# Patient Record
Sex: MALE | Race: BLACK OR AFRICAN AMERICAN | Employment: FULL TIME | URBAN - METROPOLITAN AREA
[De-identification: names, ages, dates, MRNs, and addresses within clinical notes are randomized per-mention and may not be internally consistent; named-entity substitution may affect disease eponyms.]

---

## 2017-08-21 PROBLEM — C83.10 MANTLE CELL LYMPHOMA (HCC): Status: ACTIVE | Noted: 2017-08-21

## 2017-08-21 PROBLEM — C61 PROSTATE CANCER (HCC): Status: ACTIVE | Noted: 2017-08-21

## 2017-08-21 PROBLEM — K63.5 COLON POLYP: Status: ACTIVE | Noted: 2017-08-21

## 2017-08-21 PROBLEM — N40.0 BENIGN NON-NODULAR PROSTATIC HYPERPLASIA WITHOUT LOWER URINARY TRACT SYMPTOMS: Status: ACTIVE | Noted: 2017-08-21

## 2017-08-21 PROBLEM — L03.221 CELLULITIS OF NECK: Status: ACTIVE | Noted: 2017-08-21

## 2017-08-21 PROBLEM — N52.9 ED (ERECTILE DYSFUNCTION) OF ORGANIC ORIGIN: Status: ACTIVE | Noted: 2017-08-21

## 2017-08-21 PROBLEM — E78.5 HYPERLIPIDEMIA: Status: ACTIVE | Noted: 2017-08-21

## 2017-08-21 PROBLEM — G47.419 NARCOLEPSY: Status: ACTIVE | Noted: 2017-08-21

## 2017-08-21 PROBLEM — E87.6 HYPOKALEMIA: Status: ACTIVE | Noted: 2017-08-21

## 2017-08-21 PROBLEM — J31.0 CHRONIC RHINITIS: Status: ACTIVE | Noted: 2017-08-21

## 2017-08-21 RX ORDER — SILDENAFIL CITRATE 20 MG/1
20 TABLET ORAL AS NEEDED
COMMUNITY
End: 2018-04-10

## 2017-09-20 ENCOUNTER — OFFICE VISIT (OUTPATIENT)
Dept: INTERNAL MEDICINE CLINIC | Age: 62
End: 2017-09-20

## 2017-09-20 VITALS
BODY MASS INDEX: 25.93 KG/M2 | WEIGHT: 165.2 LBS | DIASTOLIC BLOOD PRESSURE: 80 MMHG | SYSTOLIC BLOOD PRESSURE: 136 MMHG | HEIGHT: 67 IN | HEART RATE: 90 BPM

## 2017-09-20 DIAGNOSIS — E78.00 PURE HYPERCHOLESTEROLEMIA: Chronic | ICD-10-CM

## 2017-09-20 DIAGNOSIS — I10 ESSENTIAL HYPERTENSION: Primary | Chronic | ICD-10-CM

## 2017-09-20 DIAGNOSIS — N52.9 ED (ERECTILE DYSFUNCTION) OF ORGANIC ORIGIN: ICD-10-CM

## 2017-09-20 PROBLEM — E78.5 HYPERLIPIDEMIA: Chronic | Status: ACTIVE | Noted: 2017-08-21

## 2017-09-20 NOTE — PROGRESS NOTES
Sherry Olsen is a 57 y.o. male presenting for Hypertension (6 mo fu)  . 1. Have you been to the ER, urgent care clinic since your last visit? Hospitalized since your last visit? No    2. Have you seen or consulted any other health care providers outside of the [address] [place] [address] since your last visit? Include any pap smears or colon screening. Yes- Dr [employer]- prostate check. No flowsheet data found. No flowsheet data found. PHQ over the last two weeks 9/20/2017   Little interest or pleasure in doing things Not at all   Feeling down, depressed or hopeless Not at all   Total Score PHQ 2 0       There are no discontinued medications.

## 2017-09-20 NOTE — MR AVS SNAPSHOT
Visit Information Date & Time Provider Department Dept. Phone Encounter #  
 9/20/2017  8:30 AM Michael [facility] [phone] [id number] Follow-up Instructions Return in about 6 months (around 3/20/2018). Upcoming Health Maintenance Date Due Hepatitis C Screening 1955 Pneumococcal 19-64 Highest Risk (1 of 3 - PCV13) 2/19/1972 DTaP/Tdap/Td series (1 - Tdap) 2/19/1974 FOBT Q 1 YEAR AGE 50-75 2/19/2003 ZOSTER VACCINE AGE 60> 12/19/2012 INFLUENZA AGE 9 TO ADULT 8/1/2017 Allergies as of 9/20/2017  Review Complete On: 9/20/2017 By: Michael Gomez MD  
  
 Severity Noted Reaction Type Reactions Lisinopril Low 09/22/2010    Cough Current Immunizations  Never Reviewed Name Date Influenza Vaccine Split 9/5/2012 11:42 AM  
  
 Not reviewed this visit You Were Diagnosed With   
  
 Codes Comments Essential hypertension    -  Primary ICD-10-CM: I10 
ICD-9-CM: 401.9 Pure hypercholesterolemia     ICD-10-CM: E78.00 ICD-9-CM: 272.0 ED (erectile dysfunction) of organic origin     ICD-10-CM: N52.9 ICD-9-CM: 607.84 Vitals BP Pulse Height(growth percentile) Weight(growth percentile) BMI Smoking Status 136/80 (BP 1 Location: Right arm, BP Patient Position: Sitting) 90 5' 7\" (1.702 m) 165 lb 3.2 oz (74.9 kg) 25.87 kg/m2 Never Smoker BMI and BSA Data Body Mass Index Body Surface Area  
 25.87 kg/m 2 1.88 m 2 Preferred Pharmacy Pharmacy Name Phone [facility] [address], [address] [place] [address] Avenue [phone] Your Updated Medication List  
  
   
This list is accurate as of: 9/20/17  8:55 AM.  Always use your most recent med list. amLODIPine 5 mg tablet Commonly known as:  Mary Ellen Winn Take 1 Tab by mouth daily. potassium chloride 20 mEq tablet Commonly known as:  KLOR-CON M20  
 Take 1 Tab by mouth three (3) times daily. propranolol 40 mg tablet Commonly known as:  INDERAL Take 40 mg by mouth two (2) times a day. sildenafil 20 mg tablet Commonly known as:  REVATIO Take 20 mg by mouth three (3) times daily. Follow-up Instructions Return in about 6 months (around 3/20/2018). Patient Instructions DASH Diet: Care Instructions Your Care Instructions The DASH diet is an eating plan that can help lower your blood pressure. DASH stands for Dietary Approaches to Stop Hypertension. Hypertension is high blood pressure. The DASH diet focuses on eating foods that are high in calcium, potassium, and magnesium. These nutrients can lower blood pressure. The foods that are highest in these nutrients are fruits, vegetables, low-fat dairy products, nuts, seeds, and legumes. But taking calcium, potassium, and magnesium supplements instead of eating foods that are high in those nutrients does not have the same effect. The DASH diet also includes whole grains, fish, and poultry. The DASH diet is one of several lifestyle changes your doctor may recommend to lower your high blood pressure. Your doctor may also want you to decrease the amount of sodium in your diet. Lowering sodium while following the DASH diet can lower blood pressure even further than just the DASH diet alone. Follow-up care is a key part of your treatment and safety. Be sure to make and go to all appointments, and call your doctor if you are having problems. It's also a good idea to know your test results and keep a list of the medicines you take. How can you care for yourself at home? Following the DASH diet · Eat 4 to 5 servings of fruit each day. A serving is 1 medium-sized piece of fruit, ½ cup chopped or canned fruit, 1/4 cup dried fruit, or 4 ounces (½ cup) of fruit juice. Choose fruit more often than fruit juice. · Eat 4 to 5 servings of vegetables each day. A serving is 1 cup of lettuce or raw leafy vegetables, ½ cup of chopped or cooked vegetables, or 4 ounces (½ cup) of vegetable juice. Choose vegetables more often than vegetable juice. · Get 2 to 3 servings of low-fat and fat-free dairy each day. A serving is 8 ounces of milk, 1 cup of yogurt, or 1 ½ ounces of cheese. · Eat 6 to 8 servings of grains each day. A serving is 1 slice of bread, 1 ounce of dry cereal, or ½ cup of cooked rice, pasta, or cooked cereal. Try to choose whole-grain products as much as possible. · Limit lean meat, poultry, and fish to 2 servings each day. A serving is 3 ounces, about the size of a deck of cards. · Eat 4 to 5 servings of nuts, seeds, and legumes (cooked dried beans, lentils, and split peas) each week. A serving is 1/3 cup of nuts, 2 tablespoons of seeds, or ½ cup of cooked beans or peas. · Limit fats and oils to 2 to 3 servings each day. A serving is 1 teaspoon of vegetable oil or 2 tablespoons of salad dressing. · Limit sweets and added sugars to 5 servings or less a week. A serving is 1 tablespoon jelly or jam, ½ cup sorbet, or 1 cup of lemonade. · Eat less than 2,300 milligrams (mg) of sodium a day. If you limit your sodium to 1,500 mg a day, you can lower your blood pressure even more. Tips for success · Start small. Do not try to make dramatic changes to your diet all at once. You might feel that you are missing out on your favorite foods and then be more likely to not follow the plan. Make small changes, and stick with them. Once those changes become habit, add a few more changes. · Try some of the following: ¨ Make it a goal to eat a fruit or vegetable at every meal and at snacks. This will make it easy to get the recommended amount of fruits and vegetables each day. ¨ Try yogurt topped with fruit and nuts for a snack or healthy dessert. ¨ Add lettuce, tomato, cucumber, and onion to sandwiches. ¨ Combine a ready-made pizza crust with low-fat mozzarella cheese and lots of vegetable toppings. Try using tomatoes, squash, spinach, broccoli, carrots, cauliflower, and onions. ¨ Have a variety of cut-up vegetables with a low-fat dip as an appetizer instead of chips and dip. ¨ Sprinkle sunflower seeds or chopped almonds over salads. Or try adding chopped walnuts or almonds to cooked vegetables. ¨ Try some vegetarian meals using beans and peas. Add garbanzo or kidney beans to salads. Make burritos and tacos with mashed cruz beans or black beans. Where can you learn more? Go to http://fredrick-ankita.info/. Enter Y755 in the search box to learn more about \"DASH Diet: Care Instructions. \" Current as of: April 3, 2017 Content Version: 11.3 © 9646-9409 [employer]. Care instructions adapted under license by [employer] (which disclaims liability or warranty for this information). If you have questions about a medical condition or this instruction, always ask your healthcare professional. [place] [address] any warranty or liability for your use of this information. Introducing [facility]! [facility] introduces Quietly patient portal. Now you can access parts of your medical record, email your doctor's office, and request medication refills online. 1. In your internet browser, go to [email or web address] 2. Click on the First Time User? Click Here link in the Sign In box. You will see the New Member Sign Up page. 3. Enter your Quietly Access Code exactly as it appears below. You will not need to use this code after youve completed the sign-up process. If you do not sign up before the expiration date, you must request a new code. · Quietly Access Code: [email or web address] Expires: 12/19/2017  8:34 AM 
 
4.  Enter the last four digits of your Social Security Number (xxxx) and Date of Birth (mm/dd/yyyy) as indicated and click Submit. You will be taken to the next sign-up page. 5. Create a NBO TV ID. This will be your NBO TV login ID and cannot be changed, so think of one that is secure and easy to remember. 6. Create a NBO TV password. You can change your password at any time. 7. Enter your Password Reset Question and Answer. This can be used at a later time if you forget your password. 8. Enter your e-mail address. You will receive e-mail notification when new information is available in [address] 9. Click Sign Up. You can now view and download portions of your medical record. 10. Click the Download Summary menu link to download a portable copy of your medical information. If you have questions, please visit the Frequently Asked Questions section of the NBO TV website. Remember, NBO TV is NOT to be used for urgent needs. For medical emergencies, dial 911. Now available from your iPhone and Android! Please provide this summary of care documentation to your next provider. Your primary care clinician is listed as WILTON Head. If you have any questions after today's visit, please call [phone].

## 2017-09-20 NOTE — PROGRESS NOTES
This note will not be viewable in [address] Subjective:     Mr. Mary Peterson presents to the office today in follow-up of his hypertension and hyperlipidemia. The patient has hypertension for which she is on 10 mg of amlodipine and 40 mg twice a day of propranolol. He is tolerating this well. The patient was seen at the [address] [place] [address] on August 14 with a blood pressure recording of 126/75. Patient denies dizziness or lower extremity edema, fatigue or palpitations, he has had no headaches numbness or tingling    He has a dyslipidemia but currently is on no medication for this. He follows a prudent diet and remains physically active. He has no history of ASCVD and denies exertional chest pains or claudication. Patient is followed at the [address] [place] [address] for mantle cell lymphoma, prostate cancer. He did have laboratory studies done at that time which he brings to the office today.     Past Medical History:   Diagnosis Date    Benign non-nodular prostatic hyperplasia without lower urinary tract symptoms 8/21/2017    Cancer [facility])     prostate - surgery/lymphoma    Cellulitis of neck 8/21/2017    Chronic rhinitis 8/21/2017    Colon polyp 8/21/2017    ED (erectile dysfunction) of organic origin 8/21/2017    GERD (gastroesophageal reflux disease)     HNP (herniated nucleus pulposus) 8/21/2017    HTN (hypertension) 9/1/2012    Hyperlipidemia 8/21/2017    Hypertension     Hypokalemia 8/21/2017    Mantle cell lymphoma ([facility] [address] 8/21/2017    Narcolepsy 8/21/2017    Other ill-defined conditions     hand laceration sutured    Prostate cancer ([facility] [address] 8/21/2017     Past Surgical History:   Procedure Laterality Date    HX OTHER SURGICAL      surgery for lymphoma - abdomen    HX PROSTATECTOMY       Allergies   Allergen Reactions    Lisinopril Cough     Current Outpatient Prescriptions   Medication Sig Dispense Refill    sildenafil (REVATIO) 20 mg tablet Take 20 mg by mouth three (3) times daily.      potassium chloride (KLOR-CON M20) 20 mEq tablet Take 1 Tab by mouth three (3) times daily. 90 Tab 0    amLODIPine (NORVASC) 5 mg tablet Take 1 Tab by mouth daily. (Patient taking differently: Take 10 mg by mouth daily.) 30 Tab 0    propranolol (INDERAL) 40 mg tablet Take 40 mg by mouth two (2) times a day. 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    Smoking status: Never Smoker    Smokeless tobacco: Never Used    Alcohol use No    Drug use: No    Sexual activity: Yes     Partners: Female     Other Topics Concern    None     Social History Narrative     Family History   Problem Relation Age of Onset    Cancer Maternal Uncle      prostate    Stroke Maternal Uncle     Stroke Maternal Grandmother        Review of Systems:  GEN: no weight loss, weight gain, fatigue or night sweats  CV: no PND, orthopnea, or palpitations  Resp: no dyspnea on exertion, no cough  Abd: no nausea, vomiting or diarrhea  EXT: denies edema, claudication  Endocrine: no hair loss, excessive thirst or polyuria  Neurological ROS: no TIA or stroke symptoms  ROS otherwise negative      Objective:     Visit Vitals    /80 (BP 1 Location: Right arm, BP Patient Position: Sitting)    Pulse 90    Ht 5' 7\" (1.702 m)    Wt 165 lb 3.2 oz (74.9 kg)    BMI 25.87 kg/m2     Body mass index is 25.87 kg/(m^2). General:   alert, cooperative and no distress   Eyes: conjunctivae/sclerae clear. PERRL, EOM's intact   Mouth:  No oral lesions, no pharyngeal erythema, no exudates   Neck: Trachea midline, no thyromegaly, no bruits   Heart: S1 and S2 normal,no murmurs noted    Lungs: Clear to auscultation bilaterally, no increased work of breathing   Abdomen: Soft, nontender.   Normal bowel sounds   Extremities: No edema or cyanosis   Neuro: ..alert, oriented x3,speech normal in context and clarity, cranial nerves II-XII intact,motor strength: full proximally and distally,gait: normal  reflexes: full and symmetric     Physical exam otherwise negative         Assessment/Plan:     Diagnoses and all orders for this visit:    Essential hypertension    Pure hypercholesterolemia    ED (erectile dysfunction) of organic origin        Other instructions:   Blood pressure is adequately controlled on his current medical regimen and no change in medication is made. The patient had been prescribed sildenafil for his erectile dysfunction but was not tolerant of it. Labs from the [facility] done in August have been reviewed with the patient. Follow-up 6 months    Follow-up Disposition:  Return in about 6 months (around 3/20/2018).     Radha Kinney MD

## 2017-10-09 ENCOUNTER — OFFICE VISIT (OUTPATIENT)
Dept: INTERNAL MEDICINE CLINIC | Age: 62
End: 2017-10-09

## 2017-10-09 VITALS
HEIGHT: 67 IN | BODY MASS INDEX: 25.74 KG/M2 | DIASTOLIC BLOOD PRESSURE: 84 MMHG | HEART RATE: 84 BPM | SYSTOLIC BLOOD PRESSURE: 120 MMHG | WEIGHT: 164 LBS

## 2017-10-09 DIAGNOSIS — I10 ESSENTIAL HYPERTENSION: Primary | Chronic | ICD-10-CM

## 2017-10-09 LAB
ALBUMIN SERPL-MCNC: 4.1 G/DL (ref 3.9–5.4)
ALKALINE PHOS POC: 101 U/L (ref 38–126)
ALT SERPL-CCNC: 31 U/L (ref 9–52)
AST SERPL-CCNC: 25 U/L (ref 14–36)
BACTERIA UA POCT, BACTPOCT: ABNORMAL
BILIRUB UR QL STRIP: NEGATIVE
BUN BLD-MCNC: 14 MG/DL (ref 9–20)
CALCIUM BLD-MCNC: 9.4 MG/DL (ref 8.4–10.2)
CASTS UA POCT: ABNORMAL
CHLORIDE BLD-SCNC: 105 MMOL/L (ref 98–107)
CHOLEST SERPL-MCNC: 184 MG/DL (ref 0–200)
CLUE CELLS, CLUEPOCT: NEGATIVE
CO2 POC: 29 MMOL/L (ref 22–32)
CREAT BLD-MCNC: 1.1 MG/DL (ref 0.8–1.5)
CRYSTALS UA POCT, CRYSPOCT: NEGATIVE
EGFR (POC): 70.6
EPITHELIAL CELLS POCT: ABNORMAL
GLUCOSE POC: 95 MG/DL (ref 75–110)
GLUCOSE UR-MCNC: NEGATIVE MG/DL
GRAN# POC: 3.5 K/UL (ref 2–7.8)
GRAN% POC: 63.3 % (ref 37–92)
HCT VFR BLD CALC: 40.9 % (ref 37–51)
HDLC SERPL-MCNC: 45 MG/DL (ref 35–130)
HGB BLD-MCNC: 13.5 G/DL (ref 12–18)
KETONES P FAST UR STRIP-MCNC: NEGATIVE MG/DL
LDL CHOLESTEROL POC: 124.4 MG/DL (ref 0–130)
LY# POC: 1.7 K/UL (ref 0.6–4.1)
LY% POC: 31.3 % (ref 10–58.5)
MCH RBC QN: 28.4 PG (ref 26–32)
MCHC RBC-ENTMCNC: 33 G/DL (ref 30–36)
MCV RBC: 86 FL (ref 80–97)
MID #, POC: 0.2 K/UL (ref 0–1.8)
MID% POC: 5.4 % (ref 0.1–24)
MUCUS UA POCT, MUCPOCT: ABNORMAL
PH UR STRIP: 7 [PH] (ref 5–7)
PLATELET # BLD: 301 K/UL (ref 140–440)
POTASSIUM SERPL-SCNC: 4.6 MMOL/L (ref 3.6–5)
PROT SERPL-MCNC: 7.7 G/DL (ref 6.3–8.2)
PROT UR QL STRIP: NEGATIVE MG/DL
RBC # BLD: 4.75 M/UL (ref 4.2–6.3)
RBC UA POCT, RBCPOCT: ABNORMAL
SODIUM SERPL-SCNC: 145 MMOL/L (ref 137–145)
SP GR UR STRIP: 1 (ref 1.01–1.02)
TCHOL/HDL RATIO (POC): 4.1 (ref 0–4)
TOTAL BILIRUBIN POC: 0.5 MG/DL (ref 0.2–1.3)
TRICH UA POCT, TRICHPOC: NEGATIVE
TRIGL SERPL-MCNC: 73 MG/DL (ref 0–200)
UA UROBILINOGEN AMB POC: NORMAL (ref 0.2–1)
URINALYSIS CLARITY POC: CLEAR
URINALYSIS COLOR POC: ABNORMAL
URINE BLOOD POC: NEGATIVE
URINE CULT COMMENT, POCT: ABNORMAL
URINE LEUKOCYTES POC: NEGATIVE
URINE NITRITES POC: NEGATIVE
VLDLC SERPL CALC-MCNC: 14.6 MG/DL
WBC # BLD: 5.4 K/UL (ref 4.1–10.9)
WBC UA POCT, WBCPOCT: 0
YEAST UA POCT, YEASTPOC: NEGATIVE

## 2017-10-09 NOTE — PROGRESS NOTES
Anton Osborne is a 57 y.o. male presenting for Hypertension  . 1. Have you been to the ER, urgent care clinic since your last visit? Hospitalized since your last visit? No    2. Have you seen or consulted any other health care providers outside of the [address] [place] [address] since your last visit? Include any pap smears or colon screening. No    No flowsheet data found. Abuse Screening Questionnaire 10/9/2017   Do you ever feel afraid of your partner? N   Are you in a relationship with someone who physically or mentally threatens you? N   Is it safe for you to go home? Y       PHQ over the last two weeks 9/20/2017   Little interest or pleasure in doing things Not at all   Feeling down, depressed or hopeless Not at all   Total Score PHQ 2 0       There are no discontinued medications.

## 2017-10-09 NOTE — PATIENT INSTRUCTIONS

## 2017-10-09 NOTE — PROGRESS NOTES
This note will not be viewable in [address] Subjective:     Mr. Chantell Barber returns to the office today in follow-up of his hypertension. He remains on propranolol and amlodipine. He denies fatigue or palpitations, lower extremity edema or dizziness, headache, numbness, tingling or focal neurological problem. Past Medical History:   Diagnosis Date    Benign non-nodular prostatic hyperplasia without lower urinary tract symptoms 8/21/2017    Cancer [facility])     prostate - surgery/lymphoma    Cellulitis of neck 8/21/2017    Chronic rhinitis 8/21/2017    Colon polyp 8/21/2017    ED (erectile dysfunction) of organic origin 8/21/2017    GERD (gastroesophageal reflux disease)     HNP (herniated nucleus pulposus) 8/21/2017    HTN (hypertension) 9/1/2012    Hyperlipidemia 8/21/2017    Hypertension     Hypokalemia 8/21/2017    Mantle cell lymphoma ([facility] [address] 8/21/2017    Narcolepsy 8/21/2017    Other ill-defined conditions(799.89)     hand laceration sutured    Prostate cancer ([facility] [address] 8/21/2017     Past Surgical History:   Procedure Laterality Date    HX OTHER SURGICAL      surgery for lymphoma - abdomen    HX PROSTATECTOMY       Allergies   Allergen Reactions    Lisinopril Cough     Current Outpatient Prescriptions   Medication Sig Dispense Refill    sildenafil (REVATIO) 20 mg tablet Take 20 mg by mouth as needed.  potassium chloride (KLOR-CON M20) 20 mEq tablet Take 1 Tab by mouth three (3) times daily. 90 Tab 0    amLODIPine (NORVASC) 5 mg tablet Take 1 Tab by mouth daily. (Patient taking differently: Take 10 mg by mouth daily.) 30 Tab 0    propranolol (INDERAL) 40 mg tablet Take 40 mg by mouth two (2) times a day.        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    Smoking status: Never Smoker    Smokeless tobacco: Never Used  Alcohol use No    Drug use: No    Sexual activity: Yes     Partners: Female     Other Topics Concern    None     Social History Narrative     Family History   Problem Relation Age of Onset    Cancer Maternal Uncle      prostate    Stroke Maternal Uncle     Stroke Maternal Grandmother        Review of Systems:  GEN: no weight loss, weight gain, fatigue or night sweats  CV: no PND, orthopnea, or palpitations  Resp: no dyspnea on exertion, no cough  Abd: no nausea, vomiting or diarrhea  EXT: denies edema, claudication  Endocrine: no hair loss, excessive thirst or polyuria  Neurological ROS: no TIA or stroke symptoms  ROS otherwise negative      Objective:     Visit Vitals    /84    Pulse 84    Ht 5' 7\" (1.702 m)    Wt 164 lb (74.4 kg)    BMI 25.69 kg/m2     Body mass index is 25.69 kg/(m^2). General:   alert, cooperative and no distress   Eyes: conjunctivae/sclerae clear. PERRL, EOM's intact   Mouth:  No oral lesions, no pharyngeal erythema, no exudates   Neck: Trachea midline, no thyromegaly, no bruits   Heart: S1 and S2 normal,no murmurs noted    Lungs: Clear to auscultation bilaterally, no increased work of breathing   Abdomen: Soft, nontender. Normal bowel sounds   Extremities: No edema or cyanosis   Neuro: ..alert, oriented x3,speech normal in context and clarity, cranial nerves II-XII intact,motor strength: full proximally and distally,gait: normal  reflexes: full and symmetric     Physical exam otherwise negative         Assessment/Plan:     Diagnoses and all orders for this visit:    Essential hypertension  -     AMB POC COMPLETE CBC,AUTOMATED ENTER  -     AMB POC COMPREHENSIVE METABOLIC PANEL  -     AMB POC LIPID PROFILE  -     AMB POC URINALYSIS DIP STICK AUTO W/ MICRO   -     NM COLLECTION VENOUS BLOOD,VENIPUNCTURE        Other instructions:   His blood pressure is improved and adequately controlled.     No change in his current medical regimen is made    Medical form for him to take back to [place] is completed with copy to be included in the E HR    Await labs obtained today    Follow-up 6 months    Follow-up Disposition:  Return in about 6 months (around 4/9/2018).     Raghav Tripp MD

## 2017-10-09 NOTE — MR AVS SNAPSHOT
Visit Information Date & Time Provider Department Dept. Phone Encounter #  
 10/9/2017  3:30 PM Sneha Thapa [address] [phone] [id number] Follow-up Instructions Return in about 6 months (around 4/9/2018). Follow-up and Disposition History Your Appointments 3/21/2018  8:00 AM  
FOLLOW UP 10 with MD Sneha Thapa [address] ([facility]) Appt Note: 6 mo fu  
 [address] [id number] [address] [facility] [address] [address] Upcoming Health Maintenance Date Due Hepatitis C Screening 1955 Pneumococcal 19-64 Highest Risk (1 of 3 - PCV13) 2/19/1972 DTaP/Tdap/Td series (1 - Tdap) 2/19/1974 FOBT Q 1 YEAR AGE 50-75 2/19/2003 ZOSTER VACCINE AGE 60> 12/19/2012 INFLUENZA AGE 9 TO ADULT 8/1/2017 Allergies as of 10/9/2017  Review Complete On: 10/9/2017 By: Karen Rodriguez MD  
  
 Severity Noted Reaction Type Reactions Lisinopril Low 09/22/2010    Cough Current Immunizations  Never Reviewed Name Date Influenza Vaccine Split 9/5/2012 11:42 AM  
  
 Not reviewed this visit You Were Diagnosed With   
  
 Codes Comments Essential hypertension    -  Primary ICD-10-CM: I10 
ICD-9-CM: 401.9 Vitals BP Pulse Height(growth percentile) Weight(growth percentile) BMI Smoking Status 120/84 84 5' 7\" (1.702 m) 164 lb (74.4 kg) 25.69 kg/m2 Never Smoker Vitals History BMI and BSA Data Body Mass Index Body Surface Area  
 25.69 kg/m 2 1.88 m 2 Preferred Pharmacy Pharmacy Name Phone [facility] [address], [address] [place] [address] Avenue [phone] Your Updated Medication List  
  
   
This list is accurate as of: 10/9/17  3:35 PM.  Always use your most recent med list. amLODIPine 5 mg tablet Commonly known as:  Francisco Lopez Take 1 Tab by mouth daily. potassium chloride 20 mEq tablet Commonly known as:  KLOR-CON M20 Take 1 Tab by mouth three (3) times daily. propranolol 40 mg tablet Commonly known as:  INDERAL Take 40 mg by mouth two (2) times a day. sildenafil 20 mg tablet Commonly known as:  REVATIO Take 20 mg by mouth as needed. We Performed the Following AMB POC COMPLETE CBC,AUTOMATED ENTER M330036 CPT(R)] AMB POC COMPREHENSIVE METABOLIC PANEL [82714 CPT(R)] AMB POC LIPID PROFILE [95777 CPT(R)] AMB POC URINALYSIS DIP STICK AUTO W/ MICRO  [13086 CPT(R)] MA COLLECTION VENOUS BLOOD,VENIPUNCTURE [id number] CPT(R)] Follow-up Instructions Return in about 6 months (around 4/9/2018). Patient Instructions High Blood Pressure: Care Instructions Your Care Instructions If your blood pressure is usually above 140/90, you have high blood pressure, or hypertension. That means the top number is 140 or higher or the bottom number is 90 or higher, or both. Despite what a lot of people think, high blood pressure usually doesn't cause headaches or make you feel dizzy or lightheaded. It usually has no symptoms. But it does increase your risk for heart attack, stroke, and kidney or eye damage. The higher your blood pressure, the more your risk increases. Your doctor will give you a goal for your blood pressure. Your goal will be based on your health and your age. An example of a goal is to keep your blood pressure below 140/90. Lifestyle changes, such as eating healthy and being active, are always important to help lower blood pressure. You might also take medicine to reach your blood pressure goal. 
Follow-up care is a key part of your treatment and safety. Be sure to make and go to all appointments, and call your doctor if you are having problems. It's also a good idea to know your test results and keep a list of the medicines you take. How can you care for yourself at home? Medical treatment · If you stop taking your medicine, your blood pressure will go back up. You may take one or more types of medicine to lower your blood pressure. Be safe with medicines. Take your medicine exactly as prescribed. Call your doctor if you think you are having a problem with your medicine. · Talk to your doctor before you start taking aspirin every day. Aspirin can help certain people lower their risk of a heart attack or stroke. But taking aspirin isn't right for everyone, because it can cause serious bleeding. · See your doctor regularly. You may need to see the doctor more often at first or until your blood pressure comes down. · If you are taking blood pressure medicine, talk to your doctor before you take decongestants or anti-inflammatory medicine, such as ibuprofen. Some of these medicines can raise blood pressure. · Learn how to check your blood pressure at home. Lifestyle changes · Stay at a healthy weight. This is especially important if you put on weight around the waist. Losing even 10 pounds can help you lower your blood pressure. · If your doctor recommends it, get more exercise. Walking is a good choice. Bit by bit, increase the amount you walk every day. Try for at least 30 minutes on most days of the week. You also may want to swim, bike, or do other activities. · Avoid or limit alcohol. Talk to your doctor about whether you can drink any alcohol. · Try to limit how much sodium you eat to less than 2,300 milligrams (mg) a day. Your doctor may ask you to try to eat less than 1,500 mg a day. · Eat plenty of fruits (such as bananas and oranges), vegetables, legumes, whole grains, and low-fat dairy products. · Lower the amount of saturated fat in your diet. Saturated fat is found in animal products such as milk, cheese, and meat. Limiting these foods may help you lose weight and also lower your risk for heart disease. · Do not smoke. Smoking increases your risk for heart attack and stroke. If you need help quitting, talk to your doctor about stop-smoking programs and medicines. These can increase your chances of quitting for good. When should you call for help? Call 911 anytime you think you may need emergency care. This may mean having symptoms that suggest that your blood pressure is causing a serious heart or blood vessel problem. Your blood pressure may be over 180/110. For example, call 911 if: 
· You have symptoms of a heart attack. These may include: ¨ Chest pain or pressure, or a strange feeling in the chest. 
¨ Sweating. ¨ Shortness of breath. ¨ Nausea or vomiting. ¨ Pain, pressure, or a strange feeling in the back, neck, jaw, or upper belly or in one or both shoulders or arms. ¨ Lightheadedness or sudden weakness. ¨ A fast or irregular heartbeat. · You have symptoms of a stroke. These may include: 
¨ Sudden numbness, tingling, weakness, or loss of movement in your face, arm, or leg, especially on only one side of your body. ¨ Sudden vision changes. ¨ Sudden trouble speaking. ¨ Sudden confusion or trouble understanding simple statements. ¨ Sudden problems with walking or balance. ¨ A sudden, severe headache that is different from past headaches. · You have severe back or belly pain. Do not wait until your blood pressure comes down on its own. Get help right away. Call your doctor now or seek immediate care if: 
· Your blood pressure is much higher than normal (such as 180/110 or higher), but you don't have symptoms. · You think high blood pressure is causing symptoms, such as: ¨ Severe headache. ¨ Blurry vision. Watch closely for changes in your health, and be sure to contact your doctor if: 
· Your blood pressure measures 140/90 or higher at least 2 times. That means the top number is 140 or higher or the bottom number is 90 or higher, or both. · You think you may be having side effects from your blood pressure medicine. · Your blood pressure is usually normal, but it goes above normal at least 2 times. Where can you learn more? Go to http://fredrick-ankita.info/. Enter M227 in the search box to learn more about \"High Blood Pressure: Care Instructions. \" Current as of: August 8, 2016 Content Version: 11.3 © 7029-1419 [employer]. Care instructions adapted under license by [employer] (which disclaims liability or warranty for this information). If you have questions about a medical condition or this instruction, always ask your healthcare professional. [address] any warranty or liability for your use of this information. Patient Instructions History Introducing [facility]! Raysa Beebe introduces [employer] patient portal. Now you can access parts of your medical record, email your doctor's office, and request medication refills online. 1. In your internet browser, go to [email or web address] 2. Click on the First Time User? Click Here link in the Sign In box. You will see the New Member Sign Up page. 3. Enter your [employer] Access Code exactly as it appears below. You will not need to use this code after youve completed the sign-up process. If you do not sign up before the expiration date, you must request a new code. · [employer] Access Code: [email or web address] Expires: 12/19/2017  8:34 AM 
 
4. Enter the last four digits of your Social Security Number (xxxx) and Date of Birth (mm/dd/yyyy) as indicated and click Submit. You will be taken to the next sign-up page. 5. Create a [employer] ID. This will be your [employer] login ID and cannot be changed, so think of one that is secure and easy to remember. 6. Create a [employer] password. You can change your password at any time. 7. Enter your Password Reset Question and Answer.  This can be used at a later time if you forget your password. 8. Enter your e-mail address. You will receive e-mail notification when new information is available in [address] 9. Click Sign Up. You can now view and download portions of your medical record. 10. Click the Download Summary menu link to download a portable copy of your medical information. If you have questions, please visit the Frequently Asked Questions section of the [email or web address] website. Remember, [email or web address] is NOT to be used for urgent needs. For medical emergencies, dial 911. Now available from your iPhone and Android! Please provide this summary of care documentation to your next provider. Your primary care clinician is listed as WILTON [address]. If you have any questions after today's visit, please call [phone].

## 2017-10-30 RX ORDER — POTASSIUM CHLORIDE 1500 MG/1
TABLET, EXTENDED RELEASE ORAL
Qty: 270 TAB | Refills: 3 | Status: SHIPPED | OUTPATIENT
Start: 2017-10-30 | End: 2018-09-26 | Stop reason: SDUPTHER

## 2017-10-30 NOTE — TELEPHONE ENCOUNTER
Requested Prescriptions     Pending Prescriptions Disp Refills    KLOR-CON M20 20 mEq tablet [Pharmacy Med Name: KLOR-CON M20 ER 20MEQ TAB] 270 Tab 3     Sig: TAKE ONE TABLET BY MOUTH THREE TIMES DAILY       Last Refill: 7-4-17  Last visit:10/9/2017

## 2018-02-12 RX ORDER — PROPRANOLOL HYDROCHLORIDE 40 MG/1
TABLET ORAL
Qty: 180 TAB | Refills: 3 | Status: SHIPPED | OUTPATIENT
Start: 2018-02-12 | End: 2018-09-26 | Stop reason: SDUPTHER

## 2018-03-21 ENCOUNTER — OFFICE VISIT (OUTPATIENT)
Dept: INTERNAL MEDICINE CLINIC | Age: 63
End: 2018-03-21

## 2018-03-21 VITALS
WEIGHT: 166.4 LBS | OXYGEN SATURATION: 99 % | SYSTOLIC BLOOD PRESSURE: 114 MMHG | HEART RATE: 66 BPM | BODY MASS INDEX: 26.12 KG/M2 | DIASTOLIC BLOOD PRESSURE: 80 MMHG | HEIGHT: 67 IN

## 2018-03-21 DIAGNOSIS — E78.00 PURE HYPERCHOLESTEROLEMIA: Chronic | ICD-10-CM

## 2018-03-21 DIAGNOSIS — I10 ESSENTIAL HYPERTENSION: Primary | Chronic | ICD-10-CM

## 2018-03-21 DIAGNOSIS — C83.10 MANTLE CELL LYMPHOMA, UNSPECIFIED BODY REGION (HCC): ICD-10-CM

## 2018-03-21 DIAGNOSIS — C61 PROSTATE CANCER (HCC): ICD-10-CM

## 2018-03-21 RX ORDER — AMLODIPINE BESYLATE 10 MG/1
10 TABLET ORAL DAILY
COMMUNITY
Start: 2018-03-21 | End: 2018-09-26 | Stop reason: SDUPTHER

## 2018-03-21 NOTE — PROGRESS NOTES
This note will not be viewable in [address] Subjective:     Mr. Marjan Stanton returns to the office today in general medical follow-up. The patient has hypertension which is been well-controlled on his Norvasc 10 mg daily and propranolol 40 mg twice daily. He does take a potassium supplement as well. He denies any dizziness, lower extremity edema, fatigue or palpitations. He has had no headaches, numbness, tingling or focal neurological problems. He has a hyperlipidemia. He currently manages this with diet. He has no history of ASCVD and denies exertional chest pains or claudication. His lipid profile was obtained 6 months ago with a total cholesterol level of 184 and LDL of 124 and an HDL of 45. Patient also has a history of prostate cancer and mantle cell lymphoma. Patient was seen by his prostate specialist 1 month ago and his mantle cell lymphoma specialist 1-2 weeks ago. He evidently is doing well with both of these and is currently not under any kind of treatment.     Past Medical History:   Diagnosis Date    Benign non-nodular prostatic hyperplasia without lower urinary tract symptoms 8/21/2017    Cancer [facility])     prostate - surgery/lymphoma    Cellulitis of neck 8/21/2017    Chronic rhinitis 8/21/2017    Colon polyp 8/21/2017    ED (erectile dysfunction) of organic origin 8/21/2017    GERD (gastroesophageal reflux disease)     HNP (herniated nucleus pulposus) 8/21/2017    HTN (hypertension) 9/1/2012    Hyperlipidemia 8/21/2017    Hypertension     Hypokalemia 8/21/2017    Mantle cell lymphoma ([facility] [address] 8/21/2017    Narcolepsy 8/21/2017    Other ill-defined conditions(799.89)     hand laceration sutured    Prostate cancer ([address] 8/21/2017     Past Surgical History:   Procedure Laterality Date    HX OTHER SURGICAL      surgery for lymphoma - abdomen    HX PROSTATECTOMY       Allergies   Allergen Reactions    Lisinopril Cough     Current Outpatient Prescriptions   Medication Sig Dispense Refill    amLODIPine (NORVASC) 10 mg tablet Take 1 Tab by mouth daily.  propranolol (INDERAL) 40 mg tablet TAKE ONE TABLET BY MOUTH TWICE DAILY 180 Tab 3    KLOR-CON M20 20 mEq tablet TAKE ONE TABLET BY MOUTH THREE TIMES DAILY 270 Tab 3    sildenafil (REVATIO) 20 mg tablet Take 20 mg by mouth as needed. 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    Smoking status: Never Smoker    Smokeless tobacco: Never Used    Alcohol use No    Drug use: No    Sexual activity: Yes     Partners: Female     Other Topics Concern    None     Social History Narrative     Family History   Problem Relation Age of Onset    Cancer Maternal Uncle      prostate    Stroke Maternal Uncle     Stroke Maternal Grandmother        Review of Systems:  GEN: no weight loss, weight gain, fatigue or night sweats  CV: no PND, orthopnea, or palpitations  Resp: no dyspnea on exertion, no cough  Abd: no nausea, vomiting or diarrhea  EXT: denies edema, claudication  Endocrine: no hair loss, excessive thirst or polyuria  Neurological ROS: no TIA or stroke symptoms  ROS otherwise negative      Objective:     Visit Vitals    /80 (BP 1 Location: Left arm, BP Patient Position: Sitting)    Pulse 66    Ht 5' 7\" (1.702 m)    Wt 166 lb 6.4 oz (75.5 kg)    SpO2 99%    BMI 26.06 kg/m2     Body mass index is 26.06 kg/(m^2). General:   alert, cooperative and no distress   Eyes: conjunctivae/sclerae clear. PERRL, EOM's intact   Mouth:  No oral lesions, no pharyngeal erythema, no exudates   Neck: Trachea midline, no thyromegaly, no bruits   Heart: S1 and S2 normal,no murmurs noted    Lungs: Clear to auscultation bilaterally, no increased work of breathing   Abdomen: Soft, nontender.   Normal bowel sounds   Extremities: No edema or cyanosis   Neuro: ..alert, oriented x3,speech normal in context and clarity, cranial nerves II-XII intact,motor strength: full proximally and distally,gait: normal  reflexes: full and symmetric     Physical exam otherwise negative         Assessment/Plan:     Diagnoses and all orders for this visit:    Essential hypertension    Pure hypercholesterolemia    Prostate cancer ([facility] [address]    Mantle cell lymphoma, unspecified body region [facility])        Other instructions: The patient's medications are reviewed and reconciled. No change in his current medical regimen is made. A no added salt, prudent diet is encouraged. Labs from October 9 were reviewed with the patient today. Follow-up in 6 months    Follow-up Disposition:  Return in about 6 months (around 9/21/2018).     Elsy Becker MD

## 2018-03-21 NOTE — PATIENT INSTRUCTIONS

## 2018-03-21 NOTE — PROGRESS NOTES
Patient here for followup on his blood pressure. Prescription for Norvasc needs to be changed to 10mg, so he can take just one per day, if he is to remain on this dose.

## 2018-03-21 NOTE — MR AVS SNAPSHOT
[address] [facility] [address] [address] [phone] Patient: Shelly Pittman MRN: [id number] Visit Information Date & Time Provider Department Dept. Phone Encounter #  
 3/21/2018  8:00 AM Marichuy Lovelace [phone] [id number] Follow-up Instructions Return in about 6 months (around 9/21/2018). Upcoming Health Maintenance Date Due Hepatitis C Screening 1955 DTaP/Tdap/Td series (1 - Tdap) 2/19/1974 FOBT Q 1 YEAR AGE 50-75 2/19/2003 ZOSTER VACCINE AGE 60> 12/19/2012 Influenza Age 5 to Adult 8/1/2017 GLAUCOMA SCREENING Q2Y 2/19/2018 Pneumococcal 65+ High/Highest Risk (1 of 2 - PCV13) 2/19/2018 Allergies as of 3/21/2018  Review Complete On: 3/21/2018 By: Beatriz Garcia MD  
  
 Severity Noted Reaction Type Reactions Lisinopril Low 09/22/2010    Cough Current Immunizations  Never Reviewed Name Date Influenza Vaccine Split 9/5/2012 11:42 AM  
  
 Not reviewed this visit You Were Diagnosed With   
  
 Codes Comments Essential hypertension    -  Primary ICD-10-CM: I10 
ICD-9-CM: 401.9 Pure hypercholesterolemia     ICD-10-CM: E78.00 ICD-9-CM: 272.0 Prostate cancer [facility])     ICD-10-CM: O70 ICD-9-CM: 530 Mantle cell lymphoma, unspecified body region [facility])     ICD-10-CM: C83.10 ICD-9-CM: 200.40 Vitals BP Pulse Height(growth percentile) Weight(growth percentile) SpO2 BMI  
 114/80 (BP 1 Location: Left arm, BP Patient Position: Sitting) 66 5' 7\" (1.702 m) 166 lb 6.4 oz (75.5 kg) 99% 26.06 kg/m2 Smoking Status Never Smoker Vitals History BMI and BSA Data Body Mass Index Body Surface Area 26.06 kg/m 2 1.89 m 2 Preferred Pharmacy Pharmacy Name Phone [facility] [address] [place] [address] [phone] Your Updated Medication List  
  
   
This list is accurate as of 3/21/18  8:31 AM.  Always use your most recent med list. amLODIPine 10 mg tablet Commonly known as:  Tylene Lang Take 1 Tab by mouth daily. KLOR-CON M20 20 mEq tablet Generic drug:  potassium chloride TAKE ONE TABLET BY MOUTH THREE TIMES DAILY propranolol 40 mg tablet Commonly known as:  INDERAL  
TAKE ONE TABLET BY MOUTH TWICE DAILY  
  
 sildenafil (antihypertensive) 20 mg tablet Commonly known as:  REVATIO Take 20 mg by mouth as needed. Follow-up Instructions Return in about 6 months (around 9/21/2018). Patient Instructions DASH Diet: Care Instructions Your Care Instructions The DASH diet is an eating plan that can help lower your blood pressure. DASH stands for Dietary Approaches to Stop Hypertension. Hypertension is high blood pressure. The DASH diet focuses on eating foods that are high in calcium, potassium, and magnesium. These nutrients can lower blood pressure. The foods that are highest in these nutrients are fruits, vegetables, low-fat dairy products, nuts, seeds, and legumes. But taking calcium, potassium, and magnesium supplements instead of eating foods that are high in those nutrients does not have the same effect. The DASH diet also includes whole grains, fish, and poultry. The DASH diet is one of several lifestyle changes your doctor may recommend to lower your high blood pressure. Your doctor may also want you to decrease the amount of sodium in your diet. Lowering sodium while following the DASH diet can lower blood pressure even further than just the DASH diet alone. Follow-up care is a key part of your treatment and safety. Be sure to make and go to all appointments, and call your doctor if you are having problems. It's also a good idea to know your test results and keep a list of the medicines you take. How can you care for yourself at home? Following the DASH diet · Eat 4 to 5 servings of fruit each day. A serving is 1 medium-sized piece of fruit, ½ cup chopped or canned fruit, 1/4 cup dried fruit, or 4 ounces (½ cup) of fruit juice. Choose fruit more often than fruit juice. · Eat 4 to 5 servings of vegetables each day. A serving is 1 cup of lettuce or raw leafy vegetables, ½ cup of chopped or cooked vegetables, or 4 ounces (½ cup) of vegetable juice. Choose vegetables more often than vegetable juice. · Get 2 to 3 servings of low-fat and fat-free dairy each day. A serving is 8 ounces of milk, 1 cup of yogurt, or 1 ½ ounces of cheese. · Eat 6 to 8 servings of grains each day. A serving is 1 slice of bread, 1 ounce of dry cereal, or ½ cup of cooked rice, pasta, or cooked cereal. Try to choose whole-grain products as much as possible. · Limit lean meat, poultry, and fish to 2 servings each day. A serving is 3 ounces, about the size of a deck of cards. · Eat 4 to 5 servings of nuts, seeds, and legumes (cooked dried beans, lentils, and split peas) each week. A serving is 1/3 cup of nuts, 2 tablespoons of seeds, or ½ cup of cooked beans or peas. · Limit fats and oils to 2 to 3 servings each day. A serving is 1 teaspoon of vegetable oil or 2 tablespoons of salad dressing. · Limit sweets and added sugars to 5 servings or less a week. A serving is 1 tablespoon jelly or jam, ½ cup sorbet, or 1 cup of lemonade. · Eat less than 2,300 milligrams (mg) of sodium a day. If you limit your sodium to 1,500 mg a day, you can lower your blood pressure even more. Tips for success · Start small. Do not try to make dramatic changes to your diet all at once. You might feel that you are missing out on your favorite foods and then be more likely to not follow the plan. Make small changes, and stick with them. Once those changes become habit, add a few more changes. · Try some of the following: ¨ Make it a goal to eat a fruit or vegetable at every meal and at snacks. This will make it easy to get the recommended amount of fruits and vegetables each day. ¨ Try yogurt topped with fruit and nuts for a snack or healthy dessert. ¨ Add lettuce, tomato, cucumber, and onion to sandwiches. ¨ Combine a ready-made pizza crust with low-fat mozzarella cheese and lots of vegetable toppings. Try using tomatoes, squash, spinach, broccoli, carrots, cauliflower, and onions. ¨ Have a variety of cut-up vegetables with a low-fat dip as an appetizer instead of chips and dip. ¨ Sprinkle sunflower seeds or chopped almonds over salads. Or try adding chopped walnuts or almonds to cooked vegetables. ¨ Try some vegetarian meals using beans and peas. Add garbanzo or kidney beans to salads. Make burritos and tacos with mashed cruz beans or black beans. Where can you learn more? Go to http://fredrick-ankita.info/. Enter V817 in the search box to learn more about \"DASH Diet: Care Instructions. \" Current as of: September 21, 2016 Content Version: 11.4 © 4556-6510 [employer]. Care instructions adapted under license by [employer] (which disclaims liability or warranty for this information). If you have questions about a medical condition or this instruction, always ask your healthcare professional. [place] [address] any warranty or liability for your use of this information. Introducing [facility] HE[facility]! [facility] introduces Drill Map patient portal. Now you can access parts of your medical record, email your doctor's office, and request medication refills online. 1. In your internet browser, go to [email or web address] 2. Click on the First Time User? Click Here link in the Sign In box. You will see the New Member Sign Up page. 3. Enter your Drill Map Access Code exactly as it appears below. You will not need to use this code after youve completed the sign-up process.  If you do not sign up before the expiration date, you must request a new code. · Indicee Access Code: [email or web address] Expires: 6/19/2018  8:06 AM 
 
4. Enter the last four digits of your Social Security Number (xxxx) and Date of Birth (mm/dd/yyyy) as indicated and click Submit. You will be taken to the next sign-up page. 5. Create a Indicee ID. This will be your Indicee login ID and cannot be changed, so think of one that is secure and easy to remember. 6. Create a Indicee password. You can change your password at any time. 7. Enter your Password Reset Question and Answer. This can be used at a later time if you forget your password. 8. Enter your e-mail address. You will receive e-mail notification when new information is available in [address] 9. Click Sign Up. You can now view and download portions of your medical record. 10. Click the Download Summary menu link to download a portable copy of your medical information. If you have questions, please visit the Frequently Asked Questions section of the Indicee website. Remember, Indicee is NOT to be used for urgent needs. For medical emergencies, dial 911. Now available from your iPhone and Android! Please provide this summary of care documentation to your next provider. Your primary care clinician is listed as WILTON Head. If you have any questions after today's visit, please call [phone].

## 2018-04-10 ENCOUNTER — HOSPITAL ENCOUNTER (EMERGENCY)
Age: 63
Discharge: HOME | End: 2018-04-10
Attending: EMERGENCY MEDICINE
Payer: COMMERCIAL

## 2018-04-10 ENCOUNTER — APPOINTMENT (OUTPATIENT)
Dept: ULTRASOUND IMAGING | Age: 63
End: 2018-04-10
Attending: PHYSICIAN ASSISTANT
Payer: COMMERCIAL

## 2018-04-10 VITALS
DIASTOLIC BLOOD PRESSURE: 105 MMHG | WEIGHT: 167.55 LBS | SYSTOLIC BLOOD PRESSURE: 157 MMHG | HEIGHT: 70 IN | HEART RATE: 79 BPM | OXYGEN SATURATION: 100 % | RESPIRATION RATE: 14 BRPM | BODY MASS INDEX: 23.99 KG/M2 | TEMPERATURE: 97.2 F

## 2018-04-10 DIAGNOSIS — N43.3 HYDROCELE, UNSPECIFIED HYDROCELE TYPE: Primary | ICD-10-CM

## 2018-04-10 DIAGNOSIS — N50.89 SCROTUM SWELLING: ICD-10-CM

## 2018-04-10 LAB

## 2018-04-10 PROCEDURE — 81001 URINALYSIS AUTO W/SCOPE: CPT

## 2018-04-10 PROCEDURE — 76870 US EXAM SCROTUM: CPT

## 2018-04-10 PROCEDURE — 99283 EMERGENCY DEPT VISIT LOW MDM: CPT

## 2018-04-10 RX ORDER — DOXYCYCLINE HYCLATE 100 MG
100 TABLET ORAL 2 TIMES DAILY
Qty: 14 TAB | Refills: 0 | Status: SHIPPED | OUTPATIENT
Start: 2018-04-10 | End: 2018-04-17

## 2018-04-10 NOTE — LETTER
[facility] EMERGENCY DEPT 
[address] [place] [address] [address] [address] [phone] Work/School Note Date: 4/10/2018 To Whom It May concern: 
 
Delmer Rushing was seen and treated today in the emergency room by the following provider(s): 
Attending Provider: Florence Cameron MD 
Physician Assistant: Gibson Torres PA-C. Delmer Rushing may return to work on 12 April 2018. Sincerely, Gibson Torres PA-C

## 2018-04-11 NOTE — ED PROVIDER NOTES
EMERGENCY DEPARTMENT HISTORY AND PHYSICAL EXAM      Date: 4/10/2018  Patient Name: Daniel Wallace    History of Presenting Illness     Chief Complaint   Patient presents with    Groin Pain     \"think I busted something down below right sided testicle pain       History Provided By: Patient    HPI: Daniel Wallace, 70 y.o. male with PMHx significant for HTN, GERD, prostate cancer, ED, hypokalemia, mantle cell lymphoma, hyperlipidemia, and HTN, presents ambulatory to the ED with cc of constant right testicle pain which started today while lifting something at work. Pt describes he \"felt a snap\". He notes he has recently had an MRI which \"showed fluid\" in his scrotum. He states he has had chronic scrotal swelling for the past several months. Pt's associated pain is mild. He reports no dysuria, hematuria, nausea, vomiting, penile discharge, rash, or abdominal pain. Pt has had a prostatectomy. PCP: David Rausch MD    There are no other complaints, changes, or physical findings at this time. Current Outpatient Prescriptions   Medication Sig Dispense Refill    doxycycline (VIBRA-TABS) 100 mg tablet Take 1 Tab by mouth two (2) times a day for 7 days. 14 Tab 0    amLODIPine (NORVASC) 10 mg tablet Take 1 Tab by mouth daily.       propranolol (INDERAL) 40 mg tablet TAKE ONE TABLET BY MOUTH TWICE DAILY 180 Tab 3    KLOR-CON M20 20 mEq tablet TAKE ONE TABLET BY MOUTH THREE TIMES DAILY 270 Tab 3       Past History     Past Medical History:  Past Medical History:   Diagnosis Date    Benign non-nodular prostatic hyperplasia without lower urinary tract symptoms 8/21/2017    Cancer [facility])     prostate - surgery/lymphoma    Cellulitis of neck 8/21/2017    Chronic rhinitis 8/21/2017    Colon polyp 8/21/2017    ED (erectile dysfunction) of organic origin 8/21/2017    GERD (gastroesophageal reflux disease)     HNP (herniated nucleus pulposus) 8/21/2017    HTN (hypertension) 9/1/2012    Hyperlipidemia 8/21/2017    Hypertension     Hypokalemia 8/21/2017    Mantle cell lymphoma ([facility] [address] 8/21/2017    Narcolepsy 8/21/2017    Other ill-defined conditions(799.89)     hand laceration sutured    Prostate cancer ([facility] [address] 8/21/2017       Past Surgical History:  Past Surgical History:   Procedure Laterality Date    HX OTHER SURGICAL      surgery for lymphoma - abdomen    HX PROSTATECTOMY         Family History:  Family History   Problem Relation Age of Onset    Cancer Maternal Uncle      prostate    Stroke Maternal Uncle     Stroke Maternal Grandmother        Social History:  Social History   Substance Use Topics    Smoking status: Never Smoker    Smokeless tobacco: Never Used    Alcohol use No       Allergies: Allergies   Allergen Reactions    Lisinopril Cough         Review of Systems   Review of Systems   Constitutional: Negative for chills, diaphoresis and fever. HENT: Negative for rhinorrhea and sore throat. Eyes: Negative for pain. Respiratory: Negative for shortness of breath, wheezing and stridor. Cardiovascular: Negative for chest pain and leg swelling. Gastrointestinal: Negative for abdominal pain, diarrhea, nausea and vomiting. Genitourinary: Positive for scrotal swelling (chronic) and testicular pain (right). Negative for difficulty urinating, discharge, dysuria, flank pain and hematuria. Musculoskeletal: Negative for back pain and neck stiffness. Skin: Negative for rash. Neurological: Negative for dizziness, seizures, syncope, weakness, light-headedness and headaches. Psychiatric/Behavioral: Negative for behavioral problems and confusion. Physical Exam   Physical Exam   Constitutional: He is oriented to person, place, and time. He appears well-developed and well-nourished. No distress. HENT:   Head: Normocephalic and atraumatic.    Right Ear: External ear normal.   Left Ear: External ear normal.   Nose: Nose normal.   Eyes: Conjunctivae and EOM are normal. Right eye exhibits no discharge. Left eye exhibits no discharge. No scleral icterus. Neck: Normal range of motion. Neck supple. Cardiovascular: Normal rate, regular rhythm and normal heart sounds. No murmur heard. Pulmonary/Chest: Effort normal and breath sounds normal. No stridor. No respiratory distress. He has no decreased breath sounds. He has no wheezes. Abdominal: Soft. Bowel sounds are normal. He exhibits no distension. There is no tenderness. There is no rebound. Genitourinary: Right testis shows swelling. Circumcised. No discharge found. Genitourinary Comments: No external lesions. Right scrotum: Significant swelling without erythema. Small amount of lateral soft tissue swelling. No palpable hernia. Left scrotum: No significant swelling or erythema. Musculoskeletal: Normal range of motion. He exhibits no edema or tenderness. Neurological: He is alert and oriented to person, place, and time. Skin: Skin is warm and dry. No rash noted. He is not diaphoretic. Psychiatric: He has a normal mood and affect. Judgment normal.   Nursing note and vitals reviewed.       Diagnostic Study Results     Labs -     Recent Results (from the past 12 hour(s))   URINALYSIS W/ REFLEX CULTURE    Collection Time: 04/10/18  9:32 PM   Result Value Ref Range    Color YELLOW/STRAW      Appearance CLEAR CLEAR      Specific gravity 1.020 1.003 - 1.030      pH (UA) 6.5 5.0 - 8.0      Protein NEGATIVE  NEG mg/dL    Glucose NEGATIVE  NEG mg/dL    Ketone NEGATIVE  NEG mg/dL    Bilirubin NEGATIVE  NEG      Blood NEGATIVE  NEG      Urobilinogen 1.0 0.2 - 1.0 EU/dL    Nitrites NEGATIVE  NEG      Leukocyte Esterase NEGATIVE  NEG      WBC 0-4 0 - 4 /hpf    RBC 0-5 0 - 5 /hpf    Epithelial cells FEW FEW /lpf    Bacteria NEGATIVE  NEG /hpf    UA:UC IF INDICATED CULTURE NOT INDICATED BY UA RESULT CNI      Hyaline cast 0-2 0 - 5 /lpf       Radiologic Studies -   US SCROTUM/TESTICLES   Final Result   INDICATION: Scrotal pain, no trauma; swelling      Grayscale and color Doppler ultrasound of the testes.     The right testis measures 4.1 cm x 3 cm x 3.2 cm. The left testis measures 4.0  cm x 2.8 cm x 2.5 cm. the testes are homogeneous in echotexture. There is  symmetric vascular flow within the testes. There is no intratesticular mass. There are moderate sized bilateral hydroceles; the right hydrocele is complex. The scrotal wall is markedly edematous, however no drainable focal fluid  collection is visualized.     IMPRESSION  IMPRESSION:   1. No evidence of intratesticular mass, orchitis or testicular torsion. 2. Scrotal wall edema. 3. Bilateral hydroceles, as described above. Medical Decision Making   I am the first provider for this patient. I reviewed the vital signs, available nursing notes, past medical history, past surgical history, family history and social history. Vital Signs-Reviewed the patient's vital signs. Patient Vitals for the past 12 hrs:   Temp Pulse Resp BP SpO2   04/10/18 2036 97.2 °F (36.2 °C) 79 14 (!) 157/105 100 %       Records Reviewed: Nursing Notes and Old Medical Records    Provider Notes (Medical Decision Making):   DDx: epididymitis, orchitis, testicular cancer, inguinal hernia, varicocele, hydrocele, urinary tract infection. Patient presents with testicular pain that started today. Will assess with UA and ultrasound. ED Course:   Initial assessment performed. The patients presenting problems have been discussed, and they are in agreement with the care plan formulated and outlined with them. I have encouraged them to ask questions as they arise throughout their visit. 9:05 PM  Exam chaperoned by Demarcus Sorto RN.     11:05 PM  Discussed patient with Dr. Twin Leo. She recommends starting patient on antibiotics. Disposition:  DISCHARGE NOTE  11:05 PM  The patient has been re-evaluated and is ready for discharge. Reviewed available results with patient.  Counseled patient on diagnosis and care plan. Patient has expressed understanding, and all questions have been answered. Patient agrees with plan and agrees to follow up as recommended, or return to the ED if their symptoms worsen. Discharge instructions have been provided and explained to the patient, along with reasons to return to the ED. PLAN:  1. Discharge home  2. Medications as directed  3. Schedule f/u with General Surgery  4. Return precautions reviewed    Discharge Medication List as of 4/10/2018 11:05 PM      START taking these medications    Details   doxycycline (VIBRA-TABS) 100 mg tablet Take 1 Tab by mouth two (2) times a day for 7 days. , Normal, Disp-14 Tab, R-0         CONTINUE these medications which have NOT CHANGED    Details   amLODIPine (NORVASC) 10 mg tablet Take 1 Tab by mouth daily. , Historical Med      propranolol (INDERAL) 40 mg tablet TAKE ONE TABLET BY MOUTH TWICE DAILY, Normal, Disp-180 Tab, R-3      KLOR-CON M20 20 mEq tablet TAKE ONE TABLET BY MOUTH THREE TIMES DAILY, Normal, Disp-270 Tab, R-3           2. Follow-up Information     Follow up With Details Comments Contact Info    Ed Tejada MD Go to  [address]  [phone]      [facility] EMERGENCY DEPT  As needed, If symptoms worsen [address] [place] [address]  [phone]        Return to ED if worse     Diagnosis     Clinical Impression:   1. Hydrocele, unspecified hydrocele type    2. Scrotum swelling        Attestations:    Attestation Note:  This note is prepared by LIZY [facility], acting as Scribe for MARCUS Beach PA-C: The scribe's documentation has been prepared under my direction and personally reviewed by me in its entirety. I confirm that the note above accurately reflects all work, treatment, procedures, and medical decision making performed by me.           This note will not be viewable in [employer]

## 2018-04-11 NOTE — ED NOTES
Discharge instructions given to patient by NED Correa. Patient verbalized understanding of discharge instructions. Pt discharged without difficulty. Pt discharged in stable condition via ambulation.

## 2018-04-11 NOTE — DISCHARGE INSTRUCTIONS
Thank you!     Thank you for allowing us to provide you with excellent care today. We hope we addressed all of your concerns and needs. We strive to provide excellent quality care in the Emergency Department. You will receive a survey after your visit to evaluate the care you were provided.      Please rate us a level 5 (excellent), as anything less than excellent does not meet our goals.      If you feel that you have not received excellent quality care or timely care, please ask to speak to the nurse manager. Please choose us in the future for your continued health care needs. ______________________________________________________________________    The exam and treatment you received in the Emergency Department were for an urgent problem and are not intended as complete care. It is important that you follow-up with a doctor, nurse practitioner, or physician assistant to:  (1) confirm your diagnosis,  (2) re-evaluation of changes in your illness and treatment, and  (3) for ongoing care. If your symptoms become worse or you do not improve as expected and you are unable to reach your usual health care provider, you should return to the Emergency Department. We are available 24 hours a day. Take this sheet with you when you go to your follow-up visit. If you have any problem arranging the follow-up visit, contact [address] [place] [address] 21 [phone])    Make an appointment with your Primary Care doctor for follow up of this visit. Return to the ER if you are unable to be seen in the time recommended on your discharge instructions.

## 2018-04-11 NOTE — ED TRIAGE NOTES
Fluid in testicle; felt something pull while at work [employer] a snap\" and now pain right side groin

## 2018-09-26 ENCOUNTER — OFFICE VISIT (OUTPATIENT)
Dept: INTERNAL MEDICINE CLINIC | Age: 63
End: 2018-09-26

## 2018-09-26 VITALS
WEIGHT: 167 LBS | BODY MASS INDEX: 23.91 KG/M2 | DIASTOLIC BLOOD PRESSURE: 80 MMHG | HEART RATE: 73 BPM | HEIGHT: 70 IN | SYSTOLIC BLOOD PRESSURE: 124 MMHG | OXYGEN SATURATION: 98 %

## 2018-09-26 DIAGNOSIS — I10 ESSENTIAL HYPERTENSION: Primary | Chronic | ICD-10-CM

## 2018-09-26 DIAGNOSIS — Z11.59 NEED FOR HEPATITIS C SCREENING TEST: ICD-10-CM

## 2018-09-26 DIAGNOSIS — C61 PROSTATE CANCER (HCC): ICD-10-CM

## 2018-09-26 DIAGNOSIS — Z23 ENCOUNTER FOR IMMUNIZATION: ICD-10-CM

## 2018-09-26 DIAGNOSIS — C83.10 MANTLE CELL LYMPHOMA, UNSPECIFIED BODY REGION (HCC): ICD-10-CM

## 2018-09-26 DIAGNOSIS — E78.00 PURE HYPERCHOLESTEROLEMIA: Chronic | ICD-10-CM

## 2018-09-26 RX ORDER — AMLODIPINE BESYLATE 10 MG/1
10 TABLET ORAL DAILY
Qty: 90 TAB | Refills: 3 | Status: SHIPPED | OUTPATIENT
Start: 2018-09-26 | End: 2019-11-12 | Stop reason: SDUPTHER

## 2018-09-26 RX ORDER — PROPRANOLOL HYDROCHLORIDE 40 MG/1
TABLET ORAL
Qty: 180 TAB | Refills: 3 | Status: SHIPPED | OUTPATIENT
Start: 2018-09-26 | End: 2020-02-11

## 2018-09-26 RX ORDER — POTASSIUM CHLORIDE 20 MEQ/1
TABLET, EXTENDED RELEASE ORAL
Qty: 270 TAB | Refills: 3 | Status: SHIPPED | OUTPATIENT
Start: 2018-09-26 | End: 2020-05-17

## 2018-09-26 NOTE — PROGRESS NOTES
This note will not be viewable in [address] Subjective:     Mr. Rosemarie Chun presents to the office today in general medical follow-up. The patient remains on propranolol and amlodipine for his hypertension. He supplements this with the potassium supplement. He denies any fatigue, palpitations, orthostatic dizziness or lower extremity edema. He has had no headaches, numbness, tingling or focal neurological problems. He has a hyperlipidemia currently managed with diet, physical activity and weight control. He has no history of coronary artery disease and denies exertional chest pains or claudication. He has a prior history of mantle cell lymphoma for which he is followed by oncology and prostate cancer which he is followed by urology. He sees both of his specialist on a regular basis and is had no complications to date.     Past Medical History:   Diagnosis Date    Benign non-nodular prostatic hyperplasia without lower urinary tract symptoms 8/21/2017    Cancer [facility])     prostate - surgery/lymphoma    Cellulitis of neck 8/21/2017    Chronic rhinitis 8/21/2017    Colon polyp 8/21/2017    ED (erectile dysfunction) of organic origin 8/21/2017    GERD (gastroesophageal reflux disease)     HNP (herniated nucleus pulposus) 8/21/2017    HTN (hypertension) 9/1/2012    Hyperlipidemia 8/21/2017    Hypertension     Hypokalemia 8/21/2017    Mantle cell lymphoma ([address] 8/21/2017    Narcolepsy 8/21/2017    Other ill-defined conditions(799.89)     hand laceration sutured    Prostate cancer ([facility] [address] 8/21/2017     Past Surgical History:   Procedure Laterality Date    HX OTHER SURGICAL      surgery for lymphoma - abdomen    HX PROSTATECTOMY       Allergies   Allergen Reactions    Lisinopril Cough     Current Outpatient Prescriptions   Medication Sig Dispense Refill    propranolol (INDERAL) 40 mg tablet TAKE ONE TABLET BY MOUTH TWICE DAILY 180 Tab 3    potassium chloride (KLOR-CON M20) 20 mEq tablet TAKE ONE TABLET BY MOUTH THREE TIMES DAILY 270 Tab 3    amLODIPine (NORVASC) 10 mg tablet Take 1 Tab by mouth daily. 80 Tab 3     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    Smoking status: Never Smoker    Smokeless tobacco: Never Used    Alcohol use No    Drug use: No    Sexual activity: Yes     Partners: Female     Other Topics Concern    None     Social History Narrative     Family History   Problem Relation Age of Onset    Cancer Maternal Uncle      prostate    Stroke Maternal Uncle     Stroke Maternal Grandmother        Review of Systems:  GEN: no weight loss, weight gain, fatigue or night sweats  CV: no PND, orthopnea, or palpitations  Resp: no dyspnea on exertion, no cough  Abd: no nausea, vomiting or diarrhea  EXT: denies edema, claudication  Endocrine: no hair loss, excessive thirst or polyuria  Neurological ROS: no TIA or stroke symptoms  ROS otherwise negative      Objective:     Visit Vitals    /80    Pulse 73    Ht 5' 10\" (1.778 m)    Wt 167 lb (75.8 kg)    SpO2 98%    BMI 23.96 kg/m2     Body mass index is 23.96 kg/(m^2). General:   alert, cooperative and no distress   Eyes: conjunctivae/sclerae clear. PERRL, EOM's intact   Mouth:  No oral lesions, no pharyngeal erythema, no exudates   Neck: Trachea midline, no thyromegaly, no bruits   Heart: S1 and S2 normal,no murmurs noted    Lungs: Clear to auscultation bilaterally, no increased work of breathing   Abdomen: Soft, nontender.   Normal bowel sounds   Extremities: No edema or cyanosis   Neuro: ..alert, oriented x3,speech normal in context and clarity, cranial nerves II-XII intact,motor strength: full proximally and distally,gait: normal  reflexes: full and symmetric     Physical exam otherwise negative         Assessment/Plan:     Diagnoses and all orders for this visit:    Essential hypertension  -     AMB POC COMPLETE CBC,AUTOMATED ENTER  -     COLLECTION VENOUS BLOOD,VENIPUNCTURE  -     AMB POC URINALYSIS DIP STICK AUTO W/ MICRO   -     METABOLIC PANEL, COMPREHENSIVE  -     propranolol (INDERAL) 40 mg tablet; TAKE ONE TABLET BY MOUTH TWICE DAILY, Normal, Disp-180 Tab, R-3  -     potassium chloride (KLOR-CON M20) 20 mEq tablet; TAKE ONE TABLET BY MOUTH THREE TIMES DAILY, Normal, Disp-270 Tab, R-3  -     amLODIPine (NORVASC) 10 mg tablet; Take 1 Tab by mouth daily. , Normal, Disp-90 Tab, R-3    Pure hypercholesterolemia  -     LIPID PANEL  -     TSH 3RD GENERATION    Mantle cell lymphoma, unspecified body region [facility])    Prostate cancer ([facility] [address]    Need for hepatitis C screening test  -     HEPATITIS C AB    Encounter for immunization  -     Influenza Vaccine Inactivated (IIV)(FLUAD), Subunit, Adjuvanted, IM, (45804)  -     Pneumococcal Conjugate vaccine - 13 valent (50 years and older)  -     Administration fee () for Medicare insured patients  -     ADMIN PNEUMOCOCCAL VACCINE  Medicare Injection Admin Charge        Other instructions: The patient's medications are reviewed and reconciled. No change in his current medical regimen is made. A no added salt, prudent diet is encouraged    Advanced care planning discussion occurred today    CDC recommended hepatitis C screening will be obtained     Influenza and Prevnar 13 vaccinations were requested today    Await results of multiple labs    Follow-up 6-month    Follow-up Disposition:  Return in about 6 months (around 3/26/2019).     Matt Gray MD

## 2018-09-26 NOTE — PATIENT INSTRUCTIONS
Vaccine Information Statement    Influenza (Flu) Vaccine (Inactivated or Recombinant): What you need to know    Many Vaccine Information Statements are available in Nepali and other languages. See www.immunize.org/vis  Hojas de Información Sobre Vacunas están disponibles en Español y en muchos otros idiomas. Visite www.immunize.org/vis    1. Why get vaccinated? Influenza (flu) is a contagious disease that spreads around the [place] every year, usually between October and May. Flu is caused by influenza viruses, and is spread mainly by coughing, sneezing, and close contact. Anyone can get flu. Flu strikes suddenly and can last several days. Symptoms vary by age, but can include:   fever/chills   sore throat   muscle aches   fatigue   cough   headache    runny or stuffy nose    Flu can also lead to pneumonia and blood infections, and cause diarrhea and seizures in children. If you have a medical condition, such as heart or lung disease, flu can make it worse. Flu is more dangerous for some people. Infants and young children, people 72years of age and older, pregnant women, and people with certain health conditions or a weakened immune system are at greatest risk. Each year thousands of people in the [facility] die from flu, and many more are hospitalized. Flu vaccine can:   keep you from getting flu,   make flu less severe if you do get it, and   keep you from spreading flu to your family and other people. 2. Inactivated and recombinant flu vaccines    A dose of flu vaccine is recommended every flu season. Children 6 months through 6years of age may need two doses during the same flu season. Everyone else needs only one dose each flu season.        Some inactivated flu vaccines contain a very small amount of a mercury-based preservative called thimerosal. Studies have not shown thimerosal in vaccines to be harmful, but flu vaccines that do not contain thimerosal are available. There is no live flu virus in flu shots. They cannot cause the flu. There are many flu viruses, and they are always changing. Each year a new flu vaccine is made to protect against three or four viruses that are likely to cause disease in the upcoming flu season. But even when the vaccine doesnt exactly match these viruses, it may still provide some protection    Flu vaccine cannot prevent:   flu that is caused by a virus not covered by the vaccine, or   illnesses that look like flu but are not. It takes about 2 weeks for protection to develop after vaccination, and protection lasts through the flu season. 3. Some people should not get this vaccine    Tell the person who is giving you the vaccine:     If you have any severe, life-threatening allergies. If you ever had a life-threatening allergic reaction after a dose of flu vaccine, or have a severe allergy to any part of this vaccine, you may be advised not to get vaccinated. Most, but not all, types of flu vaccine contain a small amount of egg protein.  If you ever had Guillain-Barré Syndrome (also called GBS). Some people with a history of GBS should not get this vaccine. This should be discussed with your doctor.  If you are not feeling well. It is usually okay to get flu vaccine when you have a mild illness, but you might be asked to come back when you feel better. 4. Risks of a vaccine reaction    With any medicine, including vaccines, there is a chance of reactions. These are usually mild and go away on their own, but serious reactions are also possible. Most people who get a flu shot do not have any problems with it.      Minor problems following a flu shot include:    soreness, redness, or swelling where the shot was given     hoarseness   sore, red or itchy eyes   cough   fever   aches   headache   itching   fatigue  If these problems occur, they usually begin soon after the shot and last 1 or 2 days. More serious problems following a flu shot can include the following:     There may be a small increased risk of Guillain-Barré Syndrome (GBS) after inactivated flu vaccine. This risk has been estimated at 1 or 2 additional cases per million people vaccinated. This is much lower than the risk of severe complications from flu, which can be prevented by flu vaccine.  Young children who get the flu shot along with pneumococcal vaccine (PCV13) and/or DTaP vaccine at the same time might be slightly more likely to have a seizure caused by fever. Ask your doctor for more information. Tell your doctor if a child who is getting flu vaccine has ever had a seizure. Problems that could happen after any injected vaccine:      People sometimes faint after a medical procedure, including vaccination. Sitting or lying down for about 15 minutes can help prevent fainting, and injuries caused by a fall. Tell your doctor if you feel dizzy, or have vision changes or ringing in the ears.  Some people get severe pain in the shoulder and have difficulty moving the arm where a shot was given. This happens very rarely.  Any medication can cause a severe allergic reaction. Such reactions from a vaccine are very rare, estimated at about 1 in a million doses, and would happen within a few minutes to a few hours after the vaccination. As with any medicine, there is a very remote chance of a vaccine causing a serious injury or death. The safety of vaccines is always being monitored. For more information, visit: www.cdc.gov/vaccinesafety/    5. What if there is a serious reaction? What should I look for?  Look for anything that concerns you, such as signs of a severe allergic reaction, very high fever, or unusual behavior.     Signs of a severe allergic reaction can include hives, swelling of the face and throat, difficulty breathing, a fast heartbeat, dizziness, and weakness - usually within a few minutes to a few hours after the vaccination. What should I do?  If you think it is a severe allergic reaction or other emergency that cant wait, call 9-1-1 and get the person to the nearest hospital. Otherwise, call your doctor.  Reactions should be reported to the Vaccine Adverse Event Reporting System (VAERS). Your doctor should file this report, or you can do it yourself through  the VAERS web site at www.vaers. [facility].gov, or by calling 6-676.129.7262. VAERS does not give medical advice. 6. The National Vaccine Injury Compensation Program    The [facility] Vaccine Injury Compensation Program (VICP) is a federal program that was created to compensate people who may have been injured by certain vaccines. Persons who believe they may have been injured by a vaccine can learn about the program and about filing a claim by calling 5-179.328.9034 or visiting the [employer] website at www.[facility].gov/vaccinecompensation. There is a time limit to file a claim for compensation. 7. How can I learn more?  Ask your healthcare provider. He or she can give you the vaccine package insert or suggest other sources of information.  Call your local or state health department.  Contact the Centers for Disease Control and Prevention (CDC):  - Call 8-643.541.3088 (1-800-CDC-INFO) or  - Visit CDCs website at www.cdc.gov/flu    Vaccine Information Statement   Inactivated Influenza Vaccine   8/7/2015  [address] [id number]    Department of Health and Human Services  Centers for Disease Control and Prevention    Office Use Only    Vaccine Information Statement     Pneumococcal Conjugate Vaccine (PCV13): What You Need to Know    Many Vaccine Information Statements are available in German and other languages. See www.immunize.org/vis. Hojas de información Sobre Vacunas están disponibles en español y en muchos otros idiomas. Visite www.immunize.org/vis. 1. Why get vaccinated?     Vaccination can protect both children and adults from pneumococcal disease. Pneumococcal disease is caused by bacteria that can spread from person to person through close contact. It can cause ear infections, and it can also lead to more serious infections of the:   Lungs (pneumonia),   Blood (bacteremia), and   Covering of the brain and spinal cord (meningitis). Pneumococcal pneumonia is most common among adults. Pneumococcal meningitis can cause deafness and brain damage, and it kills about 1 child in 10 who get it. Anyone can get pneumococcal disease, but children under 3years of age and adults 72 years and older, people with certain medical conditions, and cigarette smokers are at the highest risk. Before there was a vaccine, the [facility] saw:   more than 700 cases of meningitis,   about 13,000 blood infections,   about 5 million ear infections, and   about 200 deaths  in children under 5 each year from pneumococcal disease. Since vaccine became available, severe pneumococcal disease in these children has fallen by 88%. About 18,000 older adults die of pneumococcal disease each year in the [place]. Treatment of pneumococcal infections with penicillin and other drugs is not as effective as it used to be, because some strains of the disease have become resistant to these drugs. This makes prevention of the disease, through vaccination, even more important. 2. PCV13 vaccine    Pneumococcal conjugate vaccine (called PCV13) protects against 13 types of pneumococcal bacteria. PCV13 is routinely given to children at 2, 4, 6, and 1515 months of age. It is also recommended for children and adults 3to 59years of age with certain health conditions, and for all adults 72years of age and older. Your doctor can give you details.     3. Some people should not get this vaccine    Anyone who has ever had a life-threatening allergic reaction to a dose of this vaccine, to an earlier pneumococcal vaccine called PCV7, or to any vaccine containing diphtheria toxoid (for example, DTaP), should not get PCV13. Anyone with a severe allergy to any component of PCV13 should not get the vaccine. Tell your doctor if the person being vaccinated has any severe allergies. If the person scheduled for vaccination is not feeling well, your healthcare provider might decide to reschedule the shot on another day. 4. Risks of a vaccine reaction    With any medicine, including vaccines, there is a chance of reactions. These are usually mild and go away on their own, but serious reactions are also possible. Problems reported following PCV13 varied by age and dose in the series. The most common problems reported among children were:    About half became drowsy after the shot, had a temporary loss of appetite, or had redness or tenderness where the shot was given.  About 1 out of 3 had swelling where the shot was given.  About 1 out of 3 had a mild fever, and about 1 in 20 had a fever over 102.2°F.   Up to about 8 out of 10 became fussy or irritable. Adults have reported pain, redness, and swelling where the shot was given; also mild fever, fatigue, headache, chills, or muscle pain. [facility] children who get PCV13 along with inactivated flu vaccine at the same time may be at increased risk for seizures caused by fever. Ask your doctor for more information. Problems that could happen after any vaccine:     People sometimes faint after a medical procedure, including vaccination. Sitting or lying down for about 15 minutes can help prevent fainting, and injuries caused by a fall. Tell your doctor if you feel dizzy, or have vision changes or ringing in the ears.  Some older children and adults get severe pain in the shoulder and have difficulty moving the arm where a shot was given. This happens very rarely.  Any medication can cause a severe allergic reaction.  Such reactions from a vaccine are very rare, estimated at about 1 in a million doses, and would happen within a few minutes to a few hours after the vaccination. As with any medicine, there is a very small chance of a vaccine causing a serious injury or death. The safety of vaccines is always being monitored. For more information, visit: www.cdc.gov/vaccinesafety/     5. What if there is a serious reaction? What should I look for?  Look for anything that concerns you, such as signs of a severe allergic reaction, very high fever, or unusual behavior. Signs of a severe allergic reaction can include hives, swelling of the face and throat, difficulty breathing, a fast heartbeat, dizziness, and weakness - usually within a few minutes to a few hours after the vaccination. What should I do?  If you think it is a severe allergic reaction or other emergency that cant wait, call 9-1-1 or get the person to the nearest hospital. Otherwise, call your doctor. Reactions should be reported to the Vaccine Adverse Event Reporting System (VAERS). Your doctor should file this report, or you can do it yourself through the VAERS web site at www.vaers. [facility].gov, or by calling 4-278.424.3274. VAERS does not give medical advice. 6. The National Vaccine Injury Compensation Program    The [facility] Vaccine Injury Compensation Program (VICP) is a federal program that was created to compensate people who may have been injured by certain vaccines. Persons who believe they may have been injured by a vaccine can learn about the program and about filing a claim by calling 9-597.265.4197 or visiting the [address] website at www.[facility].gov/vaccinecompensation. There is a time limit to file a claim for compensation. 7. How can I learn more?  Ask your healthcare provider. He or she can give you the vaccine package insert or suggest other sources of information.  Call your local or state health department.    Contact the Centers for Disease Control and Prevention ([facility]):  - Call 0-358.231.8142 (1-800-CDC-INFO) or  - Visit CDCs website at www.cdc.gov/vaccines    Vaccine Information Statement   PCV13 Vaccine   11/5/2015   [id number]    Department of Health and Human Services  Centers for Disease Control and Prevention    Office Use Only       Advance Directives: Care Instructions  Your Care Instructions  An advance directive is a legal way to state your wishes at the end of your life. It tells your family and your doctor what to do if you can no longer say what you want. There are two main types of advance directives. You can change them any time that your wishes change. · A living will tells your family and your doctor your wishes about life support and other treatment. · A durable power of  for health care lets you name a person to make treatment decisions for you when you can't speak for yourself. This person is called a health care agent. If you do not have an advance directive, decisions about your medical care may be made by a doctor or a  who doesn't know you. It may help to think of an advance directive as a gift to the people who care for you. If you have one, they won't have to make tough decisions by themselves. Follow-up care is a key part of your treatment and safety. Be sure to make and go to all appointments, and call your doctor if you are having problems. It's also a good idea to know your test results and keep a list of the medicines you take. How can you care for yourself at home? · Discuss your wishes with your loved ones and your doctor. This way, there are no surprises. · Many states have a unique form. Or you might use a universal form that has been approved by many states. This kind of form can sometimes be completed and stored online. Your electronic copy will then be available wherever you have a connection to the Internet.  In most cases, doctors will respect your wishes even if you have a form from a different state.  · You don't need a  to do an advance directive. But you may want to get legal advice. · Think about these questions when you prepare an advance directive:  ¨ Who do you want to make decisions about your medical care if you are not able to? Many people choose a family member or close friend. ¨ Do you know enough about life support methods that might be used? If not, talk to your doctor so you understand. ¨ What are you most afraid of that might happen? You might be afraid of having pain, losing your independence, or being kept alive by machines. ¨ Where would you prefer to die? Choices include your home, a hospital, or a nursing home. ¨ Would you like to have information about hospice care to support you and your family? ¨ Do you want to donate organs when you die? ¨ Do you want certain [employer] practices performed before you die? If so, put your wishes in the advance directive. · Read your advance directive every year, and make changes as needed. When should you call for help? Be sure to contact your doctor if you have any questions. Where can you learn more? Go to http://fredrick-ankita.info/. Enter R264 in the search box to learn more about \"Advance Directives: Care Instructions. \"  Current as of: October 6, 2017  Content Version: 11.7  © 5440-7098 [employer]. Care instructions adapted under license by [employer] (which disclaims liability or warranty for this information). If you have questions about a medical condition or this instruction, always ask your healthcare professional. [place] [address] any warranty or liability for your use of this information.

## 2018-09-26 NOTE — PROGRESS NOTES
Karen Molina is a 70 y.o. male presenting for Hypertension (6 mo fu)  . 1. Have you been to the ER, urgent care clinic since your last visit? Hospitalized since your last visit? No    2. Have you seen or consulted any other health care providers outside of the [address] [place] [address] since your last visit? Include any pap smears or colon screening. No    Fall Risk Assessment, last 12 mths 9/26/2018   Able to walk? Yes   Fall in past 12 months? No         Abuse Screening Questionnaire 9/26/2018   Do you ever feel afraid of your partner? N   Are you in a relationship with someone who physically or mentally threatens you? N   Is it safe for you to go home? Y       PHQ over the last two weeks 9/26/2018   Little interest or pleasure in doing things Not at all   Feeling down, depressed, irritable, or hopeless Not at all   Total Score PHQ 2 0       There are no discontinued medications.

## 2018-09-26 NOTE — MR AVS SNAPSHOT
[facility] 
 
 
 [place] [address] [address] [phone] Patient: Melodie Ratliff MRN: [id number] VAK:[id number]/61/[id number] Visit Information Date & Time Provider Department Dept. Phone Encounter #  
 9/26/2018  8:00 AM Sneha Garzon [phone] [id number] Follow-up Instructions Return in about 6 months (around 3/26/2019). Your Appointments 3/28/2019  8:00 AM  
FOLLOW UP 10 with MD Sneha Garzon 84 ([address]) Appt Note: 6 month follow up appointment [place] [address] [id number] [address] [address] Upcoming Health Maintenance Date Due Hepatitis C Screening 1955 DTaP/Tdap/Td series (1 - Tdap) 2/19/1974 Shingrix Vaccine Age 50> (1 of 2) 2/19/2003 FOBT Q 1 YEAR AGE 50-75 2/19/2003 GLAUCOMA SCREENING Q2Y 2/19/2018 Pneumococcal 65+ High/Highest Risk (1 of 2 - PCV13) 2/19/2018 MEDICARE YEARLY EXAM 4/10/2018 Influenza Age 5 to Adult 8/1/2018 Allergies as of 9/26/2018  Review Complete On: 9/26/2018 By: Matt Gray MD  
  
 Severity Noted Reaction Type Reactions Lisinopril Low 09/22/2010    Cough Current Immunizations  Never Reviewed Name Date Influenza Vaccine (Tri) Adjuvanted  Incomplete Influenza Vaccine Split 9/5/2012 11:42 AM  
 Pneumococcal Conjugate (PCV-13)  Incomplete Not reviewed this visit You Were Diagnosed With   
  
 Codes Comments Essential hypertension    -  Primary ICD-10-CM: I10 
ICD-9-CM: 401.9 Pure hypercholesterolemia     ICD-10-CM: E78.00 ICD-9-CM: 272.0 Mantle cell lymphoma, unspecified body region [facility])     ICD-10-CM: C83.10 ICD-9-CM: 200.40 Prostate cancer [facility])     ICD-10-CM: Y31 ICD-9-CM: 427 Need for hepatitis C screening test     ICD-10-CM: Z11.59 
ICD-9-CM: V73.89 Encounter for immunization     ICD-10-CM: D13 ICD-9-CM: V03.89 Vitals BP Pulse Height(growth percentile) Weight(growth percentile) SpO2 BMI  
 124/80 73 5' 10\" (1.778 m) 167 lb (75.8 kg) 98% 23.96 kg/m2 Smoking Status Never Smoker Vitals History BMI and BSA Data Body Mass Index Body Surface Area  
 23.96 kg/m 2 1.93 m 2 Preferred Pharmacy Pharmacy Name Phone [facility] [address] [place] [address] [address], [address] [phone] Your Updated Medication List  
  
   
This list is accurate as of 9/26/18  8:30 AM.  Always use your most recent med list. amLODIPine 10 mg tablet Commonly known as:  Lennis Eagles Take 1 Tab by mouth daily. potassium chloride 20 mEq tablet Commonly known as:  KLOR-CON M20  
TAKE ONE TABLET BY MOUTH THREE TIMES DAILY propranolol 40 mg tablet Commonly known as:  INDERAL  
TAKE ONE TABLET BY MOUTH TWICE DAILY Prescriptions Sent to Pharmacy Refills  
 propranolol (INDERAL) 40 mg tablet 3 Sig: TAKE ONE TABLET BY MOUTH TWICE DAILY Class: Normal  
 Pharmacy: [facility] DR LETI SHAH [address], [address] [facility] Ph #: [phone]  
 potassium chloride (KLOR-CON M20) 20 mEq tablet 3 Sig: TAKE ONE TABLET BY MOUTH THREE TIMES DAILY Class: Normal  
 Pharmacy: [facility] DR LETI SHAH [address], [address] [facility] Ph #: [phone]  
 amLODIPine (NORVASC) 10 mg tablet 3 Sig: Take 1 Tab by mouth daily. Class: Normal  
 Pharmacy: [facility] DR LETI SHAH [address], [address] [place] Ph #: [phone] Route: Oral  
  
We Performed the Following ADMIN INFLUENZA VIRUS VAC [ [facility]] ADMIN PNEUMOCOCCAL VACCINE [ [facility]] AMB POC COMPLETE CBC,AUTOMATED ENTER [id number] CPT(R)] AMB POC URINALYSIS DIP STICK AUTO W/ MICRO  [54859 CPT(R)] COLLECTION VENOUS BLOOD,VENIPUNCTURE [id number] CPT(R)] HEPATITIS C AB [56214 CPT(R)] INFLUENZA VACCINE INACTIVATED (IIV), SUBUNIT, ADJUVANTED, IM [id number] CPT(R)] LIPID PANEL [62874 CPT(R)] METABOLIC PANEL, COMPREHENSIVE [87354 CPT(R)] PNEUMOCOCCAL CONJ VACCINE 13 VALENT IM [id number] CPT(R)] TSH 3RD GENERATION [99858 CPT(R)] Follow-up Instructions Return in about 6 months (around 3/26/2019). Patient Instructions Vaccine Information Statement Influenza (Flu) Vaccine (Inactivated or Recombinant): What you need to know Many Vaccine Information Statements are available in Romansh and other languages. See www.immunize.org/vis Hojas de Información Sobre Vacunas están disponibles en Español y en muchos otros idiomas. Visite www.immunize.org/vis 1. Why get vaccinated? Influenza (flu) is a contagious disease that spreads around the [facility] every year, usually between October and May. Flu is caused by influenza viruses, and is spread mainly by coughing, sneezing, and close contact. Anyone can get flu. Flu strikes suddenly and can last several days. Symptoms vary by age, but can include: 
 fever/chills  sore throat  muscle aches  fatigue  cough  headache  runny or stuffy nose Flu can also lead to pneumonia and blood infections, and cause diarrhea and seizures in children. If you have a medical condition, such as heart or lung disease, flu can make it worse. Flu is more dangerous for some people. Infants and young children, people 72years of age and older, pregnant women, and people with certain health conditions or a weakened immune system are at greatest risk. Each year thousands of people in the [facility] die from flu, and many more are hospitalized.   
 
Flu vaccine can: 
 keep you from getting flu, 
 make flu less severe if you do get it, and 
  keep you from spreading flu to your family and other people. 2. Inactivated and recombinant flu vaccines A dose of flu vaccine is recommended every flu season. Children 6 months through 6years of age may need two doses during the same flu season. Everyone else needs only one dose each flu season. Some inactivated flu vaccines contain a very small amount of a mercury-based preservative called thimerosal. Studies have not shown thimerosal in vaccines to be harmful, but flu vaccines that do not contain thimerosal are available. There is no live flu virus in flu shots. They cannot cause the flu. There are many flu viruses, and they are always changing. Each year a new flu vaccine is made to protect against three or four viruses that are likely to cause disease in the upcoming flu season. But even when the vaccine doesnt exactly match these viruses, it may still provide some protection Flu vaccine cannot prevent: 
 flu that is caused by a virus not covered by the vaccine, or 
 illnesses that look like flu but are not. It takes about 2 weeks for protection to develop after vaccination, and protection lasts through the flu season. 3. Some people should not get this vaccine Tell the person who is giving you the vaccine:  If you have any severe, life-threatening allergies. If you ever had a life-threatening allergic reaction after a dose of flu vaccine, or have a severe allergy to any part of this vaccine, you may be advised not to get vaccinated. Most, but not all, types of flu vaccine contain a small amount of egg protein.  If you ever had Guillain-Barré Syndrome (also called GBS). Some people with a history of GBS should not get this vaccine. This should be discussed with your doctor.  If you are not feeling well. It is usually okay to get flu vaccine when you have a mild illness, but you might be asked to come back when you feel better. 4. Risks of a vaccine reaction With any medicine, including vaccines, there is a chance of reactions. These are usually mild and go away on their own, but serious reactions are also possible. Most people who get a flu shot do not have any problems with it. Minor problems following a flu shot include:  
 soreness, redness, or swelling where the shot was given  hoarseness  sore, red or itchy eyes  cough  fever  aches  headache  itching  fatigue If these problems occur, they usually begin soon after the shot and last 1 or 2 days. More serious problems following a flu shot can include the following:  There may be a small increased risk of Guillain-Barré Syndrome (GBS) after inactivated flu vaccine. This risk has been estimated at 1 or 2 additional cases per million people vaccinated. This is much lower than the risk of severe complications from flu, which can be prevented by flu vaccine.  Young children who get the flu shot along with pneumococcal vaccine (PCV13) and/or DTaP vaccine at the same time might be slightly more likely to have a seizure caused by fever. Ask your doctor for more information. Tell your doctor if a child who is getting flu vaccine has ever had a seizure. Problems that could happen after any injected vaccine:  People sometimes faint after a medical procedure, including vaccination. Sitting or lying down for about 15 minutes can help prevent fainting, and injuries caused by a fall. Tell your doctor if you feel dizzy, or have vision changes or ringing in the ears.  Some people get severe pain in the shoulder and have difficulty moving the arm where a shot was given. This happens very rarely.  Any medication can cause a severe allergic reaction. Such reactions from a vaccine are very rare, estimated at about 1 in a million doses, and would happen within a few minutes to a few hours after the vaccination. As with any medicine, there is a very remote chance of a vaccine causing a serious injury or death. The safety of vaccines is always being monitored. For more information, visit: www.cdc.gov/vaccinesafety/ 
 
5. What if there is a serious reaction? What should I look for?  Look for anything that concerns you, such as signs of a severe allergic reaction, very high fever, or unusual behavior. Signs of a severe allergic reaction can include hives, swelling of the face and throat, difficulty breathing, a fast heartbeat, dizziness, and weakness  usually within a few minutes to a few hours after the vaccination. What should I do?  If you think it is a severe allergic reaction or other emergency that cant wait, call 9-1-1 and get the person to the nearest hospital. Otherwise, call your doctor.  Reactions should be reported to the Vaccine Adverse Event Reporting System (VAERS). Your doctor should file this report, or you can do it yourself through  the VAERS web site at www.vaers. [facility].gov, or by calling 3-994.547.6487. VAERS does not give medical advice. 6. The National Vaccine Injury Compensation Program 
 
The [facility] Vaccine Injury Compensation Program (VICP) is a federal program that was created to compensate people who may have been injured by certain vaccines. Persons who believe they may have been injured by a vaccine can learn about the program and about filing a claim by calling 8-675.331.4030 or visiting the [employer] website at www.[facility].gov/vaccinecompensation. There is a time limit to file a claim for compensation. 7. How can I learn more?  Ask your healthcare provider. He or she can give you the vaccine package insert or suggest other sources of information.  Call your local or state health department.  Contact the Centers for Disease Control and Prevention (CDC): 
- Call 8-573.482.9701 (1-800-CDC-INFO) or 
- Visit CDCs website at www.cdc.gov/flu Vaccine Information Statement Inactivated Influenza Vaccine 8/7/2015 
42 KP Araujo Ip [id number] [facility] and [facility] Centers for Disease Control and Prevention Office Use Only Vaccine Information Statement Pneumococcal Conjugate Vaccine (PCV13): What You Need to Know Many Vaccine Information Statements are available in Paraguayan and other languages. See www.immunize.org/vis. Hojas de información Sobre Vacunas están disponibles en español y en muchos otros idiomas. Visite www.immunize.org/vis. 1. Why get vaccinated? Vaccination can protect both children and adults from pneumococcal disease. Pneumococcal disease is caused by bacteria that can spread from person to person through close contact. It can cause ear infections, and it can also lead to more serious infections of the: 
 Lungs (pneumonia),  Blood (bacteremia), and 
 Covering of the brain and spinal cord (meningitis). Pneumococcal pneumonia is most common among adults. Pneumococcal meningitis can cause deafness and brain damage, and it kills about 1 child in 10 who get it. Anyone can get pneumococcal disease, but children under 3years of age and adults 72 years and older, people with certain medical conditions, and cigarette smokers are at the highest risk. Before there was a vaccine, the [facility] saw: 
 more than 700 cases of meningitis, 
 about 13,000 blood infections, 
 about 5 million ear infections, and 
 about 200 deaths 
in children under 5 each year from pneumococcal disease. Since vaccine became available, severe pneumococcal disease in these children has fallen by 88%. About 18,000 older adults die of pneumococcal disease each year in the [place]. Treatment of pneumococcal infections with penicillin and other drugs is not as effective as it used to be, because some strains of the disease have become resistant to these drugs.  This makes prevention of the disease, through vaccination, even more important. 2. PCV13 vaccine Pneumococcal conjugate vaccine (called PCV13) protects against 13 types of pneumococcal bacteria. PCV13 is routinely given to children at 2, 4, 6, and 1515 months of age. It is also recommended for children and adults 3to 59years of age with certain health conditions, and for all adults 72years of age and older. Your doctor can give you details. 3. Some people should not get this vaccine Anyone who has ever had a life-threatening allergic reaction to a dose of this vaccine, to an earlier pneumococcal vaccine called PCV7, or to any vaccine containing diphtheria toxoid (for example, DTaP), should not get PCV13. Anyone with a severe allergy to any component of PCV13 should not get the vaccine. Tell your doctor if the person being vaccinated has any severe allergies. If the person scheduled for vaccination is not feeling well, your healthcare provider might decide to reschedule the shot on another day. 4. Risks of a vaccine reaction With any medicine, including vaccines, there is a chance of reactions. These are usually mild and go away on their own, but serious reactions are also possible. Problems reported following PCV13 varied by age and dose in the series. The most common problems reported among children were:  About half became drowsy after the shot, had a temporary loss of appetite, or had redness or tenderness where the shot was given.  About 1 out of 3 had swelling where the shot was given.  About 1 out of 3 had a mild fever, and about 1 in 20 had a fever over 102.2°F. 
 Up to about 8 out of 10 became fussy or irritable. Adults have reported pain, redness, and swelling where the shot was given; also mild fever, fatigue, headache, chills, or muscle pain.  
 
Young children who get PCV13 along with inactivated flu vaccine at the same time may be at increased risk for seizures caused by fever. Ask your doctor for more information. Problems that could happen after any vaccine:  People sometimes faint after a medical procedure, including vaccination. Sitting or lying down for about 15 minutes can help prevent fainting, and injuries caused by a fall. Tell your doctor if you feel dizzy, or have vision changes or ringing in the ears.  Some older children and adults get severe pain in the shoulder and have difficulty moving the arm where a shot was given. This happens very rarely.  Any medication can cause a severe allergic reaction. Such reactions from a vaccine are very rare, estimated at about 1 in a million doses, and would happen within a few minutes to a few hours after the vaccination. As with any medicine, there is a very small chance of a vaccine causing a serious injury or death. The safety of vaccines is always being monitored. For more information, visit: www.cdc.gov/vaccinesafety/  
 
5. What if there is a serious reaction? What should I look for?  Look for anything that concerns you, such as signs of a severe allergic reaction, very high fever, or unusual behavior. Signs of a severe allergic reaction can include hives, swelling of the face and throat, difficulty breathing, a fast heartbeat, dizziness, and weakness  usually within a few minutes to a few hours after the vaccination. What should I do?  If you think it is a severe allergic reaction or other emergency that cant wait, call 9-1-1 or get the person to the nearest hospital. Otherwise, call your doctor. Reactions should be reported to the Vaccine Adverse Event Reporting System (VAERS). Your doctor should file this report, or you can do it yourself through the VAERS web site at www.vaers. hhs.gov, or by calling 6-416.915.5065. VAERS does not give medical advice.  
 
6. The National Vaccine Injury [employer] 
 
 The [employer] Injury Compensation Program (VICP) is a federal program that was created to compensate people who may have been injured by certain vaccines. Persons who believe they may have been injured by a vaccine can learn about the program and about filing a claim by calling 3-193.702.1727 or visiting the [employer] website at www.[facility].gov/vaccinecompensation. There is a time limit to file a claim for compensation. 7. How can I learn more?  Ask your healthcare provider. He or she can give you the vaccine package insert or suggest other sources of information.  Call your local or state health department.  Contact the Centers for Disease Control and Prevention (CDC): 
- Call 1-356.366.7469 (1-800-CDC-INFO) or 
- Visit CDCs website at www.cdc.gov/vaccines Vaccine Information Statement PCV13 Vaccine 11/5/2015  
42 KP Sprague [id number] Department of [facility] and [employer] Centers for Disease Control and Prevention Office Use Only Advance Directives: Care Instructions Your Care Instructions An advance directive is a legal way to state your wishes at the end of your life. It tells your family and your doctor what to do if you can no longer say what you want. There are two main types of advance directives. You can change them any time that your wishes change. · A living will tells your family and your doctor your wishes about life support and other treatment. · A durable power of  for health care lets you name a person to make treatment decisions for you when you can't speak for yourself. This person is called a health care agent. If you do not have an advance directive, decisions about your medical care may be made by a doctor or a  who doesn't know you. It may help to think of an advance directive as a gift to the people who care for you. If you have one, they won't have to make tough decisions by themselves. Follow-up care is a key part of your treatment and safety. Be sure to make and go to all appointments, and call your doctor if you are having problems. It's also a good idea to know your test results and keep a list of the medicines you take. How can you care for yourself at home? · Discuss your wishes with your loved ones and your doctor. This way, there are no surprises. · Many states have a unique form. Or you might use a universal form that has been approved by many states. This kind of form can sometimes be completed and stored online. Your electronic copy will then be available wherever you have a connection to the Internet. In most cases, doctors will respect your wishes even if you have a form from a different state. · You don't need a  to do an advance directive. But you may want to get legal advice. · Think about these questions when you prepare an advance directive: ¨ Who do you want to make decisions about your medical care if you are not able to? Many people choose a family member or close friend. ¨ Do you know enough about life support methods that might be used? If not, talk to your doctor so you understand. ¨ What are you most afraid of that might happen? You might be afraid of having pain, losing your independence, or being kept alive by machines. ¨ Where would you prefer to die? Choices include your home, a hospital, or a nursing home. ¨ Would you like to have information about hospice care to support you and your family? ¨ Do you want to donate organs when you die? ¨ Do you want certain Moravian practices performed before you die? If so, put your wishes in the advance directive. · Read your advance directive every year, and make changes as needed. When should you call for help? Be sure to contact your doctor if you have any questions. Where can you learn more? Go to http://fredrick-ankita.info/. Enter R264 in the search box to learn more about \"Advance Directives: Care Instructions. \" Current as of: October 6, 2017 Content Version: 11.7 © 1506-0689 [employer]. Care instructions adapted under license by [employer] (which disclaims liability or warranty for this information). If you have questions about a medical condition or this instruction, always ask your healthcare professional. [place] [address] any warranty or liability for your use of this information. Please provide this summary of care documentation to your next provider. Your primary care clinician is listed as WILTON Head. If you have any questions after today's visit, please call [phone].

## 2018-09-27 LAB
ALBUMIN SERPL-MCNC: 4.1 G/DL (ref 3.6–4.8)
ALBUMIN/GLOB SERPL: 1.1 {RATIO} (ref 1.2–2.2)
ALP SERPL-CCNC: 99 IU/L (ref 39–117)
ALT SERPL-CCNC: 16 IU/L (ref 0–44)
AST SERPL-CCNC: 14 IU/L (ref 0–40)
BACTERIA UA POCT, BACTPOCT: NORMAL
BILIRUB SERPL-MCNC: 0.4 MG/DL (ref 0–1.2)
BILIRUB UR QL STRIP: NEGATIVE
BUN SERPL-MCNC: 23 MG/DL (ref 8–27)
BUN/CREAT SERPL: 20 (ref 10–24)
CALCIUM SERPL-MCNC: 9.1 MG/DL (ref 8.6–10.2)
CASTS UA POCT: 0
CHLORIDE SERPL-SCNC: 103 MMOL/L (ref 96–106)
CHOLEST SERPL-MCNC: 160 MG/DL (ref 100–199)
CLUE CELLS, CLUEPOCT: NEGATIVE
CO2 SERPL-SCNC: 23 MMOL/L (ref 20–29)
CREAT SERPL-MCNC: 1.17 MG/DL (ref 0.76–1.27)
CRYSTALS UA POCT, CRYSPOCT: NEGATIVE
EPITHELIAL CELLS POCT: NEGATIVE
GLOBULIN SER CALC-MCNC: 3.6 G/DL (ref 1.5–4.5)
GLUCOSE SERPL-MCNC: 128 MG/DL (ref 65–99)
GLUCOSE UR-MCNC: NEGATIVE MG/DL
GRAN# POC: 3.4 K/UL (ref 2–7.8)
GRAN% POC: 70.7 % (ref 37–92)
HCT VFR BLD CALC: 38.4 % (ref 37–51)
HCV AB S/CO SERPL IA: <0.1 S/CO RATIO (ref 0–0.9)
HDLC SERPL-MCNC: 41 MG/DL
HGB BLD-MCNC: 13 G/DL (ref 12–18)
KETONES P FAST UR STRIP-MCNC: NEGATIVE MG/DL
LDLC SERPL CALC-MCNC: 107 MG/DL (ref 0–99)
LY# POC: 1.1 K/UL (ref 0.6–4.1)
LY% POC: 24.6 % (ref 10–58.5)
MCH RBC QN: 28.7 PG (ref 26–32)
MCHC RBC-ENTMCNC: 33.7 G/DL (ref 30–36)
MCV RBC: 85 FL (ref 80–97)
MID #, POC: 0.2 K/UL (ref 0–1.8)
MID% POC: 4.7 % (ref 0.1–24)
MUCUS UA POCT, MUCPOCT: NORMAL
PH UR STRIP: 6 [PH] (ref 5–7)
PLATELET # BLD: 285 K/UL (ref 140–440)
POTASSIUM SERPL-SCNC: 4.2 MMOL/L (ref 3.5–5.2)
PROT SERPL-MCNC: 7.7 G/DL (ref 6–8.5)
PROT UR QL STRIP: NEGATIVE
RBC # BLD: 4.52 M/UL (ref 4.2–6.3)
RBC UA POCT, RBCPOCT: 0
SODIUM SERPL-SCNC: 141 MMOL/L (ref 134–144)
SP GR UR STRIP: 1.02 (ref 1.01–1.02)
TRICH UA POCT, TRICHPOC: NEGATIVE
TRIGL SERPL-MCNC: 61 MG/DL (ref 0–149)
TSH SERPL DL<=0.005 MIU/L-ACNC: 0.95 UIU/ML (ref 0.45–4.5)
UA UROBILINOGEN AMB POC: NORMAL (ref 0.2–1)
URINALYSIS CLARITY POC: CLEAR
URINALYSIS COLOR POC: NORMAL
URINE BLOOD POC: NEGATIVE
URINE CULT COMMENT, POCT: NORMAL
URINE LEUKOCYTES POC: NEGATIVE
URINE NITRITES POC: NEGATIVE
VLDLC SERPL CALC-MCNC: 12 MG/DL (ref 5–40)
WBC # BLD: 4.7 K/UL (ref 4.1–10.9)
WBC UA POCT, WBCPOCT: 0
YEAST UA POCT, YEASTPOC: NEGATIVE

## 2019-01-04 ENCOUNTER — HOSPITAL ENCOUNTER (OUTPATIENT)
Dept: NUCLEAR MEDICINE | Age: 64
Discharge: HOME | End: 2019-01-04
Attending: UROLOGY
Payer: COMMERCIAL

## 2019-01-04 DIAGNOSIS — C61 PROSTATE CANCER (HCC): ICD-10-CM

## 2019-01-04 PROCEDURE — 78306 BONE IMAGING WHOLE BODY: CPT

## 2019-03-28 ENCOUNTER — OFFICE VISIT (OUTPATIENT)
Dept: INTERNAL MEDICINE CLINIC | Age: 64
End: 2019-03-28

## 2019-03-28 VITALS
HEIGHT: 70 IN | TEMPERATURE: 97.6 F | DIASTOLIC BLOOD PRESSURE: 86 MMHG | BODY MASS INDEX: 24.22 KG/M2 | WEIGHT: 169.2 LBS | SYSTOLIC BLOOD PRESSURE: 124 MMHG | RESPIRATION RATE: 20 BRPM

## 2019-03-28 DIAGNOSIS — C83.10 MANTLE CELL LYMPHOMA, UNSPECIFIED BODY REGION (HCC): ICD-10-CM

## 2019-03-28 DIAGNOSIS — C61 PROSTATE CANCER (HCC): ICD-10-CM

## 2019-03-28 DIAGNOSIS — I10 ESSENTIAL HYPERTENSION: Chronic | ICD-10-CM

## 2019-03-28 DIAGNOSIS — Z12.11 COLON CANCER SCREENING: ICD-10-CM

## 2019-03-28 DIAGNOSIS — E78.00 PURE HYPERCHOLESTEROLEMIA: Chronic | ICD-10-CM

## 2019-03-28 DIAGNOSIS — Z00.00 INITIAL MEDICARE ANNUAL WELLNESS VISIT: Primary | ICD-10-CM

## 2019-03-28 NOTE — PROGRESS NOTES
Chief Complaint   Patient presents with    Hypertension     6 mo fu    Annual Wellness Visit       Depression Risk Factor Screening:     3 most recent PHQ Screens 3/28/2019   Little interest or pleasure in doing things Not at all   Feeling down, depressed, irritable, or hopeless Not at all   Total Score PHQ 2 0       Functional Ability and Level of Safety:     Activities of Daily Living  ADL Assessment 3/28/2019   Feeding yourself No Help Needed   Getting from bed to chair No Help Needed   Getting dressed No Help Needed   Bathing or showering No Help Needed   Walk across the room (includes cane/walker) No Help Needed   Using the telphone No Help Needed   Taking your medications No Help Needed   Preparing meals No Help Needed   Managing money (expenses/bills) No Help Needed   Moderately strenuous housework (laundry) No Help Needed   Shopping for personal items (toiletries/medicines) No Help Needed   Shopping for groceries No Help Needed   Driving No Help Needed   Climbing a flight of stairs No Help Needed   Getting to places beyond walking distances No Help Needed       Fall Risk  Fall Risk Assessment, last 12 mths 3/28/2019   Able to walk? Yes   Fall in past 12 months? No       Abuse Screen  Abuse Screening Questionnaire 3/28/2019   Do you ever feel afraid of your partner? N   Are you in a relationship with someone who physically or mentally threatens you? N   Is it safe for you to go home?  Y         Patient Care Team   Patient Care Team:  Jo Sarmiento MD as PCP - General (Internal Medicine)  Tamica Garcia MD (Urology)  Dorothy Pardo MD (Hematology and Oncology)

## 2019-03-28 NOTE — PATIENT INSTRUCTIONS
The best way to stay healthy is to live a healthy lifestyle. A healthy lifestyle includes regular exercise, eating a well-balanced diet, keeping a healthy weight and not smoking. Regular physical exams and screening tests are another important way to take care of yourself. Preventive exams provided by health care providers can find health problems early when treatment works best and can keep you from getting certain diseases or illnesses. Preventive services include exams, lab tests, screenings, shots, monitoring and information to help you take care of your own health. All people over 65 should have a pneumonia shot. Pneumonia shots are usually only needed once in a lifetime unless your doctor decides differently. In addition to your physical exam, some screening tests are recommended:    All people over 65 should have a yearly flu shot. People over 65 are at medium to high risk for Hepatitis B. Three shots are needed for complete protection. Bone mass measurement (dexa scan) is recommended every two years. Diabetes Mellitus screening is recommended every year. Glaucoma is an eye disease caused by high pressure in the eye. An eye exam is recommended every year. Cardiovascular screening tests that check your cholesterol and other blood fat (lipid) levels are recommended every five years. Colorectal Cancer screening tests help to find pre-cancerous polyps (growths in the colon) so they can be removed before they turn into cancer. Tests ordered for screening depend on your personal and family history risk factors. Prostate Cancer Screening (annually up to age 76)    Screening for breast cancer is recommended yearly with a Mammogram.    Screening for cervical and vaginal cancer is recommended with a pelvic and Pap test every two years.  However if you have had an abnormal pap in the past  three years or at high risk for cervical or vaginal cancer Medicare will cover a pap test and a pelvic exam every year. Here is a list of your current Health Maintenance items with a due date:  Health Maintenance Due   Topic Date Due    DTaP/Tdap/Td  (1 - Tdap) 02/19/1974    Shingles Vaccine (1 of 2) 02/19/2003    Stool testing for trace blood  02/19/2003    Glaucoma Screening   02/19/2018    Annual Well Visit  10/15/2018          Home Blood Pressure Test: About This Test  What is it? A home blood pressure test allows you to keep track of your blood pressure at home. Blood pressure is a measure of the force of blood against the walls of your arteries. Blood pressure readings include two numbers, such as 130/80 (say \"130 over 80\"). The first number is the systolic pressure. The second number is the diastolic pressure. Why is this test done? You may do this test at home to:  · Find out if you have high blood pressure. · Track your blood pressure if you have high blood pressure. · Track how well medicine is working to reduce high blood pressure. · Check how lifestyle changes, such as weight loss and exercise, are affecting blood pressure. How can you prepare for the test?  · Do not use caffeine, tobacco, or medicines known to raise blood pressure (such as nasal decongestant sprays) for at least 30 minutes before taking your blood pressure. · Do not exercise for at least 30 minutes before taking your blood pressure. What happens before the test?  Take your blood pressure while you feel comfortable and relaxed. Sit quietly with both feet on the floor for at least 5 minutes before the test.  What happens during the test?  · Sit with your arm slightly bent and resting on a table so that your upper arm is at the same level as your heart. · Roll up your sleeve or take off your shirt to expose your upper arm. · Wrap the blood pressure cuff around your upper arm so that the lower edge of the cuff is about 1 inch above the bend of your elbow.   Proceed with the following steps depending on if you are using an automatic or manual pressure monitor. Automatic blood pressure monitors  · Press the on/off button on the automatic monitor and wait until the ready-to-measure \"heart\" symbol appears next to zero in the display window. · Press the start button. The cuff will inflate and deflate by itself. · Your blood pressure numbers will appear on the screen. · Write your numbers in your log book, along with the date and time. Manual blood pressure monitors  · Place the earpieces of a stethoscope in your ears, and place the bell of the stethoscope over the artery, just below the cuff. · Close the valve on the rubber inflating bulb. · Squeeze the bulb rapidly with your opposite hand to inflate the cuff until the dial or column of mercury reads about 30 mm Hg higher than your usual systolic pressure. If you do not know your usual pressure, inflate the cuff to 210 mm Hg or until the pulse at your wrist disappears. · Open the pressure valve just slightly by twisting or pressing the valve on the bulb. · As you watch the pressure slowly fall, note the level on the dial at which you first start to hear a pulsing or tapping sound through the stethoscope. This is your systolic blood pressure. · Continue letting the air out slowly. The sounds will become muffled and will finally disappear. Note the pressure when the sounds completely disappear. This is your diastolic blood pressure. Let out all the remaining air. · Write your numbers in your log book, along with the date and time. What else should you know about the test?  It is more accurate to take the average of several readings made throughout the day than to rely on a single reading. It's normal for blood pressure to go up and down throughout the day. Follow-up care is a key part of your treatment and safety. Be sure to make and go to all appointments, and call your doctor if you are having problems.  It's also a good idea to keep a list of the medicines you take.  Where can you learn more? Go to http://fredrick-ankita.info/. Enter C427 in the search box to learn more about \"Home Blood Pressure Test: About This Test.\"  Current as of: July 22, 2018  Content Version: 11.9  © 3678-6640 [employer]. Care instructions adapted under license by [employer] (which disclaims liability or warranty for this information). If you have questions about a medical condition or this instruction, always ask your healthcare professional. [place] [address] any warranty or liability for your use of this information.

## 2019-03-28 NOTE — PROGRESS NOTES
This note will not be viewable in [address] Chevy Gilbert is a 75 y.o. male who presents for an Initial Medicare Annual Wellness Exam (AWV) and follow up of chronic medical conditions. The patient has not had a Medicare wellness exam done with last year    I have reviewed the patient's medical history in detail and updated the computerized patient record. History     Subjective:  Mr. Monika Zaragoza is a 69-year-old male who presents the office today for Medicare wellness exam and follow-up of multiple medical problems. The patient has hypertension and remains on Inderal and amlodipine. He does take a potassium supplement. He tolerates this regimen without fatigue or palpitations, orthostatic dizziness or lower extremity edema. He has had no headaches, numbness, tingling or focal neurological problems. He has hyperlipidemia. He currently is not on cholesterol-lowering medication. He tries to control this with diet and weight control. He denies any exertional chest pains or claudication    The patient has a history of mantle cell lymphoma. He is followed by an oncologist.  He recently had lab studies which were generally unrevealing and CT scans which showed slight increase in lymph node enlargement throughout the body. He currently is not under treatment. He denies any fevers, night sweats or unexplained weight loss. The patient has a history of prostate cancer for which he has been treated with a [address] prostatectomy. He continues to be monitored with yearly PSAs by his urologist.    There is prior history of colon polyps. He has not had a colonoscopy in many years and is due to have this done.     Patient Active Problem List   Diagnosis Code    Sepsis(995.91) A41.9    Cellulitis, neck L03.221    HTN (hypertension) I10    Prostatism N40.0    Colon polyp K63.5    Narcolepsy G47.419    HNP (herniated nucleus pulposus) [id number]    ED (erectile dysfunction) of organic origin N52.9    Hypokalemia E87.6    Mantle cell lymphoma (HCC) C83.10    Hyperlipidemia E78.5    Cellulitis of neck L03.221    Prostate cancer (HCC) C61    Chronic rhinitis J31.0    Benign non-nodular prostatic hyperplasia without lower urinary tract symptoms N40.0       Patient Care Team:  Jeffery Root MD as PCP - General (Internal Medicine)  Elsie Narayanan MD (Urology)  Dawson Lagos MD (Hematology and Oncology)    Past Medical History:   Diagnosis Date    Benign non-nodular prostatic hyperplasia without lower urinary tract symptoms 8/21/2017    Cancer [facility])     prostate - surgery/lymphoma    Cellulitis of neck 8/21/2017    Chronic rhinitis 8/21/2017    Colon polyp 8/21/2017    ED (erectile dysfunction) of organic origin 8/21/2017    GERD (gastroesophageal reflux disease)     HNP (herniated nucleus pulposus) 8/21/2017    HTN (hypertension) 9/1/2012    Hyperlipidemia 8/21/2017    Hypertension     Hypokalemia 8/21/2017    Mantle cell lymphoma ([facility] [address] 8/21/2017    Narcolepsy 8/21/2017    Other ill-defined conditions(799.89)     hand laceration sutured    Prostate cancer ([facility] [address] 8/21/2017     Past Surgical History:   Procedure Laterality Date    HX OTHER SURGICAL      surgery for lymphoma - abdomen    HX PROSTATECTOMY       Allergies   Allergen Reactions    Lisinopril Cough     Current Outpatient Medications   Medication Sig Dispense Refill    propranolol (INDERAL) 40 mg tablet TAKE ONE TABLET BY MOUTH TWICE DAILY 180 Tab 3    potassium chloride (KLOR-CON M20) 20 mEq tablet TAKE ONE TABLET BY MOUTH THREE TIMES DAILY 270 Tab 3    amLODIPine (NORVASC) 10 mg tablet Take 1 Tab by mouth daily.  80 Tab 3     Social History     Socioeconomic History    Marital status:      Spouse name: Not on file    Number of children: Not on file    Years of education: Not on file    Highest education level: Not on file   Occupational History    Occupation:      Employer: [employer]   Tobacco Use    Smoking status: Never Smoker    Smokeless tobacco: Never Used   Substance and Sexual Activity    Alcohol use: No    Drug use: No    Sexual activity: Yes     Partners: Female     Family History   Problem Relation Age of Onset    Cancer Maternal Uncle         prostate    Stroke Maternal Uncle     Stroke Maternal Grandmother      Health Maintenance   Topic Date Due    DTaP/Tdap/Td series (1 - Tdap) 02/19/1974    Shingrix Vaccine Age 50> (1 of 2) 02/19/2003    FOBT Q 1 YEAR AGE 50-75  02/19/2003    GLAUCOMA SCREENING Q2Y  02/19/2018    MEDICARE YEARLY EXAM  10/15/2018    Pneumococcal 65+ years (2 of 2 - PPSV23) 09/26/2019    Hepatitis C Screening  Completed    Influenza Age 5 to Adult  Completed          Review of Systems  Constitutional: negative for fevers, chills, anorexia and weight loss  Eyes:   negative for visual disturbance and irritation  ENT:   negative for tinnitus,sore throat,nasal congestion,ear pain,hoarseness  Respiratory:  negative for cough, hemoptysis, dyspnea,wheezing  CV:   negative for chest pain, palpitations, lower extremity edema  GI:   negative for nausea, vomiting, diarrhea, abdominal pain,melena  Endo:               negative for polyuria,polydipsia,polyphagia,heat intolerance  Genitourinary: negative for frequency, dysuria and hematuria  Integumentary: negative for rash and pruritus  Hematologic:  negative for easy bruising and gum/nose bleeding  Musculoskel: negative for myalgias, arthralgias, back pain, muscle weakness, joint pain  Neurological:  negative for headaches, dizziness, vertigo, memory problems and gait   Behavl/Psych: negative for feelings of anxiety, depression, mood changes  ROS otherwise negative      Depression Risk Factor Screening:     3 most recent PHQ Screens 3/28/2019   Little interest or pleasure in doing things Not at all   Feeling down, depressed, irritable, or hopeless Not at all   Total Score PHQ 2 0       Alcohol Risk Factor Screening:    You do not drink alcohol or very rarely. Functional Ability and Level of Safety:   Hearing Loss  The patient needs further evaluation. Activities of Daily Living  ADL Assessment 3/28/2019   Feeding yourself No Help Needed   Getting from bed to chair No Help Needed   Getting dressed No Help Needed   Bathing or showering No Help Needed   Walk across the room (includes cane/walker) No Help Needed   Using the telphone No Help Needed   Taking your medications No Help Needed   Preparing meals No Help Needed   Managing money (expenses/bills) No Help Needed   Moderately strenuous housework (laundry) No Help Needed   Shopping for personal items (toiletries/medicines) No Help Needed   Shopping for groceries No Help Needed   Driving No Help Needed   Climbing a flight of stairs No Help Needed   Getting to places beyond walking distances No Help Needed       Fall Risk  Fall Risk Assessment, last 12 mths 3/28/2019   Able to walk? Yes   Fall in past 12 months? No       Abuse Screen  Abuse Screening Questionnaire 3/28/2019   Do you ever feel afraid of your partner? N   Are you in a relationship with someone who physically or mentally threatens you? N   Is it safe for you to go home? Y       Cognitive Screening   Evaluation of Cognitive Function:  Has your family/caregiver stated any concerns about your memory: no    Physical Exam     Visit Vitals  /86   Temp 97.6 °F (36.4 °C) (Oral)   Resp 20   Ht 5' 10\" (1.778 m)   Wt 169 lb 3.2 oz (76.7 kg)   BMI 24.28 kg/m²     Body mass index is 24.28 kg/m².      General appearance - alert, well appearing, and in no distress  Mental status - alert, oriented to person, place, and time  EYE-SAI, EOMI,conjunctiva normal bilaterally, lids normal  ENT-ENT exam normal, no neck nodes or sinus tenderness  Nose - normal and patent, no erythema,  Or discharge   Mouth - mucous membranes moist, pharynx normal without lesions  Neck - supple, no significant adenopathy or bruit  Chest - clear to auscultation, no wheezes, rales or rhonchi. Heart - normal rate, regular rhythm, normal S1, S2, no murmurs, rubs, clicks or gallops   Abdomen - soft, nontender, nondistended, no masses or organomegaly  Lymph- no adenopathy palpable  Ext-peripheral pulses normal, no pedal edema, no clubbing or cyanosis  Skin-Warm and dry. no hyperpigmentation, vitiligo, or suspicious lesions  Neuro -alert, oriented, normal speech, no focal findings or movement disorder noted    Assessment/Plan   [place] education and counseling provided:  Age appropriate evidence-based preventive care recommendations based on today's review and evaluation; including relevant cancer screening guidelines, and vaccination recommendations. An After Visit Summary was printed and given to the patient which information about these guidelines, and a personalized schedule for health maintenance items. Whe appropriate and with patient agreement, orders noted below were placed to complete missing health maintenance items. Additional Plan for follow up chronic medical conditions includes:  Diagnoses and all orders for this visit:    Initial Medicare annual wellness visit    Essential hypertension    Pure hypercholesterolemia    Mantle cell lymphoma, unspecified body region [facility])    Prostate cancer ([facility] [address]    Colon cancer screening  -     REFERRAL TO GASTROENTEROLOGY          Other instructions: The patient's medications were reviewed and reconciled. No change in his current medical regimen is made. A no added salt, prudent diet is encouraged    Health maintenance issues were reviewed. The patient is due for colonoscopy and we will arrange to have him see a GI specialist to have this done. The patient is overdue for glaucoma screening and he will arrange to have an eye exam and a copy of this report forwarded to us. Age-appropriate vaccinations were reviewed.   He believes that he had a tetanus shot 5 years ago but does not remember quite where he got it and will seek out records to see if he can give us a date. Have also recommended shingles vaccine. Copies of his labs and CT scan report from his oncologist from earlier this month were reviewed and a copy will be included in his electronic record. Follow-up 6 months    Follow-up and Dispositions    · Return in about 6 months (around 9/28/2019). I have reviewed with the patient details of the assessment and plan and all questions were answered. Relevent patient education was performed. The most recent lab findings were reviewed with the patient.     Monroe Addison MD

## 2019-04-11 ENCOUNTER — TELEPHONE (OUTPATIENT)
Dept: INTERNAL MEDICINE CLINIC | Age: 64
End: 2019-04-11

## 2019-04-11 NOTE — TELEPHONE ENCOUNTER
Patient is calling, he is requesting a call back from the nurse on the status of his cologuard kit. He has not received it yet and would like to know if there is a status on it.     Best call back # for patient:  [phone]

## 2019-04-11 NOTE — TELEPHONE ENCOUNTER
Patient was concerned as to when his colonoscopy was scheduled for I explained he needed to go for his consultation on the 15 th first

## 2019-07-02 ENCOUNTER — ANESTHESIA EVENT (OUTPATIENT)
Dept: ENDOSCOPY | Age: 64
End: 2019-07-02
Payer: COMMERCIAL

## 2019-07-03 ENCOUNTER — HOSPITAL ENCOUNTER (OUTPATIENT)
Age: 64
Setting detail: OUTPATIENT SURGERY
Discharge: HOME | End: 2019-07-03
Attending: INTERNAL MEDICINE | Admitting: INTERNAL MEDICINE
Payer: COMMERCIAL

## 2019-07-03 ENCOUNTER — ANESTHESIA (OUTPATIENT)
Dept: ENDOSCOPY | Age: 64
End: 2019-07-03
Payer: COMMERCIAL

## 2019-07-03 VITALS
BODY MASS INDEX: 25.9 KG/M2 | TEMPERATURE: 97.4 F | OXYGEN SATURATION: 97 % | SYSTOLIC BLOOD PRESSURE: 150 MMHG | WEIGHT: 165 LBS | HEIGHT: 67 IN | DIASTOLIC BLOOD PRESSURE: 98 MMHG | RESPIRATION RATE: 16 BRPM | HEART RATE: 63 BPM

## 2019-07-03 PROCEDURE — 74011250636 HC RX REV CODE- 250/636

## 2019-07-03 PROCEDURE — 76060000031 HC ANESTHESIA FIRST 0.5 HR: Performed by: INTERNAL MEDICINE

## 2019-07-03 PROCEDURE — 88305 TISSUE EXAM BY PATHOLOGIST: CPT

## 2019-07-03 PROCEDURE — 76040000019: Performed by: INTERNAL MEDICINE

## 2019-07-03 PROCEDURE — 77030013992 HC SNR POLYP ENDOSC BSC -B: Performed by: INTERNAL MEDICINE

## 2019-07-03 PROCEDURE — 74011250636 HC RX REV CODE- 250/636: Performed by: INTERNAL MEDICINE

## 2019-07-03 RX ORDER — EPINEPHRINE 0.1 MG/ML
1 INJECTION INTRACARDIAC; INTRAVENOUS
Status: DISCONTINUED | OUTPATIENT
Start: 2019-07-03 | End: 2019-07-03 | Stop reason: HOSPADM

## 2019-07-03 RX ORDER — NALOXONE HYDROCHLORIDE 0.4 MG/ML
0.4 INJECTION, SOLUTION INTRAMUSCULAR; INTRAVENOUS; SUBCUTANEOUS
Status: DISCONTINUED | OUTPATIENT
Start: 2019-07-03 | End: 2019-07-03 | Stop reason: HOSPADM

## 2019-07-03 RX ORDER — DEXTROMETHORPHAN/PSEUDOEPHED 2.5-7.5/.8
1.2 DROPS ORAL
Status: DISCONTINUED | OUTPATIENT
Start: 2019-07-03 | End: 2019-07-03 | Stop reason: HOSPADM

## 2019-07-03 RX ORDER — SODIUM CHLORIDE 0.9 % (FLUSH) 0.9 %
5-40 SYRINGE (ML) INJECTION AS NEEDED
Status: DISCONTINUED | OUTPATIENT
Start: 2019-07-03 | End: 2019-07-03 | Stop reason: HOSPADM

## 2019-07-03 RX ORDER — PROPOFOL 10 MG/ML
INJECTION, EMULSION INTRAVENOUS AS NEEDED
Status: DISCONTINUED | OUTPATIENT
Start: 2019-07-03 | End: 2019-07-03 | Stop reason: HOSPADM

## 2019-07-03 RX ORDER — FLUMAZENIL 0.1 MG/ML
0.2 INJECTION INTRAVENOUS
Status: DISCONTINUED | OUTPATIENT
Start: 2019-07-03 | End: 2019-07-03 | Stop reason: HOSPADM

## 2019-07-03 RX ORDER — SODIUM CHLORIDE 9 MG/ML
75 INJECTION, SOLUTION INTRAVENOUS CONTINUOUS
Status: DISCONTINUED | OUTPATIENT
Start: 2019-07-03 | End: 2019-07-03 | Stop reason: HOSPADM

## 2019-07-03 RX ORDER — SODIUM CHLORIDE 0.9 % (FLUSH) 0.9 %
5-40 SYRINGE (ML) INJECTION EVERY 8 HOURS
Status: DISCONTINUED | OUTPATIENT
Start: 2019-07-03 | End: 2019-07-03 | Stop reason: HOSPADM

## 2019-07-03 RX ORDER — ATROPINE SULFATE 0.1 MG/ML
0.5 INJECTION INTRAVENOUS
Status: DISCONTINUED | OUTPATIENT
Start: 2019-07-03 | End: 2019-07-03 | Stop reason: HOSPADM

## 2019-07-03 RX ORDER — LIDOCAINE HYDROCHLORIDE 20 MG/ML
INJECTION, SOLUTION EPIDURAL; INFILTRATION; INTRACAUDAL; PERINEURAL AS NEEDED
Status: DISCONTINUED | OUTPATIENT
Start: 2019-07-03 | End: 2019-07-03 | Stop reason: HOSPADM

## 2019-07-03 RX ADMIN — SODIUM CHLORIDE 75 ML/HR: 900 INJECTION, SOLUTION INTRAVENOUS at 12:39

## 2019-07-03 RX ADMIN — LIDOCAINE HYDROCHLORIDE 40 MG: 20 INJECTION, SOLUTION EPIDURAL; INFILTRATION; INTRACAUDAL; PERINEURAL at 13:08

## 2019-07-03 RX ADMIN — PROPOFOL 90 MG: 10 INJECTION, EMULSION INTRAVENOUS at 13:16

## 2019-07-03 RX ADMIN — PROPOFOL 110 MG: 10 INJECTION, EMULSION INTRAVENOUS at 13:09

## 2019-07-03 NOTE — PERIOP NOTES
Anesthesia reports 200mg Propofol, 40mg Lidocaine and 400mL NS given during procedure. Received report from anesthesia staff on vital signs and status of patient.

## 2019-07-03 NOTE — ANESTHESIA POSTPROCEDURE EVALUATION
Procedure(s):  COLONOSCOPY  ENDOSCOPIC POLYPECTOMY. total IV anesthesia, MAC    Anesthesia Post Evaluation        Patient location during evaluation: PACU  Note status: Adequate. Level of consciousness: responsive to verbal stimuli and sleepy but conscious  Pain management: satisfactory to patient  Airway patency: patent  Anesthetic complications: no  Cardiovascular status: acceptable  Respiratory status: acceptable  Hydration status: acceptable  Comments: +Post-Anesthesia Evaluation and Assessment    Patient: Vijay Lovell MRN: [id number]  SSN: [id number]   YOB: 1955  Age: 75 y.o. Sex: male      Cardiovascular Function/Vital Signs    /88   Pulse 71   Temp 36.4 °C (97.6 °F)   Resp 13   Ht 5' 7\" (1.702 m)   Wt 74.8 kg (165 lb)   SpO2 99%   BMI 25.84 kg/m²     Patient is status post Procedure(s):  COLONOSCOPY  ENDOSCOPIC POLYPECTOMY. Nausea/Vomiting: Controlled. Postoperative hydration reviewed and adequate. Pain:  Pain Scale 1: Numeric (0 - 10) (07/03/19 1332)  Pain Intensity 1: 0 (07/03/19 1332)   Managed. Neurological Status: At baseline. Mental Status and Level of Consciousness: Arousable. Pulmonary Status:   O2 Device: Room air (07/03/19 1332)   Adequate oxygenation and airway patent. Complications related to anesthesia: None    Post-anesthesia assessment completed. No concerns. Signed By: Carlos Zaragoza MD    7/3/2019  Post anesthesia nausea and vomiting:  controlled      Vitals Value Taken Time   BP 0/0 7/3/2019  1:41 PM   Temp     Pulse 67 7/3/2019  1:42 PM   Resp 9 7/3/2019  1:42 PM   SpO2 100 % 7/3/2019  1:42 PM   Vitals shown include unvalidated device data.

## 2019-07-03 NOTE — ROUTINE PROCESS
Lacie Desirae  1955  [id number]    Situation:  Verbal report received from: Katharina Rojas RN  Procedure: Procedure(s):  COLONOSCOPY  ENDOSCOPIC POLYPECTOMY    Background:    Preoperative diagnosis: COLON POLYPS, MANTLE CELL LYMPHOMA  Postoperative diagnosis: hemorrhoids, polyp    :  Dr. Braden Rhodes  Assistant(s): Endoscopy Technician-1: Hilaria Deleon  Endoscopy RN-1: Sakshi Bowman RN    Specimens:   ID Type Source Tests Collected by Time Destination   1 : rectal polyp Preservative Rectum  Navya Albarran MD 7/3/2019 1323 Pathology     H. Pylori  no    Assessment:  Intra-procedure medications     Anesthesia gave intra-procedure sedation and medications, see anesthesia flow sheet yes    Intravenous fluids: NS@ KVO     Vital signs stable       Abdominal assessment: round and soft       Recommendation:  Discharge patient per MD order  Family or Friend Wife Kimberly Noe to share finding with family or friend yes

## 2019-07-03 NOTE — PROCEDURES
NAME:  Prasad Michaels   :   1955   MRN:   [id number]     Date/Time:  7/3/2019 1:23 PM    Colonoscopy Operative Report    Procedure Type:   Colonoscopy with polypectomy (cold snare)     Indications:     Personal history of colon polyps (screening only)  Pre-operative Diagnosis: see indication above  Post-operative Diagnosis:  See findings below  :  Rommel Ruiz MD  Referring Provider: --Josselyn Jamil MD    Exam:  Airway: clear, no airway problems anticipated  Heart: RRR, without gallops or rubs  Lungs: clear bilaterally without wheezes, crackles, or rhonchi  Abdomen: soft, nontender, nondistended, bowel sounds present  Mental Status: awake, alert and oriented to person, place and time    Sedation:  MAC anesthesia Propofol  Procedure Details:  After informed consent was obtained with all risks and benefits of procedure explained and preoperative exam completed, the patient was taken to the endoscopy suite and placed in the left lateral decubitus position. Upon sequential sedation as per above, a digital rectal exam was performed demonstrating internal hemorrhoids. The Olympus videocolonoscope  was inserted in the rectum and carefully advanced to the cecum, which was identified by the ileocecal valve and appendiceal orifice. The quality of preparation was good. The colonoscope was slowly withdrawn with careful evaluation between folds. Retroflexion in the rectum was completed demonstrating internal hemorrhoids. Findings:   1. 5 mm polyp in rectum. Removed by cold snare polypectomy  2. Medium sized internal hemorrhoids seen on retroflexion  3. Otherwise normal colonoscopy through to the cecum    Specimen Removed:  1. Rectal polyp  Complications: None. EBL:  None. Impression:   1. 5 mm polyp in rectum. Removed by cold snare polypectomy  2. Medium sized internal hemorrhoids seen on retroflexion  3. Otherwise normal colonoscopy through to the cecum    Recommendations:   1. Follow up pathology  2. Repeat colonoscopy in 5 years for surveillance    Discharge Disposition:  Home in the company of a  when able to ambulate.       Kang Lang MD

## 2019-07-03 NOTE — ANESTHESIA PREPROCEDURE EVALUATION
Relevant Problems   No relevant active problems       Anesthetic History   No history of anesthetic complications            Review of Systems / Medical History  Patient summary reviewed, nursing notes reviewed and pertinent labs reviewed    Pulmonary  Within defined limits                 Neuro/Psych   Within defined limits           Cardiovascular    Hypertension: well controlled          Hyperlipidemia    Exercise tolerance: >4 METS     GI/Hepatic/Renal     GERD: poorly controlled           Endo/Other        Anemia     Other Findings   Comments: Lymphoma and Prostate Cancer    Narcolepsy          Physical Exam    Airway  Mallampati: I  TM Distance: > 6 cm  Neck ROM: normal range of motion   Mouth opening: Normal     Cardiovascular    Rhythm: regular  Rate: normal         Dental    Dentition: Upper dentition intact and Lower dentition intact  Comments: Some missing teeth, denies any loose   Pulmonary  Breath sounds clear to auscultation               Abdominal  GI exam deferred       Other Findings            Anesthetic Plan    ASA: 3  Anesthesia type: total IV anesthesia          Induction: Intravenous  Anesthetic plan and risks discussed with: Patient

## 2019-07-03 NOTE — H&P
Gastroenterology Outpatient History and Physical    Patient: Anthony Nolen    Physician: Joe Pak MD    Chief Complaint: H/o colon polyps  History of Present Illness: No GI complaints    History:  Past Medical History:   Diagnosis Date    Benign non-nodular prostatic hyperplasia without lower urinary tract symptoms 8/21/2017    Cancer [facility])     prostate - surgery/lymphoma    Cellulitis of neck 8/21/2017    Chronic rhinitis 8/21/2017    Colon polyp 8/21/2017    ED (erectile dysfunction) of organic origin 8/21/2017    GERD (gastroesophageal reflux disease)     HNP (herniated nucleus pulposus) 8/21/2017    HTN (hypertension) 9/1/2012    Hyperlipidemia 8/21/2017    Hypertension     Hypokalemia 8/21/2017    Mantle cell lymphoma ([facility] [address] 8/21/2017    Narcolepsy 8/21/2017    Other ill-defined conditions(799.89)     hand laceration sutured    Prostate cancer ([facility] [address] 8/21/2017      Past Surgical History:   Procedure Laterality Date    HX OTHER SURGICAL      surgery for lymphoma - abdomen    HX PROSTATECTOMY        Social History     Socioeconomic History    Marital status:      Spouse name: Not on file    Number of children: Not on file    Years of education: Not on file    Highest education level: Not on file   Occupational History    Occupation: [employer]     Employer: [employer]   Tobacco Use    Smoking status: Never Smoker    Smokeless tobacco: Never Used   Substance and Sexual Activity    Alcohol use: No    Drug use: No    Sexual activity: Yes     Partners: Female      Family History   Problem Relation Age of Onset    Cancer Maternal Uncle         prostate    Stroke Maternal Uncle     Stroke Maternal Grandmother       Patient Active Problem List   Diagnosis Code    Sepsis(995.91) A41.9    Cellulitis, neck L03.221    HTN (hypertension) I10    Prostatism N40.0    Colon polyp K63.5    Narcolepsy G47.419    HNP (herniated nucleus pulposus) [id number]    ED (erectile dysfunction) of organic origin N52.9    Hypokalemia E87.6    Mantle cell lymphoma (HCC) C83.10    Hyperlipidemia E78.5    Cellulitis of neck L03.221    Prostate cancer (HCC) C61    Chronic rhinitis J31.0    Benign non-nodular prostatic hyperplasia without lower urinary tract symptoms N40.0       Allergies: Allergies   Allergen Reactions    Lisinopril Cough     Medications:   Prior to Admission medications    Medication Sig Start Date End Date Taking? Authorizing Provider   propranolol (INDERAL) 40 mg tablet TAKE ONE TABLET BY MOUTH TWICE DAILY 9/26/18  Yes Richi Ervin MD   potassium chloride (KLOR-CON M20) 20 mEq tablet TAKE ONE TABLET BY MOUTH THREE TIMES DAILY 9/26/18  Yes Ricih Ervin MD   amLODIPine (NORVASC) 10 mg tablet Take 1 Tab by mouth daily. 9/26/18  Yes Richi Ervin MD     Physical Exam:   Vital Signs: Blood pressure (!) 151/91, pulse 77, temperature 97.6 °F (36.4 °C), resp. rate 20, height 5' 7\" (1.702 m), weight 74.8 kg (165 lb), SpO2 99 %.   General: well developed, well nourished   HEENT: unremarkable   Heart: regular rhythm no mumur    Lungs: clear   Abdominal:  benign   Neurological: unremarkable   Extremities: no edema     Findings/Diagnosis: H/o colon polyps  Plan of Care/Planned Procedure: Colonoscopy with conscious/deep sedation    Signed:  Ramirez Mahoney MD 7/3/2019

## 2019-07-03 NOTE — DISCHARGE INSTRUCTIONS
India Peguero  [id number]  1955    COLON DISCHARGE INSTRUCTIONS  Discomfort:  Redness at IV site- apply warm compress to area; if redness or soreness persist- contact your physician  There may be a slight amount of blood passed from the rectum  Gaseous discomfort- walking, belching will help relieve any discomfort  You may not operate a vehicle for 12 hours  You may not engage in an occupation involving machinery or appliances for rest of today  You may not drink alcoholic beverages for at least 12 hours  Avoid making any critical decisions for at least 24 hour  DIET:   Regular diet. - however -  remember your colon is empty and a heavy meal will produce gas. Avoid these foods:  vegetables, fried / greasy foods, carbonated drinks for today  MEDICATION:  Per Medication Reconciliation       ACTIVITY:  You may not resume your normal daily activities until tomorrow AM; it is recommended that you spend the remainder of the day resting -  avoid any strenuous activity. CALL M.D. ANY SIGN OF:   Increasing pain, nausea, vomiting  Abdominal distension (swelling)  New increased bleeding (oral or rectal)  Fever (chills)    You may not  take any Advil, Ibuprofen, Motrin, Aleve, or Goodys for 10 days, ONLY  Tylenol as needed for pain. IMPRESSION:  Impression:   1. 5 mm polyp in rectum. Removed by cold snare polypectomy  2. Medium sized internal hemorrhoids seen on retroflexion  3. Otherwise normal colonoscopy through to the cecum    Recommendations:   1. Follow up pathology  2.  Repeat colonoscopy in 5 years for surveillance    Follow-up Instructions:   Call Dr. Mercy Espinoza for the results of procedure / biopsy in 7-10 days  Telephone # [phone]    Rhoda Garduno MD

## 2019-10-03 ENCOUNTER — OFFICE VISIT (OUTPATIENT)
Dept: INTERNAL MEDICINE CLINIC | Age: 64
End: 2019-10-03

## 2019-10-03 VITALS
SYSTOLIC BLOOD PRESSURE: 132 MMHG | HEART RATE: 63 BPM | WEIGHT: 169.2 LBS | OXYGEN SATURATION: 94 % | BODY MASS INDEX: 26.56 KG/M2 | RESPIRATION RATE: 16 BRPM | TEMPERATURE: 97.9 F | HEIGHT: 67 IN | DIASTOLIC BLOOD PRESSURE: 72 MMHG

## 2019-10-03 DIAGNOSIS — I10 ESSENTIAL HYPERTENSION: Primary | Chronic | ICD-10-CM

## 2019-10-03 DIAGNOSIS — E78.00 PURE HYPERCHOLESTEROLEMIA: Chronic | ICD-10-CM

## 2019-10-03 DIAGNOSIS — C83.10 MANTLE CELL LYMPHOMA, UNSPECIFIED BODY REGION (HCC): ICD-10-CM

## 2019-10-03 DIAGNOSIS — C61 PROSTATE CANCER (HCC): ICD-10-CM

## 2019-10-03 NOTE — PATIENT INSTRUCTIONS

## 2019-10-03 NOTE — PROGRESS NOTES
This note will not be viewable in [address] Subjective:      presents to the office today in general medical follow-up. Patient has hypertension and remains on propranolol and amlodipine. He is tolerating this without fatigue or palpitations, orthostatic dizziness or lower extremity edema. He denies headaches, numbness, tingling or focal neurological problems. He has a dyslipidemia and recently had laboratory studies done at his employer's revealing a total cholesterol of 188 and an HDL of 30. His fasting blood sugar was 93. Patient follows a prudent diet and is physically active during the day. He has no history of coronary artery disease and denies exertional chest pains or claudication.     He also has a history of prostate cancer and mantle cell lymphoma both of which are being followed by specialist and he has had no recurrence of these problems    Past Medical History:   Diagnosis Date    Benign non-nodular prostatic hyperplasia without lower urinary tract symptoms 8/21/2017    Cancer [facility])     prostate - surgery/lymphoma    Cellulitis of neck 8/21/2017    Chronic rhinitis 8/21/2017    Colon polyp 8/21/2017    ED (erectile dysfunction) of organic origin 8/21/2017    GERD (gastroesophageal reflux disease)     HNP (herniated nucleus pulposus) 8/21/2017    HTN (hypertension) 9/1/2012    Hyperlipidemia 8/21/2017    Hypertension     Hypokalemia 8/21/2017    Mantle cell lymphoma ([address] 8/21/2017    Narcolepsy 8/21/2017    Other ill-defined conditions(799.89)     hand laceration sutured    Prostate cancer ([address] 8/21/2017     Past Surgical History:   Procedure Laterality Date    COLONOSCOPY N/A 7/3/2019    COLON[facility]COPY performed by Tarik [facility] MD at [facility] ENDOSCOPY    HX OTHER SURGICAL      surgery for lymphoma - abdomen    HX PROSTATECTOMY       Allergies   Allergen Reactions    Lisinopril Cough     Current Outpatient Medications   Medication Sig Dispense Refill    propranolol (INDERAL) 40 mg tablet TAKE ONE TABLET BY MOUTH TWICE DAILY 180 Tab 3    potassium chloride (KLOR-CON M20) 20 mEq tablet TAKE ONE TABLET BY MOUTH THREE TIMES DAILY 270 Tab 3    amLODIPine (NORVASC) 10 mg tablet Take 1 Tab by mouth daily. 80 Tab 3     Social History     Socioeconomic History    Marital status:      Spouse name: Not on file    Number of children: Not on file    Years of education: Not on file    Highest education level: Not on file   Occupational History    Occupation:      Employer: [employer]   Tobacco Use    Smoking status: Never Smoker    Smokeless tobacco: Never Used   Substance and Sexual Activity    Alcohol use: No    Drug use: No    Sexual activity: Yes     Partners: Female     Family History   Problem Relation Age of Onset    Cancer Maternal Uncle         prostate    Stroke Maternal Uncle     Stroke Maternal Grandmother        Review of Systems:  GEN: no weight loss, weight gain, fatigue or night sweats  CV: no PND, orthopnea, or palpitations  Resp: no dyspnea on exertion, no cough  Abd: no nausea, vomiting or diarrhea  EXT: denies edema, claudication  Endocrine: no hair loss, excessive thirst or polyuria  Neurological ROS: no TIA or stroke symptoms  ROS otherwise negative      Objective:     Visit Vitals  /72 (BP 1 Location: Right arm, BP Patient Position: Sitting)   Pulse 63   Temp 97.9 °F (36.6 °C) (Oral)   Resp 16   Ht 5' 7\" (1.702 m)   Wt 169 lb 3.2 oz (76.7 kg)   SpO2 94%   BMI 26.50 kg/m²     Body mass index is 26.5 kg/m². General:   alert, cooperative and no distress   Eyes: conjunctivae/sclerae clear. PERRL, EOM's intact   Mouth:  No oral lesions, no pharyngeal erythema, no exudates   Neck: Trachea midline, no thyromegaly, no bruits   Heart: S1 and S2 normal,no murmurs noted    Lungs: Clear to auscultation bilaterally, no increased work of breathing   Abdomen: Soft, nontender.   Normal bowel sounds   Extremities: No edema or cyanosis   Neuro: ..alert, oriented x3,speech normal in context and clarity, cranial nerves II-XII intact,motor strength: full proximally and distally,gait: normal  reflexes: full and symmetric     Physical exam otherwise negative         Assessment/Plan:     Diagnoses and all orders for this visit:    Essential hypertension    Pure hypercholesterolemia    Prostate cancer ([facility] [address]    Mantle cell lymphoma, unspecified body region [facility])        Other instructions: The patient's medications were reviewed and reconciled. No change in his current medical regimen is made. Body mass index is 26.5 and dietary counseling along with printed patient education is given    Labs from his employer were reviewed with the patient today    Follow-up in 6 months    Follow-up and Dispositions    · Return in about 6 months (around 4/3/2020).          Aneta Mathis MD

## 2019-10-03 NOTE — PROGRESS NOTES
Yuri Yanes is a 75 y.o. male presenting for Hypertension (6 mo fu)  . 1. Have you been to the ER, urgent care clinic since your last visit? Hospitalized since your last visit? No    2. Have you seen or consulted any other health care providers outside of the [address] [place] [address] since your last visit? Include any pap smears or colon screening. Dr Chad Noel, last 12 mths 3/28/2019   Able to walk? Yes   Fall in past 12 months? No         Abuse Screening Questionnaire 3/28/2019   Do you ever feel afraid of your partner? N   Are you in a relationship with someone who physically or mentally threatens you? N   Is it safe for you to go home? Y       3 most recent PHQ Screens 3/28/2019   Little interest or pleasure in doing things Not at all   Feeling down, depressed, irritable, or hopeless Not at all   Total Score PHQ 2 0       There are no discontinued medications.

## 2019-11-12 DIAGNOSIS — I10 ESSENTIAL HYPERTENSION: Chronic | ICD-10-CM

## 2019-11-13 RX ORDER — AMLODIPINE BESYLATE 10 MG/1
TABLET ORAL
Qty: 90 TAB | Refills: 3 | Status: SHIPPED | OUTPATIENT
Start: 2019-11-13 | End: 2020-12-27

## 2020-02-11 DIAGNOSIS — I10 ESSENTIAL HYPERTENSION: Chronic | ICD-10-CM

## 2020-02-11 RX ORDER — PROPRANOLOL HYDROCHLORIDE 40 MG/1
TABLET ORAL
Qty: 180 TAB | Refills: 0 | Status: SHIPPED | OUTPATIENT
Start: 2020-02-11 | End: 2020-05-17

## 2020-05-16 DIAGNOSIS — I10 ESSENTIAL HYPERTENSION: Chronic | ICD-10-CM

## 2020-05-17 RX ORDER — POTASSIUM CHLORIDE 20 MEQ/1
TABLET, EXTENDED RELEASE ORAL
Qty: 270 TAB | Refills: 0 | Status: SHIPPED | OUTPATIENT
Start: 2020-05-17 | End: 2020-12-27

## 2020-05-17 RX ORDER — PROPRANOLOL HYDROCHLORIDE 40 MG/1
TABLET ORAL
Qty: 180 TAB | Refills: 0 | Status: SHIPPED | OUTPATIENT
Start: 2020-05-17 | End: 2020-12-27

## 2020-07-01 ENCOUNTER — OFFICE VISIT (OUTPATIENT)
Dept: INTERNAL MEDICINE CLINIC | Age: 65
End: 2020-07-01

## 2020-07-01 VITALS
OXYGEN SATURATION: 98 % | DIASTOLIC BLOOD PRESSURE: 72 MMHG | BODY MASS INDEX: 25.83 KG/M2 | HEART RATE: 72 BPM | HEIGHT: 67 IN | RESPIRATION RATE: 16 BRPM | WEIGHT: 164.6 LBS | SYSTOLIC BLOOD PRESSURE: 122 MMHG | TEMPERATURE: 97.8 F

## 2020-07-01 DIAGNOSIS — E78.00 PURE HYPERCHOLESTEROLEMIA: Chronic | ICD-10-CM

## 2020-07-01 DIAGNOSIS — C61 PROSTATE CANCER (HCC): ICD-10-CM

## 2020-07-01 DIAGNOSIS — Z00.00 MEDICARE ANNUAL WELLNESS VISIT, SUBSEQUENT: Primary | ICD-10-CM

## 2020-07-01 DIAGNOSIS — C83.10 MANTLE CELL LYMPHOMA, UNSPECIFIED BODY REGION (HCC): ICD-10-CM

## 2020-07-01 DIAGNOSIS — I10 ESSENTIAL HYPERTENSION: Chronic | ICD-10-CM

## 2020-07-01 LAB
A-G RATIO,AGRAT: 1 RATIO
ALBUMIN SERPL-MCNC: 4.2 G/DL (ref 3.9–5.4)
ALP SERPL-CCNC: 122 U/L (ref 38–126)
ALT SERPL-CCNC: 17 U/L (ref 0–50)
ANION GAP SERPL CALC-SCNC: 9 MMOL/L
AST SERPL W P-5'-P-CCNC: 21 U/L (ref 14–36)
BILIRUB SERPL-MCNC: 0.6 MG/DL (ref 0.2–1.3)
BILIRUB UR QL: NEGATIVE
BUN SERPL-MCNC: 17 MG/DL (ref 9–20)
BUN/CREATININE RATIO,BUCR: 14 RATIO
CALCIUM SERPL-MCNC: 9.3 MG/DL (ref 8.4–10.2)
CHLORIDE SERPL-SCNC: 105 MMOL/L (ref 98–107)
CHOL/HDL RATIO,CHHD: 4 RATIO (ref 0–4)
CHOLEST SERPL-MCNC: 185 MG/DL (ref 0–200)
CLARITY: CLEAR
CO2 SERPL-SCNC: 29 MMOL/L (ref 22–32)
COLOR UR: ABNORMAL
CREAT SERPL-MCNC: 1.2 MG/DL (ref 0.8–1.5)
ERYTHROCYTE [DISTWIDTH] IN BLOOD BY AUTOMATED COUNT: 13.1 %
GLOBULIN,GLOB: 4.1
GLUCOSE 24H UR-MRATE: ABNORMAL G/(24.H)
GLUCOSE SERPL-MCNC: 173 MG/DL (ref 75–110)
HCT VFR BLD AUTO: 42.7 % (ref 37–51)
HDLC SERPL-MCNC: 42 MG/DL (ref 35–130)
HGB BLD-MCNC: 14.1 G/DL (ref 12–18)
HGB UR QL STRIP: NEGATIVE
KETONES UR QL STRIP.AUTO: NEGATIVE
LDL/HDL RATIO,LDHD: 3 RATIO
LDLC SERPL CALC-MCNC: 126 MG/DL (ref 0–130)
LEUKOCYTE ESTERASE: NEGATIVE
LYMPHOCYTES ABSOLUTE: 1.1 K/UL (ref 0.6–4.1)
LYMPHOCYTES NFR BLD: 22.9 % (ref 10–58.5)
MCH RBC QN AUTO: 28.7 PG (ref 26–32)
MCHC RBC AUTO-ENTMCNC: 33 G/DL (ref 30–36)
MCV RBC AUTO: 86.7 FL (ref 80–97)
MONOCYTES ABS-DIF,2141: 0.4 K/UL (ref 0–1.8)
MONOCYTES NFR BLD: 7.7 % (ref 0.1–24)
NEUTROPHILS # BLD: 69.4 % (ref 37–92)
NEUTROPHILS ABS,2156: 3.4 K/UL (ref 2–7.8)
NITRITE UR QL STRIP.AUTO: NEGATIVE
PH UR STRIP: 5 [PH] (ref 5–7)
PLATELET # BLD AUTO: 285 K/UL (ref 140–440)
POTASSIUM SERPL-SCNC: 3.9 MMOL/L (ref 3.6–5)
PROT SERPL-MCNC: 8.3 G/DL (ref 6.3–8.2)
PROT UR STRIP-MCNC: ABNORMAL MG/DL
RBC # BLD AUTO: 4.92 M/UL (ref 4.2–6.3)
RBC #/AREA URNS HPF: 0 #/HPF
SODIUM SERPL-SCNC: 143 MMOL/L (ref 137–145)
SP GR UR REFRACTOMETRY: 1.02 (ref 1–1.03)
TRIGL SERPL-MCNC: 86 MG/DL (ref 0–200)
TSH SERPL DL<=0.05 MIU/L-ACNC: 1.23 UIU/ML (ref 0.34–5.6)
UROBILINOGEN UR QL STRIP.AUTO: ABNORMAL
VLDLC SERPL CALC-MCNC: 17 MG/DL
WBC # BLD AUTO: 4.9 K/UL (ref 4.1–10.9)
WBC URNS QL MICRO: 0 #/HPF

## 2020-07-01 NOTE — PROGRESS NOTES
This note will not be viewable in [address] Haven Tavares is a 77 y.o. male and presents with Follow Up Chronic Condition (6 mo fu) and Annual Wellness Visit  . Subjective:  Mr. Laura Kaur is a 55-year-old male who presents to the office today for Medicare wellness check and follow-up of multiple medical problems. The patient has hypertension and is currently on Inderal and amlodipine. He tolerates this regimen without fatigue, palpitations, lower extremity edema or orthostatic hypotension. Denies headaches, numbness, tingling or focal neurological problems. He does take a potassium supplement. He has hypercholesterolemia but is not on any specific type of therapy. The patient has no history of cardiac disease and remains physically active. He denies any exertional chest pains or claudication. The patient has a history of prostate cancer for which she has had a [address] prostatectomy done by [facility]. He is due to see his urologist, Dr. Marshall Nolen, at the end of July. He has been suffering from some sexual dysfunction which he plans to talk with Dr. Marshall Nolen about. He has a history of mantle cell lymphoma and is followed by Dr. Lenora Camargo for this. He was seen approximately a month ago and told that he was doing well. He is seen on an every 6-month basis. He denies any fevers, night sweats, unexplained weight loss.     Past Medical History:   Diagnosis Date    Benign non-nodular prostatic hyperplasia without lower urinary tract symptoms 8/21/2017    Cancer [facility])     prostate - surgery/lymphoma    Cellulitis of neck 8/21/2017    Chronic rhinitis 8/21/2017    Colon polyp 8/21/2017    ED (erectile dysfunction) of organic origin 8/21/2017    GERD (gastroesophageal reflux disease)     HNP (herniated nucleus pulposus) 8/21/2017    HTN (hypertension) 9/1/2012    Hyperlipidemia 8/21/2017    Hypertension     Hypokalemia 8/21/2017    Mantle cell lymphoma ([facility] [address] 8/21/2017    Narcolepsy 8/21/2017    Other ill-defined conditions(799.89)     hand laceration sutured    Prostate cancer ([facility] [address] 8/21/2017     Past Surgical History:   Procedure Laterality Date    COLONOSCOPY N/A 7/3/2019    COL[facility]h [facility] at [facility] ENDOSCOPY    HX OTHER SURGICAL      surgery for lymphoma - abdomen    HX PROSTATECTOMY       Allergies   Allergen Reactions    Lisinopril Cough     Current Outpatient Medications   Medication Sig Dispense Refill    potassium chloride (K-DUR, KLOR-CON) 20 mEq tablet TAKE 1 TABLET BY MOUTH THREE TIMES DAILY 270 Tab 0    propranoloL (INDERAL) 40 mg tablet Take 1 tablet by mouth twice daily 180 Tab 0    amLODIPine (NORVASC) 10 mg tablet TAKE 1 TABLET BY MOUTH ONCE DAILY 90 Tab 3     Social History     Socioeconomic History    Marital status:      Spouse name: Not on file    Number of children: Not on file    Years of education: Not on file    Highest education level: Not on file   Occupational History    Occupation: [employer]     Employer: [employer]   Tobacco Use    Smoking status: Never Smoker    Smokeless tobacco: Never Used   Substance and Sexual Activity    Alcohol use: No    Drug use: No    Sexual activity: Yes     Partners: Female     Family History   Problem Relation Age of Onset    Cancer Maternal Uncle         prostate    Stroke Maternal Uncle     Stroke Maternal Grandmother        Health Maintenance   Topic Date Due    DTaP/Tdap/Td series (1 - Tdap) 02/19/1974    Shingrix Vaccine Age 50> (1 of 2) 02/19/2003    GLAUCOMA SCREENING Q2Y  02/19/2018    Pneumococcal 65+ years (2 of 2 - PPSV23) 09/26/2019    Medicare Yearly Exam  03/28/2020    Influenza Age 9 to Adult  08/01/2020    Lipid Screen  09/26/2023    Colonoscopy  07/03/2024    Hepatitis C Screening  Completed        Review of Systems  Constitutional: negative for fevers, chills, anorexia and weight loss  Eyes:   negative for visual disturbance and irritation  ENT: negative for tinnitus,sore throat,nasal congestion,ear pain,hoarseness  Respiratory:  negative for cough, hemoptysis, dyspnea,wheezing  CV:   negative for chest pain, palpitations, lower extremity edema  GI:   negative for nausea, vomiting, diarrhea, abdominal pain,melena  Endo:               negative for polyuria,polydipsia,polyphagia,heat intolerance  Genitourinary: Positive for sexual dysfunction subsequent to prostatectomy  Integumentary: negative for rash and pruritus  Hematologic:  negative for easy bruising and gum/nose bleeding  Musculoskel: negative for myalgias, arthralgias, back pain, muscle weakness, joint pain  Neurological:  negative for headaches, dizziness, vertigo, memory problems and gait   Behavl/Psych: negative for feelings of anxiety, depression, mood changes  ROS otherwise negative      Objective:  Visit Vitals  /72 (BP 1 Location: Left arm, BP Patient Position: Sitting)   Pulse 72   Temp 97.8 °F (36.6 °C) (Oral)   Resp 16   Ht 5' 7\" (1.702 m)   Wt 164 lb 9.6 oz (74.7 kg)   SpO2 98%   BMI 25.78 kg/m²     Body mass index is 25.78 kg/m². Physical Exam:   General appearance - alert, well appearing, and in no distress  Mental status - alert, oriented to person, place, and time  EYE-SAI, EOMI,conjunctiva normal bilaterally, lids normal  ENT-ENT exam normal, no neck nodes or sinus tenderness  Nose - normal and patent, no erythema,  Or discharge   Mouth - mucous membranes moist, pharynx normal without lesions  Neck - supple, no significant adenopathy or bruit  Chest - clear to auscultation, no wheezes, rales or rhonchi. Heart - normal rate, regular rhythm, normal S1, S2, no murmurs, rubs, clicks or gallops   Abdomen - soft, nontender, nondistended, no masses or organomegaly  Lymph- no adenopathy palpable  Ext-peripheral pulses normal, no pedal edema, no clubbing or cyanosis  Skin-Warm and dry.  no hyperpigmentation, vitiligo, or suspicious lesions  Neuro -alert, oriented, normal speech, no focal findings or movement disorder noted    In addition this patient is seen for AWV  as detailed below: This is a Subsequent Medicare Annual Wellness Exam (AWV) (Performed 12 months after IPPE or effective date of Medicare Part B enrollment)    I have reviewed the patient's medical history in detail and updated the computerized patient record. Problem list reviewed with patient and risk factors discussed. PSH, SH, FH, Medications and HM issues also reviewed and discussed. Depression screen, fall risk assessment, functional abilities and ACP also reviewed and discussed as above and below. Depression Risk Factor Screening:     3 most recent PHQ Screens 7/1/2020   Little interest or pleasure in doing things Not at all   Feeling down, depressed, irritable, or hopeless Not at all   Total Score PHQ 2 0     Alcohol Risk Factor Screening: You do not drink alcohol or very rarely. Functional Ability and Level of Safety:   Hearing Loss  Hearing is good. Activities of Daily Living  The home contains: no safety equipment. Patient does total self care    Fall Risk  Fall Risk Assessment, last 12 mths 7/1/2020   Able to walk? Yes   Fall in past 12 months? No       Abuse Screen  Patient is not abused    Cognitive Screening   Evaluation of Cognitive Function:  Has your family/caregiver stated any concerns about your memory: no  Normal    Patient Care Team   Patient Care Team:  Lena Joshua MD as PCP - General (Internal Medicine)  Lena Joshua MD as PCP - REHABILITATION [facility]  Sheba Trotter MD (Urology)  Addie Banks MD (Hematology and Oncology)    Assessment/Plan   Education and counseling provided:  Are appropriate based on today's review and evaluation    Assessment/Plan:   Impressions:      ICD-10-CM ICD-9-CM    1. Medicare annual wellness visit, subsequent Z00.00 V70.0    2.  Essential hypertension I10 401.9 COLLECTION VENOUS BLOOD,VENIPUNCTURE      CBC WITH AUTOMATED DIFF METABOLIC PANEL, COMPREHENSIVE      URINALYSIS W/MICROSCOPIC   3. Pure hypercholesterolemia E78.00 272.0 LIPID PANEL      TSH 3RD GENERATION   4. Prostate cancer ([facility] [address] C61 185    5. Mantle cell lymphoma, unspecified body region ([facility] [address] C83.10 200.40         Plan:  1. Continue present meds  2. Lifestyle modifications including Na restriction, low carb/fat diet, weight reduction and exercise (at least a walking program). Follow-up and Dispositions    · Return in about 6 months (around 1/1/2021). Orders Placed This Encounter    CBC WITH AUTOMATED DIFF ([facility])    LIPID PANEL ([facility])    METABOLIC PANEL, COMPREHENSIVE ([facility])    TSH 3RD GENERATION ([facility])    URINALYSIS W/MICROSCOPIC ([facility])    COLLECTION VENOUS BLOOD,VENIPUNCTURE       Lolita Christine MD   Assessment/Plan:  Diagnoses and all orders for this visit:    Medicare annual wellness visit, subsequent    Essential hypertension  -     COLLECTION VENOUS BLOOD,VENIPUNCTURE  -     CBC WITH AUTOMATED DIFF  -     METABOLIC PANEL, COMPREHENSIVE  -     URINALYSIS W/MICROSCOPIC    Pure hypercholesterolemia  -     LIPID PANEL  -     TSH 3RD GENERATION    [facility]    Mantle cell lymphoma, unspecified body region [facility])        Health Maintenance Due   Topic Date Due    DTaP/Tdap/Td series (1 - Tdap) 02/19/1974    Shingrix Vaccine Age 50> (1 of 2) 02/19/2003    GLAUCOMA SCREENING Q2Y  02/19/2018    Pneumococcal 65+ years (2 of 2 - PPSV23) 09/26/2019    Medicare Yearly Exam  03/28/2020       Other instructions:   Patient's medications were reviewed and reconciled. No change in his current medical regimen will be made. A no added salt, prudent diet is encouraged. Continued follow-up by medical oncology as well as urology in regards to the previously noted cancer issues.     Health maintenance issues were reviewed and he is due for glaucoma screening which she will have scheduled in the near future and I have asked for a copy of this report to be forwarded to us. Age-appropriate vaccinations were reviewed and he will be due for Pneumovax 23 when he receives his flu shot this fall. I have also recommended Tdap and shingles vaccines to be obtained as an outpatient in his pharmacy. Await results of multiple labs    Follow-up in 6 months    Follow-up and Dispositions    · Return in about 6 months (around 1/1/2021). I have reviewed with the patient details of the assessment and plan and all questions were answered. Relevent patient education was performed. The most recent lab findings were reviewed with the patient. An After Visit Summary was printed and given to the patient. Kip Schaffer MD    Please note that this dictation was completed with [employer], the computer voice recognition software. Quite often unanticipated grammatical, syntax, homophones, and other interpretive errors are inadvertently transcribed by the computer software. Please disregard these errors. Please excuse any errors that have escaped final proofreading.

## 2020-07-01 NOTE — PROGRESS NOTES
Chief Complaint   Patient presents with    Follow Up Chronic Condition     6 mo fu    Annual Wellness Visit       Depression Risk Factor Screening:     3 most recent PHQ Screens 7/1/2020   Little interest or pleasure in doing things Not at all   Feeling down, depressed, irritable, or hopeless Not at all   Total Score PHQ 2 0       Functional Ability and Level of Safety:     Activities of Daily Living  ADL Assessment 7/1/2020   Feeding yourself No Help Needed   Getting from bed to chair No Help Needed   Getting dressed No Help Needed   Bathing or showering No Help Needed   Walk across the room (includes cane/walker) No Help Needed   Using the telphone No Help Needed   Taking your medications No Help Needed   Preparing meals No Help Needed   Managing money (expenses/bills) No Help Needed   Moderately strenuous housework (laundry) No Help Needed   Shopping for personal items (toiletries/medicines) No Help Needed   Shopping for groceries No Help Needed   Driving No Help Needed   Climbing a flight of stairs No Help Needed   Getting to places beyond walking distances No Help Needed       Fall Risk  Fall Risk Assessment, last 12 mths 7/1/2020   Able to walk? Yes   Fall in past 12 months? No       Abuse Screen  Abuse Screening Questionnaire 7/1/2020   Do you ever feel afraid of your partner? N   Are you in a relationship with someone who physically or mentally threatens you? N   Is it safe for you to go home?  Y         Patient Care Team   Patient Care Team:  Nurys Almendarez MD as PCP - General (Internal Medicine)  Nurys Almendarez MD as PCP - REHABILITATION [facility] Empaneled Provider  Sharon Nolasco MD (Urology)  Mtichell Pike MD (Hematology and Oncology)

## 2020-07-01 NOTE — PATIENT INSTRUCTIONS
The best way to stay healthy is to live a healthy lifestyle. A healthy lifestyle includes regular exercise, eating a well-balanced diet, keeping a healthy weight and not smoking. Regular physical exams and screening tests are another important way to take care of yourself. Preventive exams provided by health care providers can find health problems early when treatment works best and can keep you from getting certain diseases or illnesses. Preventive services include exams, lab tests, screenings, shots, monitoring and information to help you take care of your own health. All people over 65 should have a pneumonia shot. Pneumonia shots are usually only needed once in a lifetime unless your doctor decides differently. In addition to your physical exam, some screening tests are recommended:    All people over 65 should have a yearly flu shot. People over 65 are at medium to high risk for Hepatitis B. Three shots are needed for complete protection. Bone mass measurement (dexa scan) is recommended every two years. Diabetes Mellitus screening is recommended every year. Glaucoma is an eye disease caused by high pressure in the eye. An eye exam is recommended every year. Cardiovascular screening tests that check your cholesterol and other blood fat (lipid) levels are recommended every five years. Colorectal Cancer screening tests help to find pre-cancerous polyps (growths in the colon) so they can be removed before they turn into cancer. Tests ordered for screening depend on your personal and family history risk factors. Prostate Cancer Screening (annually up to age 76)    Screening for breast cancer is recommended yearly with a Mammogram.    Screening for cervical and vaginal cancer is recommended with a pelvic and Pap test every two years.  However if you have had an abnormal pap in the past  three years or at high risk for cervical or vaginal cancer Medicare will cover a pap test and a pelvic exam every year. Here is a list of your current Health Maintenance items with a due date:  Health Maintenance Due   Topic Date Due    DTaP/Tdap/Td  (1 - Tdap) 02/19/1974    Shingles Vaccine (1 of 2) 02/19/2003    Colon Cancer Stool Test  02/19/2003    Glaucoma Screening   02/19/2018    Pneumococcal Vaccine (2 of 2 - PPSV23) 09/26/2019    Annual Well Visit  03/28/2020          DASH Diet: Care Instructions  Your Care Instructions     The DASH diet is an eating plan that can help lower your blood pressure. DASH stands for Dietary Approaches to Stop Hypertension. Hypertension is high blood pressure. The DASH diet focuses on eating foods that are high in calcium, potassium, and magnesium. These nutrients can lower blood pressure. The foods that are highest in these nutrients are fruits, vegetables, low-fat dairy products, nuts, seeds, and legumes. But taking calcium, potassium, and magnesium supplements instead of eating foods that are high in those nutrients does not have the same effect. The DASH diet also includes whole grains, fish, and poultry. The DASH diet is one of several lifestyle changes your doctor may recommend to lower your high blood pressure. Your doctor may also want you to decrease the amount of sodium in your diet. Lowering sodium while following the DASH diet can lower blood pressure even further than just the DASH diet alone. Follow-up care is a key part of your treatment and safety. Be sure to make and go to all appointments, and call your doctor if you are having problems. It's also a good idea to know your test results and keep a list of the medicines you take. How can you care for yourself at home? Following the DASH diet  · Eat 4 to 5 servings of fruit each day. A serving is 1 medium-sized piece of fruit, ½ cup chopped or canned fruit, 1/4 cup dried fruit, or 4 ounces (½ cup) of fruit juice. Choose fruit more often than fruit juice.   · Eat 4 to 5 servings of vegetables each day. A serving is 1 cup of lettuce or raw leafy vegetables, ½ cup of chopped or cooked vegetables, or 4 ounces (½ cup) of vegetable juice. Choose vegetables more often than vegetable juice. · Get 2 to 3 servings of low-fat and fat-free dairy each day. A serving is 8 ounces of milk, 1 cup of yogurt, or 1 ½ ounces of cheese. · Eat 6 to 8 servings of grains each day. A serving is 1 slice of bread, 1 ounce of dry cereal, or ½ cup of cooked rice, pasta, or cooked cereal. Try to choose whole-grain products as much as possible. · Limit lean meat, poultry, and fish to 2 servings each day. A serving is 3 ounces, about the size of a deck of cards. · Eat 4 to 5 servings of nuts, seeds, and legumes (cooked dried beans, lentils, and split peas) each week. A serving is 1/3 cup of nuts, 2 tablespoons of seeds, or ½ cup of cooked beans or peas. · Limit fats and oils to 2 to 3 servings each day. A serving is 1 teaspoon of vegetable oil or 2 tablespoons of salad dressing. · Limit sweets and added sugars to 5 servings or less a week. A serving is 1 tablespoon jelly or jam, ½ cup sorbet, or 1 cup of lemonade. · Eat less than 2,300 milligrams (mg) of sodium a day. If you limit your sodium to 1,500 mg a day, you can lower your blood pressure even more. Tips for success  · Start small. Do not try to make dramatic changes to your diet all at once. You might feel that you are missing out on your favorite foods and then be more likely to not follow the plan. Make small changes, and stick with them. Once those changes become habit, add a few more changes. · Try some of the following:  ? Make it a goal to eat a fruit or vegetable at every meal and at snacks. This will make it easy to get the recommended amount of fruits and vegetables each day. ? Try yogurt topped with fruit and nuts for a snack or healthy dessert. ? Add lettuce, tomato, cucumber, and onion to sandwiches.   ? Combine a ready-made pizza crust with low-fat mozzarella cheese and lots of vegetable toppings. Try using tomatoes, squash, spinach, broccoli, carrots, cauliflower, and onions. ? Have a variety of cut-up vegetables with a low-fat dip as an appetizer instead of chips and dip. ? Sprinkle sunflower seeds or chopped almonds over salads. Or try adding chopped walnuts or almonds to cooked vegetables. ? Try some vegetarian meals using beans and peas. Add garbanzo or kidney beans to salads. Make burritos and tacos with mashed cruz beans or black beans. Where can you learn more? Go to http://fredrick-ankita.info/  Enter H967 in the search box to learn more about \"DASH Diet: Care Instructions. \"  Current as of: December 16, 2019               Content Version: 12.5  © 9234-8255 [employer]. Care instructions adapted under license by [employer] (which disclaims liability or warranty for this information). If you have questions about a medical condition or this instruction, always ask your healthcare professional. [place] [address] any warranty or liability for your use of this information.

## 2020-12-26 DIAGNOSIS — I10 ESSENTIAL HYPERTENSION: Chronic | ICD-10-CM

## 2020-12-27 RX ORDER — PROPRANOLOL HYDROCHLORIDE 40 MG/1
TABLET ORAL
Qty: 180 TAB | Refills: 0 | Status: SHIPPED | OUTPATIENT
Start: 2020-12-27 | End: 2021-04-20

## 2020-12-27 RX ORDER — AMLODIPINE BESYLATE 10 MG/1
TABLET ORAL
Qty: 90 TAB | Refills: 0 | Status: SHIPPED | OUTPATIENT
Start: 2020-12-27 | End: 2021-04-20

## 2020-12-27 RX ORDER — POTASSIUM CHLORIDE 20 MEQ/1
TABLET, EXTENDED RELEASE ORAL
Qty: 270 TAB | Refills: 0 | Status: SHIPPED | OUTPATIENT
Start: 2020-12-27 | End: 2021-07-21

## 2021-02-12 ENCOUNTER — OFFICE VISIT (OUTPATIENT)
Dept: INTERNAL MEDICINE CLINIC | Age: 66
End: 2021-02-12
Payer: COMMERCIAL

## 2021-02-12 VITALS
TEMPERATURE: 97.7 F | RESPIRATION RATE: 18 BRPM | HEIGHT: 67 IN | HEART RATE: 60 BPM | DIASTOLIC BLOOD PRESSURE: 78 MMHG | SYSTOLIC BLOOD PRESSURE: 138 MMHG | WEIGHT: 169 LBS | BODY MASS INDEX: 26.53 KG/M2 | OXYGEN SATURATION: 97 %

## 2021-02-12 DIAGNOSIS — I10 ESSENTIAL HYPERTENSION: Primary | Chronic | ICD-10-CM

## 2021-02-12 DIAGNOSIS — E78.00 PURE HYPERCHOLESTEROLEMIA: Chronic | ICD-10-CM

## 2021-02-12 DIAGNOSIS — N52.9 ED (ERECTILE DYSFUNCTION) OF ORGANIC ORIGIN: ICD-10-CM

## 2021-02-12 DIAGNOSIS — C83.10 MANTLE CELL LYMPHOMA, UNSPECIFIED BODY REGION (HCC): ICD-10-CM

## 2021-02-12 DIAGNOSIS — C61 PROSTATE CANCER (HCC): ICD-10-CM

## 2021-02-12 PROCEDURE — G8427 DOCREV CUR MEDS BY ELIG CLIN: HCPCS | Performed by: INTERNAL MEDICINE

## 2021-02-12 PROCEDURE — G8510 SCR DEP NEG, NO PLAN REQD: HCPCS | Performed by: INTERNAL MEDICINE

## 2021-02-12 PROCEDURE — G8754 DIAS BP LESS 90: HCPCS | Performed by: INTERNAL MEDICINE

## 2021-02-12 PROCEDURE — G8419 CALC BMI OUT NRM PARAM NOF/U: HCPCS | Performed by: INTERNAL MEDICINE

## 2021-02-12 PROCEDURE — G8752 SYS BP LESS 140: HCPCS | Performed by: INTERNAL MEDICINE

## 2021-02-12 PROCEDURE — 1101F PT FALLS ASSESS-DOCD LE1/YR: CPT | Performed by: INTERNAL MEDICINE

## 2021-02-12 PROCEDURE — 99214 OFFICE O/P EST MOD 30 MIN: CPT | Performed by: INTERNAL MEDICINE

## 2021-02-12 PROCEDURE — G8536 NO DOC ELDER MAL SCRN: HCPCS | Performed by: INTERNAL MEDICINE

## 2021-02-12 PROCEDURE — 3017F COLORECTAL CA SCREEN DOC REV: CPT | Performed by: INTERNAL MEDICINE

## 2021-02-12 RX ORDER — SILDENAFIL 50 MG/1
50 TABLET, FILM COATED ORAL AS NEEDED
Qty: 30 TAB | Refills: 2 | Status: SHIPPED | OUTPATIENT
Start: 2021-02-12 | End: 2022-02-21 | Stop reason: ALTCHOICE

## 2021-02-12 NOTE — PATIENT INSTRUCTIONS
DASH Diet: Care Instructions  Your Care Instructions     The DASH diet is an eating plan that can help lower your blood pressure. DASH stands for Dietary Approaches to Stop Hypertension. Hypertension is high blood pressure. The DASH diet focuses on eating foods that are high in calcium, potassium, and magnesium. These nutrients can lower blood pressure. The foods that are highest in these nutrients are fruits, vegetables, low-fat dairy products, nuts, seeds, and legumes. But taking calcium, potassium, and magnesium supplements instead of eating foods that are high in those nutrients does not have the same effect. The DASH diet also includes whole grains, fish, and poultry. The DASH diet is one of several lifestyle changes your doctor may recommend to lower your high blood pressure. Your doctor may also want you to decrease the amount of sodium in your diet. Lowering sodium while following the DASH diet can lower blood pressure even further than just the DASH diet alone. Follow-up care is a key part of your treatment and safety. Be sure to make and go to all appointments, and call your doctor if you are having problems. It's also a good idea to know your test results and keep a list of the medicines you take. How can you care for yourself at home? Following the DASH diet  · Eat 4 to 5 servings of fruit each day. A serving is 1 medium-sized piece of fruit, ½ cup chopped or canned fruit, 1/4 cup dried fruit, or 4 ounces (½ cup) of fruit juice. Choose fruit more often than fruit juice. · Eat 4 to 5 servings of vegetables each day. A serving is 1 cup of lettuce or raw leafy vegetables, ½ cup of chopped or cooked vegetables, or 4 ounces (½ cup) of vegetable juice. Choose vegetables more often than vegetable juice. · Get 2 to 3 servings of low-fat and fat-free dairy each day. A serving is 8 ounces of milk, 1 cup of yogurt, or 1 ½ ounces of cheese. · Eat 6 to 8 servings of grains each day.  A serving is 1 slice of bread, 1 ounce of dry cereal, or ½ cup of cooked rice, pasta, or cooked cereal. Try to choose whole-grain products as much as possible. · Limit lean meat, poultry, and fish to 2 servings each day. A serving is 3 ounces, about the size of a deck of cards. · Eat 4 to 5 servings of nuts, seeds, and legumes (cooked dried beans, lentils, and split peas) each week. A serving is 1/3 cup of nuts, 2 tablespoons of seeds, or ½ cup of cooked beans or peas. · Limit fats and oils to 2 to 3 servings each day. A serving is 1 teaspoon of vegetable oil or 2 tablespoons of salad dressing. · Limit sweets and added sugars to 5 servings or less a week. A serving is 1 tablespoon jelly or jam, ½ cup sorbet, or 1 cup of lemonade. · Eat less than 2,300 milligrams (mg) of sodium a day. If you limit your sodium to 1,500 mg a day, you can lower your blood pressure even more. Tips for success  · Start small. Do not try to make dramatic changes to your diet all at once. You might feel that you are missing out on your favorite foods and then be more likely to not follow the plan. Make small changes, and stick with them. Once those changes become habit, add a few more changes. · Try some of the following:  ? Make it a goal to eat a fruit or vegetable at every meal and at snacks. This will make it easy to get the recommended amount of fruits and vegetables each day. ? Try yogurt topped with fruit and nuts for a snack or healthy dessert. ? Add lettuce, tomato, cucumber, and onion to sandwiches. ? Combine a ready-made pizza crust with low-fat mozzarella cheese and lots of vegetable toppings. Try using tomatoes, squash, spinach, broccoli, carrots, cauliflower, and onions. ? Have a variety of cut-up vegetables with a low-fat dip as an appetizer instead of chips and dip. ? Sprinkle sunflower seeds or chopped almonds over salads. Or try adding chopped walnuts or almonds to cooked vegetables.   ? Try some vegetarian meals using beans and peas. Add garbanzo or kidney beans to salads. Make burritos and tacos with mashed cruz beans or black beans. Where can you learn more? Go to http://www.wise.com/  Enter H967 in the search box to learn more about \"DASH Diet: Care Instructions. \"  Current as of: December 16, 2019               Content Version: 12.6  © 8805-2909 [employer]. Care instructions adapted under license by [employer] (which disclaims liability or warranty for this information). If you have questions about a medical condition or this instruction, always ask your healthcare professional. [address] any warranty or liability for your use of this information.

## 2021-02-12 NOTE — PROGRESS NOTES
Reza Dietrich is a 77 y.o. male presenting for Follow Up Chronic Condition (6 mo fu)  . 1. Have you been to the ER, urgent care clinic since your last visit? Hospitalized since your last visit? No    2. Have you seen or consulted any other health care providers outside of the [address] [place] [address] since your last visit? Include any pap smears or colon screening. No    Fall Risk Assessment, last 12 mths 2/12/2021   Able to walk? Yes   Fall in past 12 months? 0   Do you feel unsteady? 0   Are you worried about falling 0         Abuse Screening Questionnaire 2/12/2021   Do you ever feel afraid of your partner? N   Are you in a relationship with someone who physically or mentally threatens you? N   Is it safe for you to go home? Y       3 most recent PHQ Screens 2/12/2021   Little interest or pleasure in doing things Not at all   Feeling down, depressed, irritable, or hopeless Not at all   Total Score PHQ 2 0       There are no discontinued medications.

## 2021-02-12 NOTE — PROGRESS NOTES
Aminah Christensen is a 77 y.o. male and presents with Follow Up Chronic Condition (6 mo fu)  . Subjective:  Mr. Erna Mcginnis returns to the office today in general medical follow-up of multiple medical problems. The patient has hypertension. He remains on amlodipine, propranolol. He does also take a potassium supplement. He denies any orthostatic dizziness, lower extremity edema, fatigue or palpitations. He has had no headaches, numbness, tingling or focal neurological problems. He has hyperlipidemia which she is currently trying to manage with diet. Last lipid profile on 7/1 revealed a total cholesterol of 185 HDL of 42 and LDL of 126. He has no history of vascular disease and denies exertional chest pains or claudication. He has erectile dysfunction for which he has been using sildenafil in the past.  This continues to work effectively for him without side effects and he would like to have a refill today. He has a history of prostate cancer for which she has had a [id number] prostatectomy. He is followed by his urologist Dr. Delmar Ignacio for this. He has had no recurrence to date. The patient also has mantle cell lymphoma followed by Dr. Beba Rod at the [facility]. Patient states that he has a lymph node in his right groin and left axilla which is being monitored. He is due for follow-up with them in 1 week. He denies any night sweats, fevers, weight loss.     Past Medical History:   Diagnosis Date    Benign non-nodular prostatic hyperplasia without lower urinary tract symptoms 8/21/2017    Cancer [facility])     prostate - surgery/lymphoma    Cellulitis of neck 8/21/2017    Chronic rhinitis 8/21/2017    Colon polyp 8/21/2017    ED (erectile dysfunction) of organic origin 8/21/2017    GERD (gastroesophageal reflux disease)     HNP (herniated nucleus pulposus) 8/21/2017    HTN (hypertension) 9/1/2012    Hyperlipidemia 8/21/2017    Hypertension     Hypokalemia 8/21/2017    Mantle cell lymphoma ([facility] [address] 8/21/2017    Narcolepsy 8/21/2017    Other ill-defined conditions(799.89)     hand laceration sutured    Prostate cancer ([facility] [address] 8/21/2017     Past Surgical History:   Procedure Laterality Date    COLONOSCOPY N/A 7/3/2019    COL[facility] performed [facility] at [facility] ENDOSCOPY    HX OTHER SURGICAL      surgery for lymphoma - abdomen    HX PROSTATECTOMY       Allergies   Allergen Reactions    Lisinopril Cough     Current Outpatient Medications   Medication Sig Dispense Refill    sildenafil citrate (VIAGRA) 50 mg tablet Take 1 Tab by mouth as needed for Erectile Dysfunction.  30 Tab 2    amLODIPine (NORVASC) 10 mg tablet Take 1 tablet by mouth once daily 90 Tab 0    propranoloL (INDERAL) 40 mg tablet Take 1 tablet by mouth twice daily 180 Tab 0    potassium chloride (K-DUR, KLOR-CON) 20 mEq tablet TAKE  1 TABLET   BY MOUTH THREE TIMES DAILY 270 Tab 0     Social History     Socioeconomic History    Marital status:      Spouse name: Not on file    Number of children: Not on file    Years of education: Not on file    Highest education level: Not on file   Occupational History    Occupation: Consignd     Employer: [employer]   Tobacco Use    Smoking status: Never Smoker    Smokeless tobacco: Never Used   Substance and Sexual Activity    Alcohol use: No    Drug use: No    Sexual activity: Yes     Partners: Female     Family History   Problem Relation Age of Onset    Cancer Maternal Uncle         prostate    Stroke Maternal Uncle     Stroke Maternal Grandmother        Health Maintenance   Topic Date Due    COVID-19 Vaccine (1 of 2) 02/19/1969    DTaP/Tdap/Td series (1 - Tdap) 02/19/1974    Shingrix Vaccine Age 50> (1 of 2) 02/19/2003    GLAUCOMA SCREENING Q2Y  02/19/2018    Pneumococcal 65+ years (2 of 2 - PPSV23) 09/26/2019    Pneumococcal 65+ yrs at Risk Vaccine (2 of 2 - PPSV23) 09/26/2019    Medicare Yearly Exam  07/02/2021    Colorectal Cancer Screening Combo  07/03/2024    Lipid Screen  07/01/2025    Hepatitis C Screening  Completed    Flu Vaccine  Completed        Review of Systems  Constitutional: negative for fevers, chills, anorexia and weight loss  Eyes:   negative for visual disturbance and irritation  ENT:   negative for tinnitus,sore throat,nasal congestion,ear pain,hoarseness  Respiratory:  negative for cough, hemoptysis, dyspnea,wheezing  CV:   negative for chest pain, palpitations, lower extremity edema  GI:   negative for nausea, vomiting, diarrhea, abdominal pain,melena  Endo:               negative for polyuria,polydipsia,polyphagia,heat intolerance  Genitourinary: Positive for sexual dysfunction  Integumentary: negative for rash and pruritus  Hematologic:  negative for easy bruising and gum/nose bleeding  Musculoskel: negative for myalgias, arthralgias, back pain, muscle weakness, joint pain  Neurological:  negative for headaches, dizziness, vertigo, memory problems and gait   Behavl/Psych: negative for feelings of anxiety, depression, mood changes  ROS otherwise negative      Objective:  Visit Vitals  /78 (BP 1 Location: Left upper arm, BP Patient Position: Sitting, BP Cuff Size: Large adult)   Pulse 60   Temp 97.7 °F (36.5 °C) (Oral)   Resp 18   Ht 5' 7\" (1.702 m)   Wt 169 lb (76.7 kg)   SpO2 97%   BMI 26.47 kg/m²     Body mass index is 26.47 kg/m². Physical Exam:   General appearance - alert, well appearing, and in no distress  Mental status - alert, oriented to person, place, and time  EYE-SAI, EOMI,conjunctiva normal bilaterally, lids normal  ENT-ENT exam normal, no neck nodes or sinus tenderness  Nose - normal and patent, no erythema,  Or discharge   Mouth - mucous membranes moist, pharynx normal without lesions  Neck - supple, no significant adenopathy or bruit  Chest - clear to auscultation, no wheezes, rales or rhonchi.   Heart - normal rate, regular rhythm, normal S1, S2, no murmurs, rubs, clicks or gallops   Abdomen - soft, nontender, nondistended, no masses or organomegaly  Ext-peripheral pulses normal, no pedal edema, no clubbing or cyanosis  Skin-Warm and dry. no hyperpigmentation, vitiligo, or suspicious lesions  Neuro -alert, oriented, normal speech, no focal findings or movement disorder noted      Assessment/Plan:  Diagnoses and all orders for this visit:    Essential hypertension    Pure hypercholesterolemia    Mantle cell lymphoma, unspecified body region [facility])    Prostate cancer [facility])    ED (erectile dysfunction) of organic origin  -     sildenafil citrate (VIAGRA) 50 mg tablet; Take 1 Tab by mouth as needed for Erectile Dysfunction. , Normal, Disp-30 Tab, R-2        Other instructions: The patient's medications were reviewed and reconciled. No change in his current medical regimen will be made. His sildenafil will be refilled today. A no added salt, prudent diet is encouraged    Covid-19 vaccination strongly encouraged    Labs from 7/1 reviewed with the patient today    Continue to follow-up with urology regarding prostate cancer    Continued follow-up with heme-onc in regard to mantle cell lymphoma    Follow-up 6 months    Follow-up and Dispositions    · Return in about 6 months (around 8/12/2021). I have reviewed with the patient details of the assessment and plan and all questions were answered. Relevent patient education was performed. The most recent lab findings were reviewed with the patient. An After Visit Summary was printed and given to the patient. Melissa Rubin MD    Please note that this dictation was completed with icix, the computer voice recognition software. Quite often unanticipated grammatical, syntax, homophones, and other interpretive errors are inadvertently transcribed by the computer software. Please disregard these errors. Please excuse any errors that have escaped final proofreading.

## 2021-03-01 ENCOUNTER — IMMUNIZATION (OUTPATIENT)
Dept: INTERNAL MEDICINE CLINIC | Age: 66
End: 2021-03-01
Payer: MEDICARE

## 2021-03-01 DIAGNOSIS — Z23 ENCOUNTER FOR IMMUNIZATION: Primary | ICD-10-CM

## 2021-03-01 PROCEDURE — 91300 COVID-19, MRNA, LNP-S, PF, 30MCG/0.3ML DOSE(PFIZER): CPT | Performed by: FAMILY MEDICINE

## 2021-03-01 PROCEDURE — 0001A COVID-19, MRNA, LNP-S, PF, 30MCG/0.3ML DOSE(PFIZER): CPT | Performed by: FAMILY MEDICINE

## 2021-03-22 ENCOUNTER — IMMUNIZATION (OUTPATIENT)
Dept: INTERNAL MEDICINE CLINIC | Age: 66
End: 2021-03-22
Payer: MEDICARE

## 2021-03-22 DIAGNOSIS — Z23 ENCOUNTER FOR IMMUNIZATION: Primary | ICD-10-CM

## 2021-03-22 PROCEDURE — 91300 COVID-19, MRNA, LNP-S, PF, 30MCG/0.3ML DOSE(PFIZER): CPT | Performed by: FAMILY MEDICINE

## 2021-03-22 PROCEDURE — 0002A COVID-19, MRNA, LNP-S, PF, 30MCG/0.3ML DOSE(PFIZER): CPT | Performed by: FAMILY MEDICINE

## 2021-04-20 DIAGNOSIS — I10 ESSENTIAL HYPERTENSION: Chronic | ICD-10-CM

## 2021-04-20 RX ORDER — PROPRANOLOL HYDROCHLORIDE 40 MG/1
TABLET ORAL
Qty: 180 TAB | Refills: 0 | Status: SHIPPED | OUTPATIENT
Start: 2021-04-20 | End: 2021-09-22

## 2021-04-20 RX ORDER — AMLODIPINE BESYLATE 10 MG/1
TABLET ORAL
Qty: 90 TAB | Refills: 0 | Status: SHIPPED | OUTPATIENT
Start: 2021-04-20 | End: 2021-07-21

## 2021-05-20 LAB — SARS-COV-2, NAA: NOT DETECTED

## 2021-07-20 DIAGNOSIS — I10 ESSENTIAL HYPERTENSION: Chronic | ICD-10-CM

## 2021-07-21 RX ORDER — POTASSIUM CHLORIDE 20 MEQ/1
TABLET, EXTENDED RELEASE ORAL
Qty: 270 TABLET | Refills: 0 | Status: SHIPPED | OUTPATIENT
Start: 2021-07-21 | End: 2021-07-23

## 2021-07-21 RX ORDER — AMLODIPINE BESYLATE 10 MG/1
TABLET ORAL
Qty: 90 TABLET | Refills: 0 | Status: SHIPPED | OUTPATIENT
Start: 2021-07-21 | End: 2021-07-23

## 2021-07-22 DIAGNOSIS — I10 ESSENTIAL HYPERTENSION: Chronic | ICD-10-CM

## 2021-07-23 RX ORDER — POTASSIUM CHLORIDE 20 MEQ/1
TABLET, EXTENDED RELEASE ORAL
Qty: 270 TABLET | Refills: 0 | Status: SHIPPED | OUTPATIENT
Start: 2021-07-23 | End: 2022-02-28

## 2021-07-23 RX ORDER — AMLODIPINE BESYLATE 10 MG/1
TABLET ORAL
Qty: 90 TABLET | Refills: 0 | Status: SHIPPED | OUTPATIENT
Start: 2021-07-23 | End: 2021-11-03

## 2021-08-17 ENCOUNTER — OFFICE VISIT (OUTPATIENT)
Dept: INTERNAL MEDICINE CLINIC | Age: 66
End: 2021-08-17
Payer: COMMERCIAL

## 2021-08-17 VITALS
HEIGHT: 67 IN | DIASTOLIC BLOOD PRESSURE: 80 MMHG | HEART RATE: 72 BPM | SYSTOLIC BLOOD PRESSURE: 122 MMHG | WEIGHT: 164.2 LBS | TEMPERATURE: 97.9 F | OXYGEN SATURATION: 97 % | BODY MASS INDEX: 25.77 KG/M2 | RESPIRATION RATE: 16 BRPM

## 2021-08-17 DIAGNOSIS — M75.21 TENDONITIS, BICIPITAL, RIGHT: ICD-10-CM

## 2021-08-17 DIAGNOSIS — E78.00 PURE HYPERCHOLESTEROLEMIA: Chronic | ICD-10-CM

## 2021-08-17 DIAGNOSIS — Z00.00 MEDICARE ANNUAL WELLNESS VISIT, SUBSEQUENT: ICD-10-CM

## 2021-08-17 DIAGNOSIS — I10 ESSENTIAL HYPERTENSION: Primary | Chronic | ICD-10-CM

## 2021-08-17 PROCEDURE — G8536 NO DOC ELDER MAL SCRN: HCPCS | Performed by: INTERNAL MEDICINE

## 2021-08-17 PROCEDURE — G8754 DIAS BP LESS 90: HCPCS | Performed by: INTERNAL MEDICINE

## 2021-08-17 PROCEDURE — G8427 DOCREV CUR MEDS BY ELIG CLIN: HCPCS | Performed by: INTERNAL MEDICINE

## 2021-08-17 PROCEDURE — G8432 DEP SCR NOT DOC, RNG: HCPCS | Performed by: INTERNAL MEDICINE

## 2021-08-17 PROCEDURE — G0439 PPPS, SUBSEQ VISIT: HCPCS | Performed by: INTERNAL MEDICINE

## 2021-08-17 PROCEDURE — 99214 OFFICE O/P EST MOD 30 MIN: CPT | Performed by: INTERNAL MEDICINE

## 2021-08-17 PROCEDURE — G8419 CALC BMI OUT NRM PARAM NOF/U: HCPCS | Performed by: INTERNAL MEDICINE

## 2021-08-17 PROCEDURE — G8752 SYS BP LESS 140: HCPCS | Performed by: INTERNAL MEDICINE

## 2021-08-17 PROCEDURE — 3017F COLORECTAL CA SCREEN DOC REV: CPT | Performed by: INTERNAL MEDICINE

## 2021-08-17 PROCEDURE — 1101F PT FALLS ASSESS-DOCD LE1/YR: CPT | Performed by: INTERNAL MEDICINE

## 2021-08-17 RX ORDER — DICLOFENAC SODIUM 75 MG/1
75 TABLET, DELAYED RELEASE ORAL 2 TIMES DAILY
Qty: 60 TABLET | Refills: 0 | Status: SHIPPED | OUTPATIENT
Start: 2021-08-17 | End: 2022-02-21 | Stop reason: ALTCHOICE

## 2021-08-17 NOTE — PROGRESS NOTES
Wil Fernandes is a 74 y.o. male and presents with Follow Up Chronic Condition (6 mo fu) and Annual Wellness Visit  . Subjective:  Mr. Roger Smith presents the office today for Medicare wellness check but in addition also complains of right shoulder pain and is due for follow-up of his hypertension and hypercholesterolemia. The patient complains of right shoulder pain of 2 weeks duration. 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 He notes that the pain has been constant and is localized directly over the anterior aspect of the shoulder. It does throb at night and does keep him awake. He denies any neck pain or radicular discomfort. He notes a limitation in the range of motion of his shoulder. He has had difficulty elevating his arm. He notes no loss of strength and he has had no rash. Patient has hypertension currently managed on amlodipine and propranolol. He tolerates this regimen without fatigue, palpitation, orthostatic dizziness or lower extremity edema. He denies headaches, numbness, tingling or focal neurological problems. Patient also has a hyperlipidemia but he is not currently on any type of medication for this. He gives no history of coronary artery disease and denies any exertional chest pains or claudication.     Past Medical History:   Diagnosis Date    Benign non-nodular prostatic hyperplasia without lower urinary tract symptoms 8/21/2017    Cancer [facility])     prostate - surgery/lymphoma    Cellulitis of neck 8/21/2017    Chronic rhinitis 8/21/2017    Colon polyp 8/21/2017    ED (erectile dysfunction) of organic origin 8/21/2017    GERD (gastroesophageal reflux disease)     HNP (herniated nucleus pulposus) 8/21/2017    HTN (hypertension) 9/1/2012    Hyperlipidemia 8/21/2017    Hypertension     Hypokalemia 8/21/2017    Mantle cell lymphoma ([facility] [address] 8/21/2017    Narcolepsy 8/21/2017  Other ill-defined conditions(799.89)     hand laceration sutured    Prostate cancer ([facility] [address] 8/21/2017     Past Surgical History:   Procedure Laterality Date    COLONOSCOPY N/A 7/3/2019    COLONOSCOPY performed by Mónica Pop MD at [facility] ENDOSCOPY    HX OTHER SURGICAL      surgery for lymphoma - abdomen    HX PROSTATECTOMY       Allergies   Allergen Reactions    Lisinopril Cough     Current Outpatient Medications   Medication Sig Dispense Refill    diclofenac EC (VOLTAREN) 75 mg EC tablet Take 1 Tablet by mouth two (2) times a day. 60 Tablet 0    amLODIPine (NORVASC) 10 mg tablet Take 1 tablet by mouth once daily 90 Tablet 0    potassium chloride (K-DUR, KLOR-CON) 20 mEq tablet TAKE 1 TABLET BY MOUTH THREE TIMES DAILY 270 Tablet 0    propranoloL (INDERAL) 40 mg tablet Take 1 tablet by mouth twice daily 180 Tab 0    sildenafil citrate (VIAGRA) 50 mg tablet Take 1 Tab by mouth as needed for Erectile Dysfunction. 27 Tab 2     Social History     Socioeconomic History    Marital status:      Spouse name: Not on file    Number of children: Not on file    Years of education: Not on file    Highest education level: Not on file   Occupational History    Occupation:      Employer: [employer]   Tobacco Use    Smoking status: Never Smoker    Smokeless tobacco: Never Used   Vaping Use    Vaping Use: Never used   Substance and Sexual Activity    Alcohol use: No    Drug use: No    Sexual activity: Yes     Partners: Female     Social Determinants of Health     Financial Resource Strain:     Difficulty of Paying Living Expenses:    Food Insecurity:     Worried About Running Out of Food in the Last Year:     [address] in the Last Year:    Transportation Needs:     Lack of Transportation (Medical):      Lack of Transportation (Non-Medical):    Physical Activity:     Days of Exercise per Week:     Minutes of Exercise per Session:    Stress:     Feeling of Stress :    Social Connections:     Frequency of Communication with Friends and Family:     Frequency of Social Gatherings with Friends and Family:     Attends Sabian[employer] Services:     Active Member of Clubs or Organizations:     Attends Club or Organization Meetings:     Marital Status:      Family History   Problem Relation Age of Onset    Cancer Maternal Uncle         prostate    Stroke Maternal Uncle     Stroke Maternal Grandmother        Health Maintenance   Topic Date Due    DTaP/Tdap/Td series (1 - Tdap) Never done    Shingrix Vaccine Age 50> (1 of 2) Never done    Pneumococcal 65+ yrs at Risk Vaccine (2 of 2 - PPSV23) 09/26/2019    Flu Vaccine (1) 09/01/2021    Medicare Yearly Exam  08/18/2022    Colorectal Cancer Screening Combo  07/03/2024    Lipid Screen  07/01/2025    Hepatitis C Screening  Completed    COVID-19 Vaccine  Completed        Review of Systems  Constitutional: negative for fevers, chills, anorexia and weight loss  Eyes:   negative for visual disturbance and irritation  ENT:   negative for tinnitus,sore throat,nasal congestion,ear pain,hoarseness  Respiratory:  negative for cough, hemoptysis, dyspnea,wheezing  CV:   negative for chest pain, palpitations, lower extremity edema  GI:   negative for nausea, vomiting, diarrhea, abdominal pain,melena  Endo:               negative for polyuria,polydipsia,polyphagia,heat intolerance  Genitourinary: negative for frequency, dysuria and hematuria  Integumentary: negative for rash and pruritus  Hematologic:  negative for easy bruising and gum/nose bleeding  Musculoskel: Positive for right shoulder pain and limitation of range of motion  Neurological:  negative for headaches, dizziness, vertigo, memory problems and gait   Behavl/Psych: negative for feelings of anxiety, depression, mood changes  ROS otherwise negative      Objective:  Visit Vitals  /80 (BP 1 Location: Right arm, BP Patient Position: Sitting, BP Cuff Size: Adult)   Pulse 72   Temp 97.9 °F (36.6 °C) (Oral)   Resp 16   Ht 5' 7\" (1.702 m)   Wt 164 lb 3.2 oz (74.5 kg)   SpO2 97%   BMI 25.72 kg/m²     Body mass index is 25.72 kg/m². Physical Exam:   General appearance - alert, well appearing, and in no distress  Mental status - alert, oriented to person, place, and time  EYE-SAI, EOMI,conjunctiva normal bilaterally, lids normal  ENT-ENT exam normal, no neck nodes or sinus tenderness  Nose - normal and patent, no erythema,  Or discharge   Mouth - mucous membranes moist, pharynx normal without lesions  Neck - supple, no significant adenopathy or bruit  Chest - clear to auscultation, no wheezes, rales or rhonchi. Heart - normal rate, regular rhythm, normal S1, S2, no murmurs, rubs, clicks or gallops   Abdomen - soft, nontender, nondistended, no masses or organomegaly  Lymph- no adenopathy palpable  Ext-examination of the right shoulder reveals a limited range of motion with a borderline Apley scratch test present. Upper extremity strength and range of motion of the neck are normal.  DTRs are intact. Speeds test was positive. There is no pain with palpation over the supraspinatus or infraspinatus muscles. There is [facility]. Skin-Warm and dry. no hyperpigmentation, vitiligo, or suspicious lesions  Neuro -alert, oriented, normal speech, no focal findings or movement disorder noted    In addition this patient is seen for AWV  as detailed below: This is a Subsequent Medicare Annual Wellness Exam (AWV) (Performed 12 months after IPPE or effective date of Medicare Part B enrollment)    I have reviewed the patient's medical history in detail and updated the computerized patient record. Problem list reviewed with patient and risk factors discussed. PSH, SH, FH, Medications and HM issues also reviewed and discussed. Depression screen, fall risk assessment, functional abilities and ACP also reviewed and discussed as above and below.     Depression Risk Factor Screening:     3 most recent PHQ Screens 2/12/2021   Little interest or pleasure in doing things Not at all   Feeling down, depressed, irritable, or hopeless Not at all   Total Score PHQ 2 0     Alcohol Risk Factor Screening: You do not drink alcohol or very rarely. Functional Ability and Level of Safety:   Hearing Loss  Hearing is good. Activities of Daily Living  The home contains: no safety equipment. Patient does total self care    Fall Risk  Fall Risk Assessment, last 12 mths 2/12/2021   Able to walk? Yes   Fall in past 12 months? 0   Do you feel unsteady? 0   Are you worried about falling 0       Abuse Screen  Patient is not abused    Cognitive Screening   Evaluation of Cognitive Function:  Has your family/caregiver stated any concerns about your memory: no  Normal    Patient Care Team   Patient Care Team:  Justin Oneil MD as PCP - General (Internal Medicine)  Justin Oneil MD as PCP - [facility] Provider  Zana Cohen MD (Urology)  Rufino Atkinson MD (Hematology and Oncology)    Assessment/Plan   Education and counseling provided:  Are appropriate based on today's review and evaluation    Assessment/Plan:   Impressions:      ICD-10-CM ICD-9-CM    1. Essential hypertension  I10 401.9 COLLECTION VENOUS BLOOD,VENIPUNCTURE      CBC WITH AUTOMATED DIFF      METABOLIC PANEL, COMPREHENSIVE      URINALYSIS W/ REFLEX CULTURE   2. Medicare annual wellness visit, subsequent  Z00.00 V70.0    3. Pure hypercholesterolemia  E78.00 272.0 LIPID PANEL      TSH 3RD GENERATION   4. Tendonitis, bicipital, right  M75.21 726.12 diclofenac EC (VOLTAREN) 75 mg EC tablet        Plan:  1. Continue present meds  2. Lifestyle modifications including Na restriction, low carb/fat diet, weight reduction and exercise (at least a walking program). Follow-up and Dispositions    · Return in about 6 months (around 2/17/2022).            Orders Placed This Encounter    CBC WITH AUTOMATED DIFF     Standing Status:   Future Standing Expiration Date:   8/17/2022    LIPID PANEL     Standing Status:   Future     Standing Expiration Date:   3/03/7238    METABOLIC PANEL, COMPREHENSIVE     Standing Status:   Future     Standing Expiration Date:   8/17/2022    TSH 3RD GENERATION     Standing Status:   Future     Standing Expiration Date:   8/17/2022    URINALYSIS W/ REFLEX CULTURE     Standing Status:   Future     Standing Expiration Date:   8/17/2022    COLLECTION VENOUS BLOOD,VENIPUNCTURE    diclofenac EC (VOLTAREN) 75 mg EC tablet     Sig: Take 1 Tablet by mouth two (2) times a day. Dispense:  60 Tablet     Refill:  0       Anderson Kuhn MD   Assessment/Plan:  Diagnoses and all orders for this visit:    Essential hypertension  -     COLLECTION VENOUS BLOOD,VENIPUNCTURE  -     CBC WITH AUTOMATED DIFF; Future  -     METABOLIC PANEL, COMPREHENSIVE; Future  -     URINALYSIS W/ REFLEX CULTURE; Future    Medicare annual wellness visit, subsequent    Pure hypercholesterolemia  -     LIPID PANEL; Future  -     TSH 3RD GENERATION; Future    Tendonitis, bicipital, right  -     diclofenac EC (VOLTAREN) 75 mg EC tablet; Take 1 Tablet by mouth two (2) times a day., Normal, Disp-60 Tablet, R-0        Health Maintenance Due   Topic Date Due    DTaP/Tdap/Td series (1 - Tdap) Never done    Shingrix Vaccine Age 50> (1 of 2) Never done    Pneumococcal 65+ yrs at Risk Vaccine (2 of 2 - PPSV23) 09/26/2019       Other instructions: The patient's medications were reviewed and reconciled. No change in his current medical regimen will be made. Start diclofenac for his bicipital tendinitis. Reduction in lifting and hammering has been recommended along with moist heat and range of motion exercises. If not improved with conservative management we will need to consider orthopedic evaluation. A no added salt, prudent diet is encouraged.     Health maintenance issues were reviewed and are up-to-date    Age-appropriate vaccinations were reviewed and Tdap, shingles vaccine series and Pneumovax 23 have been recommended. Patient states that he will have his Td[facility] vaccination [facility] when he goes to  his prescription. Await results of multiple labs    Follow-up in 6 months    Follow-up and Dispositions    · Return in about 6 months (around 2/17/2022). I have reviewed with the patient details of the assessment and plan and all questions were answered. Relevent patient education was performed. The most recent lab findings were reviewed with the patient. An After Visit Summary was printed and given to the patient. Prince Alessio MD    Please note that this dictation was completed with [employer], the computer voice recognition software. Quite often unanticipated grammatical, syntax, homophones, and other interpretive errors are inadvertently transcribed by the computer software. Please disregard these errors. Please excuse any errors that have escaped final proofreading.

## 2021-08-17 NOTE — PROGRESS NOTES
Chief Complaint   Patient presents with    Follow Up Chronic Condition     6 mo fu    Annual Wellness Visit       Depression Risk Factor Screening:     3 most recent PHQ Screens 2/12/2021   Little interest or pleasure in doing things Not at all   Feeling down, depressed, irritable, or hopeless Not at all   Total Score PHQ 2 0       Functional Ability and Level of Safety:     Activities of Daily Living  ADL Assessment 2/12/2021   Feeding yourself No Help Needed   Getting from bed to chair No Help Needed   Getting dressed No Help Needed   Bathing or showering No Help Needed   Walk across the room (includes cane/walker) No Help Needed   Using the telphone No Help Needed   Taking your medications No Help Needed   Preparing meals No Help Needed   Managing money (expenses/bills) No Help Needed   Moderately strenuous housework (laundry) No Help Needed   Shopping for personal items (toiletries/medicines) No Help Needed   Shopping for groceries No Help Needed   Driving No Help Needed   Climbing a flight of stairs No Help Needed   Getting to places beyond walking distances No Help Needed       Fall Risk  Fall Risk Assessment, last 12 mths 2/12/2021   Able to walk? Yes   Fall in past 12 months? 0   Do you feel unsteady? 0   Are you worried about falling 0       Abuse Screen  Abuse Screening Questionnaire 2/12/2021   Do you ever feel afraid of your partner? N   Are you in a relationship with someone who physically or mentally threatens you? N   Is it safe for you to go home?  Y         Patient Care Team   Patient Care Team:  Miracle Marquez MD as PCP - General (Internal Medicine)  Miracle Marquez MD as PCP - REHABILITATION [facility] Empaneled Provider  Ramírez Butts MD (Urology)  Julio Hansen MD (Hematology and Oncology)

## 2021-08-17 NOTE — PATIENT INSTRUCTIONS
DASH Diet: Care Instructions  Your Care Instructions     The DASH diet is an eating plan that can help lower your blood pressure. DASH stands for Dietary Approaches to Stop Hypertension. Hypertension is high blood pressure. The DASH diet focuses on eating foods that are high in calcium, potassium, and magnesium. These nutrients can lower blood pressure. The foods that are highest in these nutrients are fruits, vegetables, low-fat dairy products, nuts, seeds, and legumes. But taking calcium, potassium, and magnesium supplements instead of eating foods that are high in those nutrients does not have the same effect. The DASH diet also includes whole grains, fish, and poultry. The DASH diet is one of several lifestyle changes your doctor may recommend to lower your high blood pressure. Your doctor may also want you to decrease the amount of sodium in your diet. Lowering sodium while following the DASH diet can lower blood pressure even further than just the DASH diet alone. Follow-up care is a key part of your treatment and safety. Be sure to make and go to all appointments, and call your doctor if you are having problems. It's also a good idea to know your test results and keep a list of the medicines you take. How can you care for yourself at home? Following the DASH diet  · Eat 4 to 5 servings of fruit each day. A serving is 1 medium-sized piece of fruit, ½ cup chopped or canned fruit, 1/4 cup dried fruit, or 4 ounces (½ cup) of fruit juice. Choose fruit more often than fruit juice. · Eat 4 to 5 servings of vegetables each day. A serving is 1 cup of lettuce or raw leafy vegetables, ½ cup of chopped or cooked vegetables, or 4 ounces (½ cup) of vegetable juice. Choose vegetables more often than vegetable juice. · Get 2 to 3 servings of low-fat and fat-free dairy each day. A serving is 8 ounces of milk, 1 cup of yogurt, or 1 ½ ounces of cheese. · Eat 6 to 8 servings of grains each day.  A serving is 1 slice of bread, 1 ounce of dry cereal, or ½ cup of cooked rice, pasta, or cooked cereal. Try to choose whole-grain products as much as possible. · Limit lean meat, poultry, and fish to 2 servings each day. A serving is 3 ounces, about the size of a deck of cards. · Eat 4 to 5 servings of nuts, seeds, and legumes (cooked dried beans, lentils, and split peas) each week. A serving is 1/3 cup of nuts, 2 tablespoons of seeds, or ½ cup of cooked beans or peas. · Limit fats and oils to 2 to 3 servings each day. A serving is 1 teaspoon of vegetable oil or 2 tablespoons of salad dressing. · Limit sweets and added sugars to 5 servings or less a week. A serving is 1 tablespoon jelly or jam, ½ cup sorbet, or 1 cup of lemonade. · Eat less than 2,300 milligrams (mg) of sodium a day. If you limit your sodium to 1,500 mg a day, you can lower your blood pressure even more. · Be aware that all of these are the suggested number of servings for people who eat 1,800 to 2,000 calories a day. Your recommended number of servings may be different if you need more or fewer calories. Tips for success  · Start small. Do not try to make dramatic changes to your diet all at once. You might feel that you are missing out on your favorite foods and then be more likely to not follow the plan. Make small changes, and stick with them. Once those changes become habit, add a few more changes. · Try some of the following:  ? Make it a goal to eat a fruit or vegetable at every meal and at snacks. This will make it easy to get the recommended amount of fruits and vegetables each day. ? Try yogurt topped with fruit and nuts for a snack or healthy dessert. ? Add lettuce, tomato, cucumber, and onion to sandwiches. ? Combine a ready-made pizza crust with low-fat mozzarella cheese and lots of vegetable toppings. Try using tomatoes, squash, spinach, broccoli, carrots, cauliflower, and onions. ?  Have a variety of cut-up vegetables with a low-fat dip as an appetizer instead of chips and dip. ? Sprinkle sunflower seeds or chopped almonds over salads. Or try adding chopped walnuts or almonds to cooked vegetables. ? Try some vegetarian meals using beans and peas. Add garbanzo or kidney beans to salads. Make burritos and tacos with mashed cruz beans or black beans. Where can you learn more? Go to http://www.wise.com/  Enter H967 in the search box to learn more about \"DASH Diet: Care Instructions. \"  Current as of: August 31, 2020               Content Version: 12.8  © 5364-3049 [employer]. Care instructions adapted under license by [employer] (which disclaims liability or warranty for this information). If you have questions about a medical condition or this instruction, always ask your healthcare professional. [place] [address] any warranty or liability for your use of this information. The best way to stay healthy is to live a healthy lifestyle. A healthy lifestyle includes regular exercise, eating a well-balanced diet, keeping a healthy weight and not smoking. Regular physical exams and screening tests are another important way to take care of yourself. Preventive exams provided by health care providers can find health problems early when treatment works best and can keep you from getting certain diseases or illnesses. Preventive services include exams, lab tests, screenings, shots, monitoring and information to help you take care of your own health. All people over 65 should have a pneumonia shot. Pneumonia shots are usually only needed once in a lifetime unless your doctor decides differently. In addition to your physical exam, some screening tests are recommended:    All people over 65 should have a yearly flu shot. People over 65 are at medium to high risk for Hepatitis B. Three shots are needed for complete protection.      Bone mass measurement (dexa scan) is recommended every two years. Diabetes Mellitus screening is recommended every year. Glaucoma is an eye disease caused by high pressure in the eye. An eye exam is recommended every year. Cardiovascular screening tests that check your cholesterol and other blood fat (lipid) levels are recommended every five years. Colorectal Cancer screening tests help to find pre-cancerous polyps (growths in the colon) so they can be removed before they turn into cancer. Tests ordered for screening depend on your personal and family history risk factors. Prostate Cancer Screening (annually up to age 76)    Screening for breast cancer is recommended yearly with a Mammogram.    Screening for cervical and vaginal cancer is recommended with a pelvic and Pap test every two years. However if you have had an abnormal pap in the past  three years or at high risk for cervical or vaginal cancer Medicare will cover a pap test and a pelvic exam every year.      Here is a list of your current Health Maintenance items with a due date:  Health Maintenance Due   Topic Date Due    DTaP/Tdap/Td  (1 - Tdap) Never done    Shingles Vaccine (1 of 2) Never done    Pneumococcal Vaccine 65+ years at Risk (2 of 2 - PPSV23) 09/26/2019    Annual Well Visit  07/02/2021

## 2021-08-18 LAB
ALBUMIN SERPL-MCNC: 4 G/DL (ref 3.8–4.8)
ALBUMIN/GLOB SERPL: 1.2 {RATIO} (ref 1.2–2.2)
ALP SERPL-CCNC: 110 IU/L (ref 48–121)
ALT SERPL-CCNC: 30 IU/L (ref 0–44)
APPEARANCE UR: CLEAR
AST SERPL-CCNC: 26 IU/L (ref 0–40)
BACTERIA #/AREA URNS HPF: NORMAL /[HPF]
BASOPHILS # BLD AUTO: 0 X10E3/UL (ref 0–0.2)
BASOPHILS NFR BLD AUTO: 1 %
BILIRUB SERPL-MCNC: 0.3 MG/DL (ref 0–1.2)
BILIRUB UR QL STRIP: NEGATIVE
BUN SERPL-MCNC: 16 MG/DL (ref 8–27)
BUN/CREAT SERPL: 15 (ref 10–24)
CALCIUM SERPL-MCNC: 9.1 MG/DL (ref 8.6–10.2)
CASTS URNS QL MICRO: NORMAL /LPF
CHLORIDE SERPL-SCNC: 102 MMOL/L (ref 96–106)
CHOLEST SERPL-MCNC: 178 MG/DL (ref 100–199)
CO2 SERPL-SCNC: 26 MMOL/L (ref 20–29)
COLOR UR: YELLOW
CREAT SERPL-MCNC: 1.07 MG/DL (ref 0.76–1.27)
EOSINOPHIL # BLD AUTO: 0.2 X10E3/UL (ref 0–0.4)
EOSINOPHIL NFR BLD AUTO: 4 %
EPI CELLS #/AREA URNS HPF: NORMAL /HPF (ref 0–10)
ERYTHROCYTE [DISTWIDTH] IN BLOOD BY AUTOMATED COUNT: 12.6 % (ref 11.6–15.4)
GLOBULIN SER CALC-MCNC: 3.4 G/DL (ref 1.5–4.5)
GLUCOSE SERPL-MCNC: 134 MG/DL (ref 65–99)
GLUCOSE UR QL: NEGATIVE
HCT VFR BLD AUTO: 39.8 % (ref 37.5–51)
HDLC SERPL-MCNC: 40 MG/DL
HGB BLD-MCNC: 13.3 G/DL (ref 13–17.7)
HGB UR QL STRIP: NEGATIVE
IMM GRANULOCYTES # BLD AUTO: 0 X10E3/UL (ref 0–0.1)
IMM GRANULOCYTES NFR BLD AUTO: 0 %
KETONES UR QL STRIP: NEGATIVE
LDLC SERPL CALC-MCNC: 124 MG/DL (ref 0–99)
LEUKOCYTE ESTERASE UR QL STRIP: NEGATIVE
LYMPHOCYTES # BLD AUTO: 1.4 X10E3/UL (ref 0.7–3.1)
LYMPHOCYTES NFR BLD AUTO: 29 %
MCH RBC QN AUTO: 28.2 PG (ref 26.6–33)
MCHC RBC AUTO-ENTMCNC: 33.4 G/DL (ref 31.5–35.7)
MCV RBC AUTO: 85 FL (ref 79–97)
MICRO URNS: NORMAL
MICRO URNS: NORMAL
MONOCYTES # BLD AUTO: 0.3 X10E3/UL (ref 0.1–0.9)
MONOCYTES NFR BLD AUTO: 5 %
NEUTROPHILS # BLD AUTO: 2.8 X10E3/UL (ref 1.4–7)
NEUTROPHILS NFR BLD AUTO: 61 %
NITRITE UR QL STRIP: NEGATIVE
PH UR STRIP: 7 [PH] (ref 5–7.5)
PLATELET # BLD AUTO: 262 X10E3/UL (ref 150–450)
POTASSIUM SERPL-SCNC: 4.2 MMOL/L (ref 3.5–5.2)
PROT SERPL-MCNC: 7.4 G/DL (ref 6–8.5)
PROT UR QL STRIP: NEGATIVE
RBC # BLD AUTO: 4.71 X10E6/UL (ref 4.14–5.8)
RBC #/AREA URNS HPF: NORMAL /HPF (ref 0–2)
SODIUM SERPL-SCNC: 139 MMOL/L (ref 134–144)
SP GR UR: 1.02 (ref 1–1.03)
TRIGL SERPL-MCNC: 72 MG/DL (ref 0–149)
TSH SERPL DL<=0.005 MIU/L-ACNC: 1.65 UIU/ML (ref 0.45–4.5)
URINALYSIS REFLEX, 377202: NORMAL
UROBILINOGEN UR STRIP-MCNC: 0.2 MG/DL (ref 0.2–1)
VLDLC SERPL CALC-MCNC: 14 MG/DL (ref 5–40)
WBC # BLD AUTO: 4.7 X10E3/UL (ref 3.4–10.8)
WBC #/AREA URNS HPF: NORMAL /HPF (ref 0–5)

## 2021-09-22 DIAGNOSIS — I10 ESSENTIAL HYPERTENSION: Chronic | ICD-10-CM

## 2021-09-22 RX ORDER — PROPRANOLOL HYDROCHLORIDE 40 MG/1
TABLET ORAL
Qty: 180 TABLET | Refills: 0 | Status: SHIPPED | OUTPATIENT
Start: 2021-09-22 | End: 2022-02-28

## 2021-11-03 DIAGNOSIS — I10 ESSENTIAL HYPERTENSION: Chronic | ICD-10-CM

## 2021-11-03 RX ORDER — AMLODIPINE BESYLATE 10 MG/1
TABLET ORAL
Qty: 90 TABLET | Refills: 0 | Status: SHIPPED | OUTPATIENT
Start: 2021-11-03 | End: 2022-02-28

## 2022-02-21 ENCOUNTER — OFFICE VISIT (OUTPATIENT)
Dept: INTERNAL MEDICINE CLINIC | Age: 67
End: 2022-02-21
Payer: COMMERCIAL

## 2022-02-21 VITALS
RESPIRATION RATE: 16 BRPM | SYSTOLIC BLOOD PRESSURE: 130 MMHG | HEART RATE: 80 BPM | TEMPERATURE: 97.8 F | DIASTOLIC BLOOD PRESSURE: 80 MMHG | OXYGEN SATURATION: 99 % | WEIGHT: 165 LBS | BODY MASS INDEX: 25.9 KG/M2 | HEIGHT: 67 IN

## 2022-02-21 DIAGNOSIS — C83.10 MANTLE CELL LYMPHOMA, UNSPECIFIED BODY REGION (HCC): ICD-10-CM

## 2022-02-21 DIAGNOSIS — E78.00 PURE HYPERCHOLESTEROLEMIA: Chronic | ICD-10-CM

## 2022-02-21 DIAGNOSIS — I10 PRIMARY HYPERTENSION: Primary | Chronic | ICD-10-CM

## 2022-02-21 DIAGNOSIS — C61 PROSTATE CANCER (HCC): ICD-10-CM

## 2022-02-21 PROCEDURE — G8752 SYS BP LESS 140: HCPCS | Performed by: INTERNAL MEDICINE

## 2022-02-21 PROCEDURE — 3017F COLORECTAL CA SCREEN DOC REV: CPT | Performed by: INTERNAL MEDICINE

## 2022-02-21 PROCEDURE — G8754 DIAS BP LESS 90: HCPCS | Performed by: INTERNAL MEDICINE

## 2022-02-21 PROCEDURE — G8510 SCR DEP NEG, NO PLAN REQD: HCPCS | Performed by: INTERNAL MEDICINE

## 2022-02-21 PROCEDURE — G8427 DOCREV CUR MEDS BY ELIG CLIN: HCPCS | Performed by: INTERNAL MEDICINE

## 2022-02-21 PROCEDURE — G8419 CALC BMI OUT NRM PARAM NOF/U: HCPCS | Performed by: INTERNAL MEDICINE

## 2022-02-21 PROCEDURE — 1101F PT FALLS ASSESS-DOCD LE1/YR: CPT | Performed by: INTERNAL MEDICINE

## 2022-02-21 PROCEDURE — 99213 OFFICE O/P EST LOW 20 MIN: CPT | Performed by: INTERNAL MEDICINE

## 2022-02-21 PROCEDURE — G8536 NO DOC ELDER MAL SCRN: HCPCS | Performed by: INTERNAL MEDICINE

## 2022-02-21 NOTE — PROGRESS NOTES
Subjective:     Mr. Sherrill Mendoza returns to the office today in general medical follow-up. The patient has hypertension currently managed on amlodipine and Inderal.  He does take a potassium supplement with this as well. He tolerates this regimen without any orthostatic dizziness, fatigue or lower extremity edema. Denies headaches, numbness, tingling or focal neurological problems. He has a mild hyperlipidemia currently managing this with diet. He has no history of vascular disease and denies exertional chest pains or claudication. He continues to be followed by Pramod Salcedo at [facility]. He previously had had a prostatectomy. He is followed on a 6-month basis with PSAs having been less than 1. He continues to be followed at the [address] [place] [address] for mantle cell lymphoma. He has had no recurrent symptoms and is being monitored every 6 months.     Past Medical History:   Diagnosis Date    Benign non-nodular prostatic hyperplasia without lower urinary tract symptoms 8/21/2017    Cancer [facility])     prostate - surgery/lymphoma    Cellulitis of neck 8/21/2017    Chronic rhinitis 8/21/2017    Colon polyp 8/21/2017    ED (erectile dysfunction) of organic origin 8/21/2017    GERD (gastroesophageal reflux disease)     HNP (herniated nucleus pulposus) 8/21/2017    HTN (hypertension) 9/1/2012    Hyperlipidemia 8/21/2017    Hypertension     Hypokalemia 8/21/2017    Mantle cell lymphoma ([address] 8/21/2017    Narcolepsy 8/21/2017    Other ill-defined conditions(799.89)     hand laceration sutured    Prostate cancer ([address] 8/21/2017     Past Surgical History:   Procedure Laterality Date    COLONOSCOPY N/A 7/3/2019    COLONOS[facility] performed [facility] Dileep Julian [facility] at [facility] ENDOSCOPY    HX OTHER SURGICAL      surgery for lymphoma - abdomen    HX PROSTATECTOMY       Allergies   Allergen Reactions    Lisinopril Cough     Current Outpatient Medications   Medication Sig Dispense Refill    amLODIPine (NORVASC) 10 mg tablet Take 1 tablet by mouth once daily 90 Tablet 0    propranoloL (INDERAL) 40 mg tablet Take 1 tablet by mouth twice daily 180 Tablet 0    potassium chloride (K-DUR, KLOR-CON) 20 mEq tablet TAKE 1 TABLET BY MOUTH THREE TIMES DAILY 270 Tablet 0     Social History     Socioeconomic History    Marital status:    Occupational History    Occupation: [employer]     Employer: [employer]   Tobacco Use    Smoking status: Never Smoker    Smokeless tobacco: Never Used   Vaping Use    Vaping Use: Never used   Substance and Sexual Activity    Alcohol use: No    Drug use: No    Sexual activity: Yes     Partners: Female     Family History   Problem Relation Age of Onset    Cancer Maternal Uncle         prostate    Stroke Maternal Uncle     Stroke Maternal Grandmother        Review of Systems:  GEN: no weight loss, weight gain, fatigue or night sweats  CV: no PND, orthopnea, or palpitations  Resp: no dyspnea on exertion, no cough  Abd: no nausea, vomiting or diarrhea  EXT: denies edema, claudication  Endocrine: no hair loss, excessive thirst or polyuria  Neurological ROS: no TIA or stroke symptoms  ROS otherwise negative      Objective:     Visit Vitals  /80   Pulse 80   Temp 97.8 °F (36.6 °C) (Oral)   Resp 16   Ht 5' 7\" (1.702 m)   Wt 165 lb (74.8 kg)   SpO2 99%   BMI 25.84 kg/m²     Body mass index is 25.84 kg/m². General:   alert, cooperative and no distress   Eyes: conjunctivae/sclerae clear. PERRL, EOM's intact   Mouth:  No oral lesions, no pharyngeal erythema, no exudates   Neck: Trachea midline, no thyromegaly, no bruits   Heart: S1 and S2 normal,no murmurs noted    Lungs: Clear to auscultation bilaterally, no increased work of breathing   Abdomen: Soft, nontender.   Normal bowel sounds   Extremities: No edema or cyanosis   Neuro: ..alert, oriented x3,speech normal in context and clarity, cranial nerves II-XII intact,motor strength: full proximally and distally,gait: normal  reflexes: full and symmetric     Physical exam otherwise negative         Assessment/Plan:     Diagnoses and all orders for this visit:    Primary hypertension    Pure hypercholesterolemia    Mantle cell lymphoma, unspecified body region [facility])    Prostate cancer ([facility]        Other instructions: The patient's medications were reviewed and reconciled. No change in his current medical regimen will be made. A no added salt, prudent diet is encouraged    Labs from 8/17 were reviewed with the patient today    Follow-up 6 months    Follow-up and Dispositions    · Return in about 6 months (around 8/21/2022). Merlyn Pickard MD    Please note that this dictation was completed with [employer], the computer voice recognition software. Quite often unanticipated grammatical, syntax, homophones, and other interpretive errors are inadvertently transcribed by the computer software. Please disregard these errors. Please excuse any errors that have escaped final proofreading.

## 2022-02-21 NOTE — PATIENT INSTRUCTIONS
DASH Diet: Care Instructions  Your Care Instructions     The DASH diet is an eating plan that can help lower your blood pressure. DASH stands for Dietary Approaches to Stop Hypertension. Hypertension is high blood pressure. The DASH diet focuses on eating foods that are high in calcium, potassium, and magnesium. These nutrients can lower blood pressure. The foods that are highest in these nutrients are fruits, vegetables, low-fat dairy products, nuts, seeds, and legumes. But taking calcium, potassium, and magnesium supplements instead of eating foods that are high in those nutrients does not have the same effect. The DASH diet also includes whole grains, fish, and poultry. The DASH diet is one of several lifestyle changes your doctor may recommend to lower your high blood pressure. Your doctor may also want you to decrease the amount of sodium in your diet. Lowering sodium while following the DASH diet can lower blood pressure even further than just the DASH diet alone. Follow-up care is a key part of your treatment and safety. Be sure to make and go to all appointments, and call your doctor if you are having problems. It's also a good idea to know your test results and keep a list of the medicines you take. How can you care for yourself at home? Following the DASH diet  · Eat 4 to 5 servings of fruit each day. A serving is 1 medium-sized piece of fruit, ½ cup chopped or canned fruit, 1/4 cup dried fruit, or 4 ounces (½ cup) of fruit juice. Choose fruit more often than fruit juice. · Eat 4 to 5 servings of vegetables each day. A serving is 1 cup of lettuce or raw leafy vegetables, ½ cup of chopped or cooked vegetables, or 4 ounces (½ cup) of vegetable juice. Choose vegetables more often than vegetable juice. · Get 2 to 3 servings of low-fat and fat-free dairy each day. A serving is 8 ounces of milk, 1 cup of yogurt, or 1 ½ ounces of cheese. · Eat 6 to 8 servings of grains each day.  A serving is 1 slice of bread, 1 ounce of dry cereal, or ½ cup of cooked rice, pasta, or cooked cereal. Try to choose whole-grain products as much as possible. · Limit lean meat, poultry, and fish to 2 servings each day. A serving is 3 ounces, about the size of a deck of cards. · Eat 4 to 5 servings of nuts, seeds, and legumes (cooked dried beans, lentils, and split peas) each week. A serving is 1/3 cup of nuts, 2 tablespoons of seeds, or ½ cup of cooked beans or peas. · Limit fats and oils to 2 to 3 servings each day. A serving is 1 teaspoon of vegetable oil or 2 tablespoons of salad dressing. · Limit sweets and added sugars to 5 servings or less a week. A serving is 1 tablespoon jelly or jam, ½ cup sorbet, or 1 cup of lemonade. · Eat less than 2,300 milligrams (mg) of sodium a day. If you limit your sodium to 1,500 mg a day, you can lower your blood pressure even more. · Be aware that all of these are the suggested number of servings for people who eat 1,800 to 2,000 calories a day. Your recommended number of servings may be different if you need more or fewer calories. Tips for success  · Start small. Do not try to make dramatic changes to your diet all at once. You might feel that you are missing out on your favorite foods and then be more likely to not follow the plan. Make small changes, and stick with them. Once those changes become habit, add a few more changes. · Try some of the following:  ? Make it a goal to eat a fruit or vegetable at every meal and at snacks. This will make it easy to get the recommended amount of fruits and vegetables each day. ? Try yogurt topped with fruit and nuts for a snack or healthy dessert. ? Add lettuce, tomato, cucumber, and onion to sandwiches. ? Combine a ready-made pizza crust with low-fat mozzarella cheese and lots of vegetable toppings. Try using tomatoes, squash, spinach, broccoli, carrots, cauliflower, and onions. ?  Have a variety of cut-up vegetables with a low-fat dip as an appetizer instead of chips and dip. ? Sprinkle sunflower seeds or chopped almonds over salads. Or try adding chopped walnuts or almonds to cooked vegetables. ? Try some vegetarian meals using beans and peas. Add garbanzo or kidney beans to salads. Make burritos and tacos with mashed cruz beans or black beans. Where can you learn more? Go to http://www.wise.com/  Enter H967 in the search box to learn more about \"DASH Diet: Care Instructions. \"  Current as of: April 29, 2021               Content Version: 13.0  © 7664-7834 [employer]. Care instructions adapted under license by [employer] (which disclaims liability or warranty for this information). If you have questions about a medical condition or this instruction, always ask your healthcare professional. [address] any warranty or liability for your use of this information.

## 2022-02-21 NOTE — PROGRESS NOTES
HIPAA verified by two patient identifiers. Wolfgang Jacinto is a 69 y.o. male    Chief Complaint   Patient presents with    Hypertension     6 months    Cholesterol Problem       Visit Vitals  /80 (BP 1 Location: Left upper arm, BP Patient Position: Sitting, BP Cuff Size: Adult)   Pulse 80   Temp 97.8 °F (36.6 °C) (Oral)   Resp 16   Ht 5' 7\" (1.702 m)   Wt 165 lb (74.8 kg)   SpO2 99%   BMI 25.84 kg/m²       Pain Scale: 0 - No pain/10  Pain Location:       Health Maintenance Due   Topic Date Due    DTaP/Tdap/Td series (1 - Tdap) Never done    Shingrix Vaccine Age 50> (1 of 2) Never done    Pneumococcal 65+ yrs at Risk Vaccine (2 of 2 - PPSV23) 09/26/2019    COVID-19 Vaccine (3 - Pfizer risk 4-dose series) 04/19/2021    Flu Vaccine (1) 09/01/2021    Depression Screen  02/12/2022         Coordination of Care Questionnaire:  :   1) Have you been to an emergency room, urgent care, or hospitalized since your last visit? If yes, where when, and reason for visit? no       2. Have seen or consulted any other health care provider since your last visit? If yes, where when, and reason for visit? NO      Patient is accompanied by self I have received verbal consent from Wolfgang Jacinto to discuss any/all medical information while they are present in the room.

## 2022-02-28 DIAGNOSIS — I10 ESSENTIAL HYPERTENSION: Chronic | ICD-10-CM

## 2022-02-28 RX ORDER — AMLODIPINE BESYLATE 10 MG/1
TABLET ORAL
Qty: 90 TABLET | Refills: 0 | Status: SHIPPED | OUTPATIENT
Start: 2022-02-28 | End: 2022-05-31 | Stop reason: SDUPTHER

## 2022-02-28 RX ORDER — POTASSIUM CHLORIDE 20 MEQ/1
TABLET, EXTENDED RELEASE ORAL
Qty: 270 TABLET | Refills: 0 | Status: SHIPPED | OUTPATIENT
Start: 2022-02-28 | End: 2022-05-31 | Stop reason: SDUPTHER

## 2022-02-28 RX ORDER — PROPRANOLOL HYDROCHLORIDE 40 MG/1
TABLET ORAL
Qty: 180 TABLET | Refills: 0 | Status: SHIPPED | OUTPATIENT
Start: 2022-02-28 | End: 2022-05-31 | Stop reason: SDUPTHER

## 2022-03-18 PROBLEM — N40.0 BENIGN NON-NODULAR PROSTATIC HYPERPLASIA WITHOUT LOWER URINARY TRACT SYMPTOMS: Status: ACTIVE | Noted: 2017-08-21

## 2022-03-18 PROBLEM — M75.21: Status: ACTIVE | Noted: 2021-08-17

## 2022-03-19 PROBLEM — E87.6 HYPOKALEMIA: Status: ACTIVE | Noted: 2017-08-21

## 2022-03-19 PROBLEM — N52.9 ED (ERECTILE DYSFUNCTION) OF ORGANIC ORIGIN: Status: ACTIVE | Noted: 2017-08-21

## 2022-03-19 PROBLEM — G47.419 NARCOLEPSY: Status: ACTIVE | Noted: 2017-08-21

## 2022-03-19 PROBLEM — J31.0 CHRONIC RHINITIS: Status: ACTIVE | Noted: 2017-08-21

## 2022-03-19 PROBLEM — E78.5 HYPERLIPIDEMIA: Status: ACTIVE | Noted: 2017-08-21

## 2022-03-19 PROBLEM — C61 PROSTATE CANCER (HCC): Status: ACTIVE | Noted: 2017-08-21

## 2022-03-19 PROBLEM — L03.221 CELLULITIS OF NECK: Status: ACTIVE | Noted: 2017-08-21

## 2022-03-20 PROBLEM — C83.10 MANTLE CELL LYMPHOMA (HCC): Status: ACTIVE | Noted: 2017-08-21

## 2022-03-20 PROBLEM — K63.5 COLON POLYP: Status: ACTIVE | Noted: 2017-08-21

## 2022-08-15 NOTE — PROGRESS NOTES
Subjective:     Chief Complaint   Patient presents with    [address] [place] [address] Road is a 69 y.o. M. He has a history of hypercholesterolemia, hypertension, and prostate cancer status post prior prostatectomy. I reviewed and updated the medical record. The patient was seen most recently by his previous primary care provider in [place] for a routine followup. His blood pressure was felt to be well controlled with amlodipine and Inderal. He was not using any medication for his history of hypercholesterolemia. Ongoing followup with Oncology at the [facility] [place] was noted to be ongoing every 6 months. No changes were made to his medication regimen. Followup in 6 months was recommended. Today, the patient comes in for Medicare wellness visit as well as routine follow-up on his chronic medical concerns. He reports feeling generally well overall today and over the past 6 months there has been no significant change to his overall clinical status. He continues to follow with his hematology/oncology provider for his history of mantle cell lymphoma as well as with his urologist for his history of prior prostate cancer. He has a pending CT scan to evaluate this further scheduled within the next few weeks. Currently, these problems are being managed mainly with observation although he notes that he had previously undergone prostatectomy and that his recent PSA numbers have been negative. He denies having had any recent fevers or chills, night sweats, or other constitutional or systemic complaints. No recent bleeding or bruising sequelae. He does complain today of having had increasing amounts of heartburn, which has been progressive over the past year. He says he started having heartburn about 4 to 5 years ago, and typically treats this with dietary modifications. He does not use medication for this on a regular basis.   He describes a burning sensation at the epigastrium radiating upwards with occasional association with a sour taste in his mouth. This is frequently associated with meals, and is not associated with any chest pain or shortness of breath, palpitations, orthopnea, or paroxysmal nocturnal dyspnea. There is no association with exertion. He denies having had any dysphagia or odynophagia, and denies any early satiety or weight changes. He denies any melena or hematochezia. He has not been treated or evaluated for H. pylori in the past.    Otherwise, the patient has been doing about the same as usual.  He is not usually measuring his blood pressure readings at home. He denies having had any chest pain or other cardiopulmonary symptoms. No lightheadedness or dizziness on current medication regimen. He is not currently taking any medications for cholesterol, and his ASCVD risk is noted to be elevated to about 17% today. He is amenable to beginning medication for this. We discussed the risks and benefits of atorvastatin today to include the risk of muscle aches and pains and other side effects. Routine Healthcare Maintenance issues are reviewed and discussed with the patient as noted below. Orders to update gaps in healthcare maintenance were placed as noted below in the Assessment and Plan, where applicable. Medicare Wellness Questions: Patient lives at home and is completely independent with regard to activities and instrumental activities of daily living. Patient does not drink alcohol to excess. Patient denies recent problems with memory, vision, hearing, balance or gait. Ambulates without difficulty. No abuse concerns. Patient denies recent depression or anxiety concerns. Patient is not using safety equipment in the home. 10-year Cardiovascular Risk Barak Chen, 2013):   The 10-year ASCVD risk score (Jamilah Salazar et al., 2013) is: 17.2%    Values used to calculate the score:      Age: 69 years      Sex: Male      Is Non- : Yes      Diabetic: No Tobacco smoker: No      Systolic Blood Pressure: 682 mmHg      Is BP treated: Yes      HDL Cholesterol: 40 mg/dL      Total Cholesterol: 178 mg/dL       Past Medical History:  Past Medical History:   Diagnosis Date    Chronic rhinitis 08/21/2017    ED (erectile dysfunction) of organic origin 08/21/2017    GERD (gastroesophageal reflux disease)     History of cellulitis 08/21/2017    of neck    History of colon polyps 08/21/2017    History of prostate cancer     prostate - surgery/lymphoma    Hyperlipidemia 08/21/2017    Hypertension     Hypokalemia 08/21/2017    Mantle cell lymphoma ([address] 08/21/2017    Narcolepsy 08/21/2017       Past Surgical Histor:  Past Surgical History:   Procedure Laterality Date    COLONOSCOPY N/A 7/3/2019    COLONOSCOPY performed by Jakub Rust MD at hospitals ENDOSCOPY    HX OTHER SURGICAL      surgery for lymphoma - abdomen    HX PROSTATECTOMY         Allergies: Allergies   Allergen Reactions    Lisinopril Cough       Medications:  Current Outpatient Medications   Medication Sig Dispense Refill    propranoloL (INDERAL) 40 mg tablet Take 1 Tablet by mouth two (2) times a day for 360 days. 180 Tablet 3    amLODIPine (NORVASC) 10 mg tablet Take 1 Tablet by mouth daily for 360 days.  90 Tablet 3    potassium chloride (K-DUR, KLOR-CON M20) 20 mEq tablet TAKE 1  BY MOUTH THREE TIMES DAILY 270 Tablet 3       Social History:  Social History     Socioeconomic History    Marital status:    Occupational History    Occupation:      Employer: [employer]   Tobacco Use    Smoking status: Never    Smokeless tobacco: Never   Vaping Use    Vaping Use: Never used   Substance and Sexual Activity    Alcohol use: No    Drug use: No    Sexual activity: Yes     Partners: Female       Family History:  Family History   Problem Relation Age of Onset    Cancer Maternal Uncle         prostate    Stroke Maternal Uncle     Stroke Maternal Grandmother        Immunizations:  Immunization History Administered Date(s) Administered    COVID-19, PFIZER PURPLE top, DILUTE for use, (age 15 y+), IM, 30mcg/0.3mL 03/01/2021, 03/22/2021    Influenza Vaccine 10/01/2020    Influenza Vaccine (Tri) Adjuvanted (>65 Yrs FLUAD TRI 68421) 09/26/2018    Influenza Vaccine Split 09/05/2012    Pneumococcal Conjugate (PCV-13) 09/26/2018        Healthcare Maintenance:  Health Maintenance   Topic Date Due    Shingrix Vaccine Age 50> (1 of 2) Never done    DTaP/Tdap/Td series (1 - Tdap) Never done    Pneumococcal 65+ years (2 - PPSV23 or PCV20) 09/26/2019    COVID-19 Vaccine (3 - Pfizer risk series) 04/19/2021    Flu Vaccine (1) 09/01/2022    Depression Screen  02/21/2023    Medicare Yearly Exam  08/23/2023    Colorectal Cancer Screening Combo  07/03/2024    Lipid Screen  08/17/2026    Hepatitis C Screening  Completed        Review of Systems:  ROS:  Review of Systems   Constitutional: Negative. Negative for chills, diaphoresis, fever, malaise/fatigue and weight loss. HENT: Negative. Eyes: Negative. Respiratory: Negative. Cardiovascular: Negative. Negative for chest pain, palpitations, orthopnea, claudication, leg swelling and PND. Gastrointestinal:  Positive for heartburn. Negative for abdominal pain, blood in stool, constipation, diarrhea, melena, nausea and vomiting. Genitourinary: Negative. Musculoskeletal: Negative. Skin: Negative. Neurological: Negative. Endo/Heme/Allergies: Negative. Psychiatric/Behavioral: Negative. ROS otherwise negative      Objective:     Vital Signs:  Visit Vitals  /84 (BP 1 Location: Left upper arm, BP Patient Position: Sitting, BP Cuff Size: Adult)   Pulse 67   Temp 98 °F (36.7 °C) (Oral)   Resp 16   Ht 5' 7\" (1.702 m)   Wt 162 lb 9.6 oz (73.8 kg)   SpO2 98%   BMI 25.47 kg/m²       BMI:  Body mass index is 25.47 kg/m². Physical Examination:  Physical Exam  Vitals reviewed. Constitutional:       Appearance: Normal appearance.    HENT:      Head: Normocephalic and atraumatic. Nose: Nose normal.      Mouth/Throat:      Mouth: Mucous membranes are moist.   Eyes:      Extraocular Movements: Extraocular movements intact. Conjunctiva/sclera: Conjunctivae normal.      Pupils: Pupils are equal, round, and reactive to light. Cardiovascular:      Rate and Rhythm: Normal rate and regular rhythm. Pulses: Normal pulses. Heart sounds: Normal heart sounds. No murmur heard. No friction rub. No gallop. Pulmonary:      Effort: Pulmonary effort is normal. No respiratory distress. Breath sounds: Normal breath sounds. No wheezing, rhonchi or rales. Abdominal:      General: Bowel sounds are normal. There is no distension. Palpations: Abdomen is soft. There is no mass. Tenderness: There is no abdominal tenderness. There is no guarding or rebound. Musculoskeletal:         General: No tenderness, deformity or signs of injury. Normal range of motion. Cervical back: Normal range of motion and neck supple. Right lower leg: No edema. Left lower leg: No edema. Skin:     General: Skin is warm and dry. Findings: No bruising, lesion or rash. Neurological:      General: No focal deficit present. Mental Status: He is alert and oriented to person, place, and time. Mental status is at baseline. Cranial Nerves: No cranial nerve deficit. Sensory: No sensory deficit. Motor: No weakness. Coordination: Coordination normal.      Gait: Gait normal.      Deep Tendon Reflexes: Reflexes normal.   Psychiatric:         Mood and Affect: Mood normal.         Behavior: Behavior normal.        Physical exam otherwise negative    Diagnostic Testing:    Laboratory Studies:  Abstract on 10/22/2021   Component Date Value Ref Range Status    SARS-CoV-2, SEVERIANO 05/18/2021 Not Detected   Final         Radiographic Studies:  XR Results (most recent):  No results found for this or any previous visit.      SIDNEY Results (most recent):  No results found for this or any previous visit. CT Results (most recent):  Results from Abstract encounter on 05/20/19    CT CHEST ABD PELV W CONT     DEXA Results (most recent):  No results found for this or any previous visit. MRI Results (most recent):  No results found for this or any previous visit. Assessment/Plan:       ICD-10-CM ICD-9-CM    1. Mantle cell lymphoma, unspecified body region ([facility] [address]  C83.10 200.40       2. Essential hypertension  I10 401.9       3. Pure hypercholesterolemia  E78.00 272.0       4. Gastroesophageal reflux disease, unspecified whether esophagitis present  K21.9 530.81       5. Medicare annual wellness visit, subsequent  Z00.00 V70.0                Healthcare Maintenance:  - Preventive measures are reviewed as per above  - Up to date on routine interventions except as noted above  - Orders placed to update gaps as noted  - Notes: Fasting labs ordered as noted above. See below for discussion regarding cholesterol. Discussed vaccines to be done at outside pharmacy. Essential Hypertension/Blood Pressure Management:   - Home BP Readings: not doing   - Current Control: optimal   - Target BP: Less than 140/90 mmHg   - Relevant BP Meds:  Key CAD CHF Meds               propranoloL (INDERAL) 40 mg tablet (Taking) Take 1 Tablet by mouth two (2) times a day for 360 days. amLODIPine (NORVASC) 10 mg tablet (Taking) Take 1 Tablet by mouth daily for 360 days.               - Plan: continue current treatment regimen, continue current meds, dietary sodium restriction, regular aerobic exercise   - Notes: Laboratory studies as noted above, otherwise asymptomatic, continuing with current care.     Hyperlipidemia/Dyslipidemia:   - Summary of Cardiovascular Risks and Goals:     LDL goal is under 100  hypertension  hyperlipidemia  [place] 10-year risk 10-20%   -   Lab Results   Component Value Date/Time    LDL, calculated 124 (H) 08/17/2021 08:48 AM    LDL, calculated 126 07/01/2020 08:41 AM    HDL Cholesterol 40 08/17/2021 08:48 AM       - Relevant Cholesterol Meds:  Key Antihyperlipidemia Meds       The patient is on no antihyperlipidemia meds. - Cholesterol at target: no   - Does patient meet USPSTF and ACC/AHA indications for pharmacotherapy (e.g., statin): yes   - GI symptoms with meds: N/A   - Muscle aches with meds: N/A   - Other Adverse effects with meds: N/A   - Medication Plan: start   - Notes: Discussed risk and benefits of atorvastatin with patient who is amenable to beginning today, this is ordered for him as noted. Checking labs as noted. History of Prostate Cancer:   - Following with urology by his report. He reports recent PSA testing has been unremarkable. Asymptomatic currently without any ongoing lower urinary tract symptoms or constitutional complaints. Continue follow-up with urology. Checking PSA today. History of Mantle Cell Lymphoma:   - Following closely with hematology/oncology. Asymptomatic overall. Checking CBC today. No splenomegaly on examination today. No recent infections or constitutional complaints. Continue follow-up with hematology/oncology. GERD:  - Suspected based upon his past history of this, doubt cardiac etiology given his lack of associated cardinal symptoms. No other alarm symptoms at this point. Probably reasonable to hold off on upper endoscopy although I counseled the patient that should he be proven to have H. pylori infection or if he should develop alarm symptoms such as dysphagia or odynophagia or early satiety or weight loss, then such an evaluation might be indicated. Holding off on PPI therapy pending receipt of H. pylori stool antigen; if this is negative anticipate empiric trial of PPI. No NSAID use ongoing. I have reviewed the patient's medical history in detail and updated the computerized patient record.       We had a prolonged discussion about these complex clinical issues and went over the various important aspects to consider. All questions were answered. Advised the patient to call back or return to office if symptoms do not improve, change in nature, or persist.     The patient was given an after visit summary or informed of [employer] Access which includes patient instructions, diagnoses, current medications, & vitals. he expressed understanding with the diagnosis and plan. David Olmos MD    Please note that this dictation was completed with [employer], the computer voice recognition software. Quite often unanticipated grammatical, syntax, homophones, and other interpretive errors are inadvertently transcribed by the computer software. Please disregard these errors. Please excuse any errors that have escaped final proofreading. This is the Subsequent Medicare Annual Wellness Exam, performed 12 months or more after the Initial AWV or the last Subsequent AWV    I have reviewed the patient's medical history in detail and updated the computerized patient record. Assessment/Plan   Education and counseling provided:  Are appropriate based on today's review and evaluation    1. Mantle cell lymphoma, unspecified body region ([facility] [address]  2. Essential hypertension  3. Pure hypercholesterolemia  4. Gastroesophageal reflux disease, unspecified whether esophagitis present  5. Medicare annual wellness visit, subsequent       Depression Risk Factor Screening     3 most recent PHQ Screens 8/22/2022   Little interest or pleasure in doing things Not at all   Feeling down, depressed, irritable, or hopeless Not at all   Total Score PHQ 2 0       Alcohol & Drug Abuse Risk Screen    Do you average more than 1 drink per night or more than 7 drinks a week: No    In the past three months have you have had more than 4 drinks containing alcohol on one occasion: No          Functional Ability and Level of Safety    Hearing: Hearing is good. Activities of Daily Living:   The home contains: no safety equipment. Patient does total self care      Ambulation: with no difficulty     Fall Risk:  Fall Risk Assessment, last 12 mths 8/22/2022   Able to walk? Yes   Fall in past 12 months? 0   Do you feel unsteady?  0   Are you worried about falling 0      Abuse Screen:  Patient is not abused       Cognitive Screening    Has your family/caregiver stated any concerns about your memory: no     Cognitive Screening: Normal - Verbal Fluency Test    Health Maintenance Due     Health Maintenance Due   Topic Date Due    Shingrix Vaccine Age 49> (1 of 2) Never done    DTaP/Tdap/Td series (1 - Tdap) Never done    Pneumococcal 65+ years (2 - PPSV23 or PCV20) 09/26/2019    COVID-19 Vaccine (3 - Pfizer risk series) 04/19/2021       Patient Care Team   Patient Care Team:  Ashtyn Cota MD as PCP - General (Internal Medicine Physician)  Ashtyn Cota MD as PCP - [facility] Empaneled Provider  Harlan Segal MD (Urology)  Erna Conway MD (Hematology and Oncology)    History     Patient Active Problem List   Diagnosis Code    Sepsis, unspecified A41.9    Cellulitis, neck L03.221    HTN (hypertension) I10    Prostatism N40.0    Colon polyp K63.5    Narcolepsy G47.419    HNP (herniated nucleus pulposus) [id number]    ED (erectile dysfunction) of organic origin N52.9    Hypokalemia E87.6    Mantle cell lymphoma ([facility] [address] C83.10    Hyperlipidemia E78.5    Cellulitis of neck L03.221    Prostate cancer ([facility] [address] C61    Chronic rhinitis J31.0    Benign non-nodular prostatic hyperplasia without lower urinary tract symptoms N40.0    Tendonitis, bicipital, right M75.21     Past Medical History:   Diagnosis Date    Chronic rhinitis 08/21/2017    ED (erectile dysfunction) of organic origin 08/21/2017    GERD (gastroesophageal reflux disease)     History of cellulitis 08/21/2017    of neck    History of colon polyps 08/21/2017    History of prostate cancer     prostate - surgery/lymphoma    Hyperlipidemia 08/21/2017    Hypertension Hypokalemia 08/21/2017    Mantle cell lymphoma ([facility] [address] 08/21/2017    Narcolepsy 08/21/2017      Past Surgical History:   Procedure Laterality Date    COLONOSCOPY N/A 7/3/2019    COLONOSCOPY performed by Nicole Trujillo MD at [facility] ENDOSCOPY    HX OTHER SURGICAL      surgery for lymphoma - abdomen    HX PROSTATECTOMY       Current Outpatient Medications   Medication Sig Dispense Refill    propranoloL (INDERAL) 40 mg tablet Take 1 Tablet by mouth two (2) times a day for 360 days. 180 Tablet 3    amLODIPine (NORVASC) 10 mg tablet Take 1 Tablet by mouth daily for 360 days. 90 Tablet 3    potassium chloride (K-DUR, KLOR-CON M20) 20 mEq tablet TAKE 1  BY MOUTH THREE TIMES DAILY 270 Tablet 3     Allergies   Allergen Reactions    Lisinopril Cough       Family History   Problem Relation Age of Onset    Cancer Maternal Uncle         prostate    Stroke Maternal Uncle     Stroke Maternal Grandmother      Social History     Tobacco Use    Smoking status: Never    Smokeless tobacco: Never   Substance Use Topics    Alcohol use: No         Tonya Harmon MD         Follow-up and Dispositions    Return in about 6 months (around 2/22/2023), or if symptoms worsen or fail to improve.

## 2022-08-22 ENCOUNTER — OFFICE VISIT (OUTPATIENT)
Dept: INTERNAL MEDICINE CLINIC | Age: 67
End: 2022-08-22
Payer: COMMERCIAL

## 2022-08-22 VITALS
HEIGHT: 67 IN | BODY MASS INDEX: 25.52 KG/M2 | HEART RATE: 67 BPM | SYSTOLIC BLOOD PRESSURE: 121 MMHG | TEMPERATURE: 98 F | OXYGEN SATURATION: 98 % | WEIGHT: 162.6 LBS | RESPIRATION RATE: 16 BRPM | DIASTOLIC BLOOD PRESSURE: 84 MMHG

## 2022-08-22 DIAGNOSIS — K21.9 GASTROESOPHAGEAL REFLUX DISEASE, UNSPECIFIED WHETHER ESOPHAGITIS PRESENT: ICD-10-CM

## 2022-08-22 DIAGNOSIS — C83.10 MANTLE CELL LYMPHOMA, UNSPECIFIED BODY REGION (HCC): Primary | ICD-10-CM

## 2022-08-22 DIAGNOSIS — R73.03 PREDIABETES: ICD-10-CM

## 2022-08-22 DIAGNOSIS — C61 PROSTATE CANCER (HCC): ICD-10-CM

## 2022-08-22 DIAGNOSIS — E78.00 PURE HYPERCHOLESTEROLEMIA: ICD-10-CM

## 2022-08-22 DIAGNOSIS — Z00.00 MEDICARE ANNUAL WELLNESS VISIT, SUBSEQUENT: ICD-10-CM

## 2022-08-22 DIAGNOSIS — I10 ESSENTIAL HYPERTENSION: ICD-10-CM

## 2022-08-22 PROCEDURE — 1123F ACP DISCUSS/DSCN MKR DOCD: CPT | Performed by: INTERNAL MEDICINE

## 2022-08-22 PROCEDURE — 99214 OFFICE O/P EST MOD 30 MIN: CPT | Performed by: INTERNAL MEDICINE

## 2022-08-22 PROCEDURE — G0439 PPPS, SUBSEQ VISIT: HCPCS | Performed by: INTERNAL MEDICINE

## 2022-08-22 RX ORDER — ATORVASTATIN CALCIUM 10 MG/1
10 TABLET, FILM COATED ORAL DAILY
Qty: 90 TABLET | Refills: 3 | Status: SHIPPED | OUTPATIENT
Start: 2022-08-22 | End: 2022-08-24 | Stop reason: DRUGHIGH

## 2022-08-22 NOTE — PATIENT INSTRUCTIONS
Atorvastatin (By mouth)   Atorvastatin (a-tor-va-STAT-in)  Treats high cholesterol and triglyceride levels. Reduces the risk of angina, stroke, heart attack, or certain heart and blood vessel problems. This medicine is a statin. Brand Name(s): Lipitor   There may be other brand names for this medicine. When This Medicine Should Not Be Used: This medicine is not right for everyone. Do not use it if you had an allergic reaction to atorvastatin, if you have active liver disease, or if you are pregnant or breastfeeding. How to Use This Medicine:   Tablet  Take your medicine as directed. Your dose may need to be changed several times to find what works best for you. Take this medicine at the same time each day. Swallow the tablet whole. Do not break it. Missed dose: Take a dose as soon as you remember. If it is less than 12 hours until your next dose, skip the missed dose and take the next dose at the regular time. Do not take 2 doses of this medicine within 12 hours. Read and follow the patient instructions that come with this medicine. Talk to your doctor or pharmacist if you have any questions. Store the medicine in a closed container at room temperature, away from heat, moisture, and direct light. Drugs and Foods to Avoid:   Ask your doctor or pharmacist before using any other medicine, including over-the-counter medicines, vitamins, and herbal products. Some medicines can affect how atorvastatin works. Tell your doctor if you also use birth control pills, boceprevir, cimetidine, colchicine, cyclosporine, digoxin, niacin, rifampin, spironolactone, telaprevir, medicine to treat an infection, or medicine to treat HIV/AIDS. Warnings While Using This Medicine: It is not safe to take this medicine during pregnancy. It could harm an unborn baby. Tell your doctor right away if you become pregnant.   Tell your doctor if you have kidney disease, diabetes, muscle pain or weakness, thyroid problems, have recently had a stroke or TIA (transient ischemic attack), or have a history of liver disease. Tell your doctor if you drink grapefruit juice or drink alcohol regularly. This medicine can cause muscle problems, which can lead to kidney problems. Tell any doctor or dentist who treats you that you use this medicine. You may need to stop using it if you have surgery, have an injury, or develop serious health problems. Your doctor will do lab tests at regular visits to check on the effects of this medicine. Keep all appointments. Keep all medicine out of the reach of children. Never share your medicine with anyone. Possible Side Effects While Using This Medicine:   Call your doctor right away if you notice any of these side effects: Allergic reaction: Itching or hives, swelling in your face or hands, swelling or tingling in your mouth or throat, chest tightness, trouble breathing  Blistering, peeling, red skin rash  Change in how much or how often you urinate  Dark urine or pale stools, nausea, vomiting, loss of appetite, stomach pain, yellow skin or eyes  Fever  Muscle pain, tenderness, or weakness  Unusual tiredness  If you notice these less serious side effects, talk with your doctor:   Diarrhea  Joint pain  If you notice other side effects that you think are caused by this medicine, tell your doctor. Call your doctor for medical advice about side effects. You may report side effects to FDA at 6-574-FDA-2492  © 2017 [facility] Information is for End User's use only and may not be sold, redistributed or otherwise used for commercial purposes. The above information is an  only. It is not intended as medical advice for individual conditions or treatments. Talk to your doctor, nurse or pharmacist before following any medical regimen to see if it is safe and effective for you.        Gastroesophageal Reflux Disease (GERD): Care Instructions  Overview     Gastroesophageal reflux disease (GERD) is the backward flow of stomach acid into the esophagus. The esophagus is the tube that leads from your throat to your stomach. A one-way valve prevents the stomach acid from backing up into this tube. But when you have GERD, this valve does not close tightly enough. This can also cause pain and swelling in your esophagus. (This is called esophagitis.)  If you have mild GERD symptoms including heartburn, you may be able to control the problem with antacids or over-the-counter medicine. You can also make lifestyle changes to help reduce your symptoms. These include changing your diet and eating habits, such as not eating late at night and losing weight. Follow-up care is a key part of your treatment and safety. Be sure to make and go to all appointments, and call your doctor if you are having problems. It's also a good idea to know your test results and keep a list of the medicines you take. How can you care for yourself at home? Take your medicines exactly as prescribed. Call your doctor if you think you are having a problem with your medicine. Your doctor may recommend over-the-counter medicine. For mild or occasional indigestion, antacids, such as Tums, Gaviscon, Mylanta, or Maalox, may help. Your doctor also may recommend over-the-counter acid reducers, such as famotidine (Pepcid AC), cimetidine (Tagamet HB), or omeprazole (Prilosec). Read and follow all instructions on the label. If you use these medicines often, talk with your doctor. Change your eating habits. It's best to eat several small meals instead of two or three large meals. After you eat, wait 2 to 3 hours before you lie down. Avoid foods that make your symptoms worse. These may include chocolate, mint, alcohol, pepper, spicy foods, high-fat foods, or drinks with caffeine in them, such as tea, coffee, evelin, or energy drinks.  If your symptoms are worse after you eat a certain food, you may want to stop eating it to see if your symptoms get better. Do not smoke or chew tobacco. Smoking can make GERD worse. If you need help quitting, talk to your doctor about stop-smoking programs and medicines. These can increase your chances of quitting for good. If you have GERD symptoms at night, raise the head of your bed 6 to 8 inches by putting the frame on blocks or placing a foam wedge under the head of your mattress. (Adding extra pillows does not work.)  Do not wear tight clothing around your middle. Lose weight if you need to. Losing just 5 to 10 pounds can help. When should you call for help? Call your doctor now or seek immediate medical care if:    You have new or different belly pain. Your stools are black and tarlike or have streaks of blood. Watch closely for changes in your health, and be sure to contact your doctor if:    Your symptoms have not improved after 2 days. Food seems to catch in your throat or chest.   Where can you learn more? Go to http://www.gray.com/  Enter M173 in the search box to learn more about \"Gastroesophageal Reflux Disease (GERD): Care Instructions. \"  Current as of: September 8, 2021               Content Version: 13.2  © 8904-1844 [employer]. Care instructions adapted under license by [employer] (which disclaims liability or warranty for this information). If you have questions about a medical condition or this instruction, always ask your healthcare professional. [place] [address] any warranty or liability for your use of this information. Medicare Wellness Visit, Male    The best way to live healthy is to have a lifestyle where you eat a well-balanced diet, exercise regularly, limit alcohol use, and quit all forms of tobacco/nicotine, if applicable. Regular preventive services are another way to keep healthy.  Preventive services (vaccines, screening tests, monitoring & exams) can help personalize your care plan, which helps you manage your own care. Screening tests can find health problems at the earliest stages, when they are easiest to treat. [facility] follows the current, evidence-based guidelines published by the [facility] Raad Duron ([facility]) when recommending preventive services for our patients. Because we follow these guidelines, sometimes recommendations change over time as research supports it. (For example, a prostate screening blood test is no longer routinely recommended for men with no symptoms). Of course, you and your doctor may decide to screen more often for some diseases, based on your risk and co-morbidities (chronic disease you are already diagnosed with). Preventive services for you include:  - Medicare offers their members a free annual wellness visit, which is time for you and your primary care provider to discuss and plan for your preventive service needs. Take advantage of this benefit every year!  -All adults over age 72 should receive the recommended pneumonia vaccines. Current USPSTF guidelines recommend a series of two vaccines for the best pneumonia protection.   -All adults should have a flu vaccine yearly and tetanus vaccine every 10 years.  -All adults age 48 and older should receive the shingles vaccines (series of two vaccines). -All adults age 38-68 who are overweight should have a diabetes screening test once every three years.   -Other screening tests & preventive services for persons with diabetes include: an eye exam to screen for diabetic retinopathy, a kidney function test, a foot exam, and stricter control over your cholesterol.   -Cardiovascular screening for adults with routine risk involves an electrocardiogram (ECG) at intervals determined by the provider.   -Colorectal cancer screening should be done for adults age 54-65 with no increased risk factors for colorectal cancer.   There are a number of acceptable methods of screening for this type of cancer. Each test has its own benefits and drawbacks. Discuss with your provider what is most appropriate for you during your annual wellness visit. The different tests include: colonoscopy (considered the best screening method), a fecal occult blood test, a fecal DNA test, and sigmoidoscopy.  -All adults born between [facility] should be screened once for Hepatitis C.  -An Abdominal Aortic Aneurysm (AAA) Screening is recommended for men age 73-68 who has ever smoked in their lifetime.      Here is a list of your current Health Maintenance items (your personalized list of preventive services) with a due date:  Health Maintenance Due   Topic Date Due    Shingles Vaccine (1 of 2) Never done    DTaP/Tdap/Td  (1 - Tdap) Never done    Pneumococcal Vaccine (2 - PPSV23 or PCV20) 09/26/2019    COVID-19 Vaccine (3 - Pfizer risk series) 04/19/2021

## 2022-08-22 NOTE — PROGRESS NOTES
Chief Complaint   Patient presents with    Establish Care     Visit Vitals  /84 (BP 1 Location: Left upper arm, BP Patient Position: Sitting, BP Cuff Size: Adult)   Pulse 67   Temp 98 °F (36.7 °C) (Oral)   Resp 16   Ht 5' 7\" (1.702 m)   Wt 162 lb 9.6 oz (73.8 kg)   SpO2 98%   BMI 25.47 kg/m²       1. \"Have you been to the ER, urgent care clinic since your last visit? Hospitalized since your last visit? \" No    2. \"Have you seen or consulted any other health care providers outside of the [address] [place] [address] since your last visit? \" No     3. For patients aged 39-70: Has the patient had a colonoscopy / FIT/ Cologuard? No      If the patient is female:    4. For patients aged 41-77: Has the patient had a mammogram within the past 2 years? No      5. For patients aged 21-65: Has the patient had a pap smear?  No

## 2022-08-23 LAB
ALBUMIN SERPL-MCNC: 3.3 G/DL (ref 3.5–5)
ALBUMIN/GLOB SERPL: 0.8 {RATIO} (ref 1.1–2.2)
ALP SERPL-CCNC: 113 U/L (ref 45–117)
ALT SERPL-CCNC: 33 U/L (ref 12–78)
ANION GAP SERPL CALC-SCNC: 6 MMOL/L (ref 5–15)
AST SERPL-CCNC: 17 U/L (ref 15–37)
BASOPHILS # BLD: 0 K/UL (ref 0–0.1)
BASOPHILS NFR BLD: 0 % (ref 0–1)
BILIRUB SERPL-MCNC: 0.4 MG/DL (ref 0.2–1)
BUN SERPL-MCNC: 15 MG/DL (ref 6–20)
BUN/CREAT SERPL: 12 (ref 12–20)
CALCIUM SERPL-MCNC: 9.1 MG/DL (ref 8.5–10.1)
CHLORIDE SERPL-SCNC: 106 MMOL/L (ref 97–108)
CHOLEST SERPL-MCNC: 178 MG/DL
CO2 SERPL-SCNC: 28 MMOL/L (ref 21–32)
COMMENT, HOLDF: NORMAL
CREAT SERPL-MCNC: 1.22 MG/DL (ref 0.7–1.3)
CREAT UR-MCNC: 202 MG/DL
DIFFERENTIAL METHOD BLD: NORMAL
EOSINOPHIL # BLD: 0.3 K/UL (ref 0–0.4)
EOSINOPHIL NFR BLD: 6 % (ref 0–7)
ERYTHROCYTE [DISTWIDTH] IN BLOOD BY AUTOMATED COUNT: 13 % (ref 11.5–14.5)
EST. AVERAGE GLUCOSE BLD GHB EST-MCNC: 120 MG/DL
GLOBULIN SER CALC-MCNC: 4.2 G/DL (ref 2–4)
GLUCOSE SERPL-MCNC: 180 MG/DL (ref 65–100)
HBA1C MFR BLD: 5.8 % (ref 4–5.6)
HCT VFR BLD AUTO: 40.7 % (ref 36.6–50.3)
HDLC SERPL-MCNC: 36 MG/DL
HDLC SERPL: 4.9 {RATIO} (ref 0–5)
HGB BLD-MCNC: 12.7 G/DL (ref 12.1–17)
IMM GRANULOCYTES # BLD AUTO: 0 K/UL (ref 0–0.04)
IMM GRANULOCYTES NFR BLD AUTO: 0 % (ref 0–0.5)
LDLC SERPL CALC-MCNC: 120.6 MG/DL (ref 0–100)
LYMPHOCYTES # BLD: 1.7 K/UL (ref 0.8–3.5)
LYMPHOCYTES NFR BLD: 34 % (ref 12–49)
MCH RBC QN AUTO: 28.3 PG (ref 26–34)
MCHC RBC AUTO-ENTMCNC: 31.2 G/DL (ref 30–36.5)
MCV RBC AUTO: 90.8 FL (ref 80–99)
MICROALBUMIN UR-MCNC: 0.64 MG/DL
MICROALBUMIN/CREAT UR-RTO: 3 MG/G (ref 0–30)
MONOCYTES # BLD: 0.3 K/UL (ref 0–1)
MONOCYTES NFR BLD: 6 % (ref 5–13)
NEUTS SEG # BLD: 2.6 K/UL (ref 1.8–8)
NEUTS SEG NFR BLD: 54 % (ref 32–75)
NRBC # BLD: 0 K/UL (ref 0–0.01)
NRBC BLD-RTO: 0 PER 100 WBC
PLATELET # BLD AUTO: 262 K/UL (ref 150–400)
PMV BLD AUTO: 10.1 FL (ref 8.9–12.9)
POTASSIUM SERPL-SCNC: 4 MMOL/L (ref 3.5–5.1)
PROT SERPL-MCNC: 7.5 G/DL (ref 6.4–8.2)
PSA SERPL-MCNC: 0.3 NG/ML (ref 0.01–4)
RBC # BLD AUTO: 4.48 M/UL (ref 4.1–5.7)
SAMPLES BEING HELD,HOLD: NORMAL
SODIUM SERPL-SCNC: 140 MMOL/L (ref 136–145)
TRIGL SERPL-MCNC: 107 MG/DL (ref ?–150)
VLDLC SERPL CALC-MCNC: 21.4 MG/DL
WBC # BLD AUTO: 4.9 K/UL (ref 4.1–11.1)

## 2022-08-24 DIAGNOSIS — E78.00 PURE HYPERCHOLESTEROLEMIA: Primary | ICD-10-CM

## 2022-08-24 RX ORDER — ATORVASTATIN CALCIUM 10 MG/1
10 TABLET, FILM COATED ORAL
Qty: 90 TABLET | Refills: 3 | Status: SHIPPED | OUTPATIENT
Start: 2022-08-24 | End: 2023-08-19

## 2022-08-24 NOTE — PROGRESS NOTES
10-year Cardiovascular Risk Pipo Bernadette, 2013): The 10-year ASCVD risk score (Royce Figueroa, et al., 2013) is: 17.7%    Values used to calculate the score:      Age: 69 years      Sex: Male      Is Non- : Yes      Diabetic: No      Tobacco smoker: No      Systolic Blood Pressure: 462 mmHg      Is BP treated: Yes      HDL Cholesterol: 36 MG/DL      Total Cholesterol: 178 MG/DL       ICD-10-CM ICD-9-CM    1.  Pure hypercholesterolemia  E78.00 272.0 atorvastatin (LIPITOR) 10 mg tablet

## 2022-08-24 NOTE — PROGRESS NOTES
Please call the patient regarding his diagnostic evaluation. Stable prediabetes. Stable elevated cholesterol. He should continue with his current atorvastatin 10 mg/d as previously started.

## 2022-08-25 LAB
H PYLORI AG STL QL IA: NEGATIVE
SPECIMEN SOURCE: NORMAL

## 2022-08-26 NOTE — PROGRESS NOTES
Please call the patient regarding his diagnostic evaluation. H pylori negative. If he is still having GERD symptoms a trial of PPI would be reasonable. Can try OTC pantoprazole or omeprazole.

## 2022-09-13 ENCOUNTER — TRANSCRIBE ORDER (OUTPATIENT)
Dept: SCHEDULING | Age: 67
End: 2022-09-13

## 2022-09-13 DIAGNOSIS — Z85.46 PERSONAL HISTORY OF MALIGNANT NEOPLASM OF PROSTATE: Primary | ICD-10-CM

## 2022-09-13 DIAGNOSIS — R59.0 VIRCHOW'S NODE: ICD-10-CM

## 2022-09-13 DIAGNOSIS — R19.09 GROIN MASS: ICD-10-CM

## 2022-09-13 DIAGNOSIS — C83.16: ICD-10-CM

## 2022-09-23 ENCOUNTER — HOSPITAL ENCOUNTER (OUTPATIENT)
Dept: PET IMAGING | Age: 67
Discharge: HOME | End: 2022-09-23
Attending: INTERNAL MEDICINE
Payer: COMMERCIAL

## 2022-09-23 VITALS — HEIGHT: 67 IN | BODY MASS INDEX: 24.64 KG/M2 | WEIGHT: 157 LBS

## 2022-09-23 DIAGNOSIS — R59.0 VIRCHOW'S NODE: ICD-10-CM

## 2022-09-23 DIAGNOSIS — R19.09 GROIN MASS: ICD-10-CM

## 2022-09-23 DIAGNOSIS — Z85.46 PERSONAL HISTORY OF MALIGNANT NEOPLASM OF PROSTATE: ICD-10-CM

## 2022-09-23 DIAGNOSIS — C83.16: ICD-10-CM

## 2022-09-23 LAB
GLUCOSE BLD STRIP.AUTO-MCNC: 105 MG/DL (ref 65–117)
SERVICE CMNT-IMP: NORMAL

## 2022-09-23 PROCEDURE — A9552 F18 FDG: HCPCS

## 2022-09-23 RX ORDER — FLUDEOXYGLUCOSE F-18 200 MCI/ML
10 INJECTION INTRAVENOUS ONCE
Status: COMPLETED | OUTPATIENT
Start: 2022-09-23 | End: 2022-09-23

## 2022-09-23 RX ADMIN — FLUDEOXYGLUCOSE F-18 9.8 MILLICURIE: 200 INJECTION INTRAVENOUS at 07:13

## 2023-01-05 NOTE — PATIENT INSTRUCTIONS

## 2023-02-24 NOTE — PROGRESS NOTES
ICD-10-CM ICD-9-CM    1. Mantle cell lymphoma, unspecified body region ([facility]  C83.10 200.40       2. Essential hypertension  J01 409.5 METABOLIC PANEL, COMPREHENSIVE      METABOLIC PANEL, COMPREHENSIVE      3. Pure hypercholesterolemia  E78.00 272.0 LIPID PANEL      LIPID PANEL      4. Prediabetes  R73.03 790.29 HEMOGLOBIN A1C WITH EAG      HEMOGLOBIN A1C WITH EAG      5. Nocturnal leg cramps  G47.62 327.52       6. Hypokalemia  E87.6 276.8 MAGNESIUM      MAGNESIUM               Subjective:     Chief Complaint   Patient presents with    Follow-up       Zac Cooper is a 77 y.o. M.  he  has a past medical history of Chronic rhinitis (08/21/2017), ED (erectile dysfunction) of organic origin (08/21/2017), GERD (gastroesophageal reflux disease), History of cellulitis (08/21/2017), History of colon polyps (08/21/2017), History of prostate cancer, Hyperlipidemia (08/21/2017), Hypertension, Hypokalemia (08/21/2017), Mantle cell lymphoma ([facility] (08/21/2017), Narcolepsy (08/21/2017), and Prediabetes. He has no past medical history of Unspecified adverse effect of anesthesia or Unspecified sleep apnea. his last appointment in this clinic was 8/22/2022 . I reviewed and updated the medical record. At this patient's most recent appointment with me in late August 2022, the patient had reported feeling generally well. He was following with hematology/oncology for his history of mantle cell lymphoma as well as with urology for his history of prostate cancer. He had complained of some increasing amounts of heartburn progressive over the previous year. Physical examination at the time had been generally unremarkable. For his heartburn, he was referred for H. pylori stool testing. He was started on atorvastatin for hypercholesterolemia given his elevated cardiovascular risk.   Follow-up CT imaging for his history of lymphoma had demonstrated hypermetabolic lymphadenopathy, consistent with his mantle cell lymphoma. Today, the patient comes in for routine follow-up on his chronic medical concerns. He has had some intermittent nocturnal toe cramping at night, but otherwise has felt well. Hypercholesterolemia: He continues on atorvastatin 10 mg daily for this. His last LDL was around 120 mg/dL when checked back in the summer. He denies having had any other significant muscle aches or pains, or any other GI symptoms. He has no personal history of any cardiovascular or cerebrovascular disease. Hypertension: He continues on propranolol for this as well as amlodipine. He reports tolerating his medication combination well without any lightheadedness or dizziness. No recent chest pain, shortness breath, or any further cardiopulmonary concerns. History of mantle cell lymphoma: He continues to be followed by oncology. Unfortunately, I do not have the results of his most recent visit in this system to review. She reports no recent fevers or chills, weight changes, or worsening adenopathy. He has pending follow-up with oncology and he is strongly urged to maintain this follow-up to determine neck steps in terms of treatment. His review of systems is otherwise negative. Routine Healthcare Maintenance issues are reviewed and discussed with the patient as noted below. Orders to update gaps in healthcare maintenance were placed as noted below in the Assessment and Plan, where applicable.      Past Medical History:  Past Medical History:   Diagnosis Date    Chronic rhinitis 08/21/2017    ED (erectile dysfunction) of organic origin 08/21/2017    GERD (gastroesophageal reflux disease)     History of cellulitis 08/21/2017    of neck    History of colon polyps 08/21/2017    History of prostate cancer     prostate - surgery/lymphoma    Hyperlipidemia 08/21/2017    Hypertension     Hypokalemia 08/21/2017    Mantle cell lymphoma ([facility] 08/21/2017    Narcolepsy 08/21/2017    Prediabetes        Past Surgical Histor:  Past Surgical History:   Procedure Laterality Date    COLONOSCOPY N/A 7/3/2019    COLONOSCOPY performed by Kayley Powell MD at hospitals ENDOSCOPY    HX OTHER SURGICAL      surgery for lymphoma - abdomen    HX PROSTATECTOMY         Allergies: Allergies   Allergen Reactions    Lisinopril Cough       Medications:  Current Outpatient Medications   Medication Sig Dispense Refill    atorvastatin (LIPITOR) 10 mg tablet Take 1 Tablet by mouth nightly for 360 days. 90 Tablet 3    propranoloL (INDERAL) 40 mg tablet Take 1 Tablet by mouth two (2) times a day for 360 days. 180 Tablet 3    amLODIPine (NORVASC) 10 mg tablet Take 1 Tablet by mouth daily for 360 days.  90 Tablet 3    potassium chloride (K-DUR, KLOR-CON M20) 20 mEq tablet TAKE 1  BY MOUTH THREE TIMES DAILY 270 Tablet 3       Social History:  Social History     Socioeconomic History    Marital status:    Occupational History    Occupation: [employer]     Employer: [employer]   Tobacco Use    Smoking status: Never    Smokeless tobacco: Never   Vaping Use    Vaping Use: Never used   Substance and Sexual Activity    Alcohol use: No    Drug use: No    Sexual activity: Yes     Partners: Female       Family History:  Family History   Problem Relation Age of Onset    Cancer Maternal Uncle         prostate    Stroke Maternal Uncle     Stroke Maternal Grandmother        Immunizations:  Immunization History   Administered Date(s) Administered    COVID-19, PFIZER PURPLE top, DILUTE for use, (age 15 y+), IM, 30mcg/0.3mL 03/01/2021, 03/22/2021    Influenza Vaccine 10/01/2020    Influenza Vaccine (Tri) Adjuvanted (>65 Yrs FLUAD TRI [id number]) 09/26/2018    Influenza Vaccine Split 09/05/2012    Pneumococcal Conjugate (PCV-13) 09/26/2018        Healthcare Maintenance:  Health Maintenance   Topic Date Due    Shingles Vaccine (1 of 2) Never done    DTaP/Tdap/Td series (1 - Tdap) Never done    Pneumococcal 65+ years (2 - PPSV23 if available, else PCV20) 09/26/2019 COVID-19 Vaccine (3 - Pfizer risk series) 04/19/2021    Flu Vaccine (1) 08/01/2022    Depression Screen  08/22/2023    A1C test (Diabetic or Prediabetic)  08/22/2023    Lipid Screen  08/22/2023    Medicare Yearly Exam  08/23/2023    Colorectal Cancer Screening Combo  07/03/2024    Hepatitis C Screening  Completed        Review of Systems:  ROS:  Review of Systems   Constitutional: Negative. HENT: Negative. Eyes: Negative. Respiratory: Negative. Cardiovascular: Negative. Gastrointestinal: Negative. Genitourinary: Negative. Musculoskeletal: Negative. Skin: Negative. Neurological: Negative. Endo/Heme/Allergies: Negative. Psychiatric/Behavioral: Negative. ROS otherwise negative      Objective:     Vital Signs:  Visit Vitals  /82 (BP 1 Location: Left upper arm, BP Patient Position: Sitting, BP Cuff Size: Adult)   Pulse 68   Resp 16   Ht 5' 7\" (1.702 m)   Wt 162 lb 12.8 oz (73.8 kg)   SpO2 98%   BMI 25.50 kg/m²       BMI:  Body mass index is 25.5 kg/m². Physical Examination:  Physical Exam  Vitals reviewed. Constitutional:       Appearance: Normal appearance. He is normal weight. HENT:      Head: Normocephalic and atraumatic. Nose: Nose normal.      Mouth/Throat:      Mouth: Mucous membranes are moist.   Eyes:      Extraocular Movements: Extraocular movements intact. Conjunctiva/sclera: Conjunctivae normal.      Pupils: Pupils are equal, round, and reactive to light. Cardiovascular:      Rate and Rhythm: Normal rate and regular rhythm. Pulses: Normal pulses. Heart sounds: Normal heart sounds. No murmur heard. No friction rub. No gallop. Pulmonary:      Effort: Pulmonary effort is normal. No respiratory distress. Breath sounds: Normal breath sounds. No wheezing, rhonchi or rales. Abdominal:      General: Bowel sounds are normal. There is no distension. Palpations: Abdomen is soft. There is no mass. Tenderness:  There is no abdominal tenderness. There is no guarding or rebound. Musculoskeletal:         General: No tenderness, deformity or signs of injury. Normal range of motion. Cervical back: Normal range of motion and neck supple. Right lower leg: No edema. Left lower leg: No edema. Skin:     General: Skin is warm and dry. Findings: No bruising, lesion or rash. Neurological:      General: No focal deficit present. Mental Status: He is alert and oriented to person, place, and time. Mental status is at baseline. Cranial Nerves: No cranial nerve deficit. Sensory: No sensory deficit. Motor: No weakness. Coordination: Coordination normal.      Gait: Gait normal.      Deep Tendon Reflexes: Reflexes normal.   Psychiatric:         Mood and Affect: Mood normal.         Behavior: Behavior normal.        Physical exam otherwise negative    Diagnostic Testing:    Laboratory Studies:  Hospital Outpatient Visit on 09/23/2022   Component Date Value Ref Range Status    Glucose (POC) 09/23/2022 105  65 - 117 mg/dL Final    Comment: (NOTE)  The FDA has indicated that no capillary point of care blood glucose  monitoring systems are approved for use in \"critically ill\" patients,  however they have not defined this population. The College of  American Pathologists has recommended that these devices should not  be used in cases such as severe hypotension, dehydration, shock, and  hyperglycemic-hyperosmolar state, amongst others. Venous or arterial  collection is the recommended specimen for testing these patients.       Performed by 09/23/2022 Krystal Shah   Final       Lab Results   Component Value Date/Time    Cholesterol, total 178 08/22/2022 09:12 AM    Cholesterol (POC) 184.0 10/09/2017 03:30 PM    HDL Cholesterol 36 08/22/2022 09:12 AM    HDL Cholesterol (POC) 45.0 10/09/2017 03:30 PM    LDL Cholesterol (POC) 124.4 10/09/2017 03:30 PM    LDL, calculated 120.6 (H) 08/22/2022 09:12 AM    VLDL, calculated 21.4 08/22/2022 09:12 AM    Triglyceride 107 08/22/2022 09:12 AM    Triglycerides (POC) 73.0 10/09/2017 03:30 PM    CHOL/HDL Ratio 4.9 08/22/2022 09:12 AM       Lab Results   Component Value Date/Time    Hemoglobin A1c 5.8 (H) 08/22/2022 09:12 AM       Lab Results   Component Value Date/Time    GFR est AA >60 08/22/2022 09:12 AM    GFR est non-AA 59 (L) 08/22/2022 09:12 AM    Creatinine (POC) 1.1 10/09/2017 03:30 PM    Creatinine 1.22 08/22/2022 09:12 AM    BUN 15 08/22/2022 09:12 AM    BUN 14.0 10/09/2017 03:30 PM    Sodium (POC) 145.0 10/09/2017 03:30 PM    Sodium 140 08/22/2022 09:12 AM    Potassium 4.0 08/22/2022 09:12 AM    Potassium (POC) 4.6 10/09/2017 03:30 PM    CHLORIDE 105.0 10/09/2017 03:30 PM    Chloride 106 08/22/2022 09:12 AM    CO2 28 08/22/2022 09:12 AM       Lab Results   Component Value Date/Time    ALT (SGPT) 33 08/22/2022 09:12 AM    AST (SGOT) 17 08/22/2022 09:12 AM    Alk. phosphatase 113 08/22/2022 09:12 AM    Bilirubin, total 0.4 08/22/2022 09:12 AM         Radiographic Studies:  XR Results (most recent):  No results found for this or any previous visit. SIDNEY Results (most recent):  No results found for this or any previous visit. CT Results (most recent):  Results from Abstract encounter on 05/20/19    CT CHEST ABD PELV W CONT     DEXA Results (most recent):  No results found for this or any previous visit. MRI Results (most recent):  No results found for this or any previous visit. Assessment/Plan:       ICD-10-CM ICD-9-CM    1. Mantle cell lymphoma, unspecified body region ([facility]  C83.10 200.40       2. Essential hypertension  Y82 720.0 METABOLIC PANEL, COMPREHENSIVE      METABOLIC PANEL, COMPREHENSIVE      3. Pure hypercholesterolemia  E78.00 272.0 LIPID PANEL      LIPID PANEL      4. Prediabetes  R73.03 790.29 HEMOGLOBIN A1C WITH EAG      HEMOGLOBIN A1C WITH EAG      5. Nocturnal leg cramps  G47.62 327.52       6.  Hypokalemia  E87.6 276.8 MAGNESIUM MAGNESIUM             History of mantle cell lymphoma:  - With hypermetabolic activity noted on recent PET/CT. I do not have the results of his recent evaluation from oncology to review; he is strongly urged to maintain follow-up with oncology determine neck steps in terms of treatment. Healthcare Maintenance:  - Preventive measures are reviewed as per above  - Up to date on routine interventions except as noted above  - Orders placed to update gaps as noted  - Notes: UTD      Diabetes Mellitus:   - Type: prediabetes   - Current A1C:   Lab Results   Component Value Date/Time    Hemoglobin A1c 5.8 (H) 08/22/2022 09:12 AM       - Target R9E: <0%   - Complications: none   - Relevant Diabetes Medications:  Key Antihyperglycemic Medications       Patient is on no antihyperglycemic meds. - General Recommendations discussed today: ---   - Notes: rechecking A1C        Essential Hypertension/Blood Pressure Management:   - Home BP Readings: not doing   - Current Control: optimal   - Target BP: <130/80 mmHg   - Relevant BP Meds:  Key CAD CHF Meds               atorvastatin (LIPITOR) 10 mg tablet (Taking) Take 1 Tablet by mouth nightly for 360 days. propranoloL (INDERAL) 40 mg tablet (Taking) Take 1 Tablet by mouth two (2) times a day for 360 days. amLODIPine (NORVASC) 10 mg tablet (Taking) Take 1 Tablet by mouth daily for 360 days.           - Plan: continue current meds, dietary sodium restriction, regular aerobic exercise   - Notes: CCM      Hyperlipidemia/Dyslipidemia:   - Summary of Cardiovascular Risks and Goals:     LDL goal is under 100  hypertension  hyperlipidemia  [place] 10-year risk 10-20%   -   Lab Results   Component Value Date/Time    LDL, calculated 120.6 (H) 08/22/2022 09:12 AM    HDL Cholesterol 36 08/22/2022 09:12 AM       - Relevant Cholesterol Meds:  Key Antihyperlipidemia Meds               atorvastatin (LIPITOR) 10 mg tablet (Taking) Take 1 Tablet by mouth nightly for 360 days. - Cholesterol at target: no   - Does patient meet USPSTF and ACC/AHA indications for pharmacotherapy (e.g., statin): yes   - GI symptoms with meds: NO   - Muscle aches with meds: NO   - Other Adverse effects with meds: NO   - Medication Plan: continue   - Notes: Repeat labs pending      I have reviewed the patient's medical history in detail and updated the computerized patient record. We had a prolonged discussion about these complex clinical issues and went over the various important aspects to consider. All questions were answered. Advised the patient to call back or return to office if symptoms do not improve, change in nature, or persist.     The patient was given an after visit summary or informed of [employer] [facility] which includes patient instructions, diagnoses, current medications, & vitals. he expressed understanding with the diagnosis and plan. Joana Thayer MD    Please note that this dictation was completed with [employer], the computer voice recognition software. Quite often unanticipated grammatical, syntax, homophones, and other interpretive errors are inadvertently transcribed by the computer software. Please disregard these errors. Please excuse any errors that have escaped final proofreading.

## 2023-02-28 ENCOUNTER — OFFICE VISIT (OUTPATIENT)
Dept: INTERNAL MEDICINE CLINIC | Age: 68
End: 2023-02-28
Payer: MEDICARE

## 2023-02-28 VITALS
RESPIRATION RATE: 16 BRPM | DIASTOLIC BLOOD PRESSURE: 82 MMHG | WEIGHT: 162.8 LBS | HEART RATE: 68 BPM | BODY MASS INDEX: 25.55 KG/M2 | SYSTOLIC BLOOD PRESSURE: 122 MMHG | HEIGHT: 67 IN | OXYGEN SATURATION: 98 %

## 2023-02-28 DIAGNOSIS — G47.62 NOCTURNAL LEG CRAMPS: ICD-10-CM

## 2023-02-28 DIAGNOSIS — R73.03 PREDIABETES: ICD-10-CM

## 2023-02-28 DIAGNOSIS — E78.00 PURE HYPERCHOLESTEROLEMIA: ICD-10-CM

## 2023-02-28 DIAGNOSIS — C83.10 MANTLE CELL LYMPHOMA, UNSPECIFIED BODY REGION (HCC): Primary | ICD-10-CM

## 2023-02-28 DIAGNOSIS — E87.6 HYPOKALEMIA: ICD-10-CM

## 2023-02-28 DIAGNOSIS — I10 ESSENTIAL HYPERTENSION: ICD-10-CM

## 2023-02-28 LAB
ALBUMIN SERPL-MCNC: 3.6 G/DL (ref 3.5–5)
ALBUMIN/GLOB SERPL: 0.9 (ref 1.1–2.2)
ALP SERPL-CCNC: 115 U/L (ref 45–117)
ALT SERPL-CCNC: 49 U/L (ref 12–78)
ANION GAP SERPL CALC-SCNC: 3 MMOL/L (ref 5–15)
AST SERPL-CCNC: 29 U/L (ref 15–37)
BILIRUB SERPL-MCNC: 0.6 MG/DL (ref 0.2–1)
BUN SERPL-MCNC: 14 MG/DL (ref 6–20)
BUN/CREAT SERPL: 10 (ref 12–20)
CALCIUM SERPL-MCNC: 9.4 MG/DL (ref 8.5–10.1)
CHLORIDE SERPL-SCNC: 107 MMOL/L (ref 97–108)
CHOLEST SERPL-MCNC: 129 MG/DL
CO2 SERPL-SCNC: 31 MMOL/L (ref 21–32)
CREAT SERPL-MCNC: 1.35 MG/DL (ref 0.7–1.3)
EST. AVERAGE GLUCOSE BLD GHB EST-MCNC: 114 MG/DL
GLOBULIN SER CALC-MCNC: 4.2 G/DL (ref 2–4)
GLUCOSE SERPL-MCNC: 110 MG/DL (ref 65–100)
HBA1C MFR BLD: 5.6 % (ref 4–5.6)
HDLC SERPL-MCNC: 40 MG/DL
HDLC SERPL: 3.2 (ref 0–5)
LDLC SERPL CALC-MCNC: 78.2 MG/DL (ref 0–100)
MAGNESIUM SERPL-MCNC: 2.3 MG/DL (ref 1.6–2.4)
POTASSIUM SERPL-SCNC: 4.3 MMOL/L (ref 3.5–5.1)
PROT SERPL-MCNC: 7.8 G/DL (ref 6.4–8.2)
SODIUM SERPL-SCNC: 141 MMOL/L (ref 136–145)
TRIGL SERPL-MCNC: 54 MG/DL (ref ?–150)
VLDLC SERPL CALC-MCNC: 10.8 MG/DL

## 2023-02-28 PROCEDURE — 99214 OFFICE O/P EST MOD 30 MIN: CPT | Performed by: INTERNAL MEDICINE

## 2023-02-28 PROCEDURE — G8510 SCR DEP NEG, NO PLAN REQD: HCPCS | Performed by: INTERNAL MEDICINE

## 2023-02-28 PROCEDURE — 3017F COLORECTAL CA SCREEN DOC REV: CPT | Performed by: INTERNAL MEDICINE

## 2023-02-28 PROCEDURE — 1101F PT FALLS ASSESS-DOCD LE1/YR: CPT | Performed by: INTERNAL MEDICINE

## 2023-02-28 PROCEDURE — 3074F SYST BP LT 130 MM HG: CPT | Performed by: INTERNAL MEDICINE

## 2023-02-28 PROCEDURE — 3079F DIAST BP 80-89 MM HG: CPT | Performed by: INTERNAL MEDICINE

## 2023-02-28 PROCEDURE — G8536 NO DOC ELDER MAL SCRN: HCPCS | Performed by: INTERNAL MEDICINE

## 2023-02-28 PROCEDURE — 1123F ACP DISCUSS/DSCN MKR DOCD: CPT | Performed by: INTERNAL MEDICINE

## 2023-02-28 PROCEDURE — G8417 CALC BMI ABV UP PARAM F/U: HCPCS | Performed by: INTERNAL MEDICINE

## 2023-02-28 PROCEDURE — G8427 DOCREV CUR MEDS BY ELIG CLIN: HCPCS | Performed by: INTERNAL MEDICINE

## 2023-02-28 NOTE — PROGRESS NOTES
Chief Complaint   Patient presents with    Follow-up     Visit Vitals  /82 (BP 1 Location: Left upper arm, BP Patient Position: Sitting, BP Cuff Size: Adult)   Pulse 68   Resp 16   Ht 5' 7\" (1.702 m)   Wt 162 lb 12.8 oz (73.8 kg)   SpO2 98%   BMI 25.50 kg/m²       1. \"Have you been to the ER, urgent care clinic since your last visit? Hospitalized since your last visit? \" No    2. \"Have you seen or consulted any other health care providers outside of the [address] [place] [address] since your last visit? \" No     3. For patients aged 39-70: Has the patient had a colonoscopy / FIT/ Cologuard? No      If the patient is female:    4. For patients aged 41-77: Has the patient had a mammogram within the past 2 years? No      5. For patients aged 21-65: Has the patient had a pap smear?  No

## 2023-03-01 NOTE — PROGRESS NOTES
Please call the patient regarding his diagnostic evaluation. Stable chronic kidney disease, renal function overall stable, A1c shows interval improvement in prediabetes, cholesterol at target. Continue with current care.

## 2023-03-02 NOTE — PROGRESS NOTES
Patient was informed about lab results Prior Authorization Not Needed per Insurance    Medication: emgality 120 mg every 28 days  Insurance Company: [employer] - Phone [phone] Fax [phone]   Pharmacy Filling the Rx: [facility] - [place], [place] - [address]  Pharmacy Notified:  Yes- relayed response back from insurance that pa is not needed, and that if after calling their help desk number they are still having problems to call me back

## 2023-07-10 ENCOUNTER — TELEPHONE (OUTPATIENT)
Facility: CLINIC | Age: 68
End: 2023-07-10

## 2023-07-10 NOTE — TELEPHONE ENCOUNTER
amLODIPine (NORVASC) 10 mg tablet 90 Tablet 0 5/31/2022 5/26/2023    Sig - Route:  Take 1 Tablet by mouth daily for 360 days. - Oral      Last visit: 2/28/23 Dr. Sony Zabala  Future visit: 8/30/23 NP [address]: Andrea Erickson

## 2023-07-11 NOTE — TELEPHONE ENCOUNTER
Requested Prescriptions     Pending Prescriptions Disp Refills    amLODIPine (NORVASC) 10 MG tablet 90 tablet 1     Sig: Take 1 tablet by mouth daily         RX refill request from the patient/pharmacy. Patient last seen 2/28/23 with labs, and next appt. scheduled for 8/30/23.

## 2023-07-12 RX ORDER — AMLODIPINE BESYLATE 10 MG/1
10 TABLET ORAL DAILY
Qty: 90 TABLET | Refills: 0 | Status: SHIPPED | OUTPATIENT
Start: 2023-07-12 | End: 2024-07-06

## 2023-08-30 ENCOUNTER — OFFICE VISIT (OUTPATIENT)
Facility: CLINIC | Age: 68
End: 2023-08-30
Payer: MEDICARE

## 2023-08-30 VITALS
WEIGHT: 156.9 LBS | HEIGHT: 67 IN | DIASTOLIC BLOOD PRESSURE: 80 MMHG | BODY MASS INDEX: 24.63 KG/M2 | RESPIRATION RATE: 16 BRPM | TEMPERATURE: 97.6 F | OXYGEN SATURATION: 98 % | SYSTOLIC BLOOD PRESSURE: 120 MMHG | HEART RATE: 62 BPM

## 2023-08-30 DIAGNOSIS — G47.429 NARCOLEPSY DUE TO UNDERLYING CONDITION WITHOUT CATAPLEXY: ICD-10-CM

## 2023-08-30 DIAGNOSIS — I10 PRIMARY HYPERTENSION: ICD-10-CM

## 2023-08-30 DIAGNOSIS — R20.8 DECREASED SENSATION OF FOOT: ICD-10-CM

## 2023-08-30 DIAGNOSIS — C83.10 MANTLE CELL LYMPHOMA, UNSPECIFIED BODY REGION (HCC): Primary | ICD-10-CM

## 2023-08-30 DIAGNOSIS — N52.9 ED (ERECTILE DYSFUNCTION) OF ORGANIC ORIGIN: ICD-10-CM

## 2023-08-30 DIAGNOSIS — C61 PROSTATE CANCER (HCC): ICD-10-CM

## 2023-08-30 DIAGNOSIS — N40.0 BENIGN NON-NODULAR PROSTATIC HYPERPLASIA WITHOUT LOWER URINARY TRACT SYMPTOMS: ICD-10-CM

## 2023-08-30 DIAGNOSIS — R73.03 PREDIABETES: ICD-10-CM

## 2023-08-30 LAB
ALBUMIN SERPL-MCNC: 3.6 G/DL (ref 3.5–5)
ALBUMIN/GLOB SERPL: 0.8 (ref 1.1–2.2)
ALP SERPL-CCNC: 125 U/L (ref 45–117)
ALT SERPL-CCNC: 29 U/L (ref 12–78)
ANION GAP SERPL CALC-SCNC: 2 MMOL/L (ref 5–15)
APPEARANCE UR: CLEAR
AST SERPL-CCNC: 30 U/L (ref 15–37)
BACTERIA URNS QL MICRO: NEGATIVE /HPF
BASOPHILS # BLD: 0.1 K/UL (ref 0–0.1)
BASOPHILS NFR BLD: 1 % (ref 0–1)
BILIRUB SERPL-MCNC: 0.5 MG/DL (ref 0.2–1)
BILIRUB UR QL: NEGATIVE
BUN SERPL-MCNC: 24 MG/DL (ref 6–20)
BUN/CREAT SERPL: 18 (ref 12–20)
CALCIUM SERPL-MCNC: 9 MG/DL (ref 8.5–10.1)
CHLORIDE SERPL-SCNC: 109 MMOL/L (ref 97–108)
CHOLEST SERPL-MCNC: 144 MG/DL
CO2 SERPL-SCNC: 29 MMOL/L (ref 21–32)
COLOR UR: NORMAL
CREAT SERPL-MCNC: 1.34 MG/DL (ref 0.7–1.3)
DIFFERENTIAL METHOD BLD: ABNORMAL
EOSINOPHIL # BLD: 0.4 K/UL (ref 0–0.4)
EOSINOPHIL NFR BLD: 5 % (ref 0–7)
EPITH CASTS URNS QL MICRO: NORMAL /LPF
ERYTHROCYTE [DISTWIDTH] IN BLOOD BY AUTOMATED COUNT: 13 % (ref 11.5–14.5)
EST. AVERAGE GLUCOSE BLD GHB EST-MCNC: 117 MG/DL
GLOBULIN SER CALC-MCNC: 4.3 G/DL (ref 2–4)
GLUCOSE SERPL-MCNC: 107 MG/DL (ref 65–100)
GLUCOSE UR STRIP.AUTO-MCNC: NEGATIVE MG/DL
HBA1C MFR BLD: 5.7 % (ref 4–5.6)
HCT VFR BLD AUTO: 41.3 % (ref 36.6–50.3)
HDLC SERPL-MCNC: 43 MG/DL
HDLC SERPL: 3.3 (ref 0–5)
HGB BLD-MCNC: 12.7 G/DL (ref 12.1–17)
HGB UR QL STRIP: NEGATIVE
HYALINE CASTS URNS QL MICRO: NORMAL /LPF (ref 0–5)
IMM GRANULOCYTES # BLD AUTO: 0 K/UL (ref 0–0.04)
IMM GRANULOCYTES NFR BLD AUTO: 0 % (ref 0–0.5)
KETONES UR QL STRIP.AUTO: NEGATIVE MG/DL
LDLC SERPL CALC-MCNC: 90.6 MG/DL (ref 0–100)
LEUKOCYTE ESTERASE UR QL STRIP.AUTO: NEGATIVE
LYMPHOCYTES # BLD: 3.8 K/UL (ref 0.8–3.5)
LYMPHOCYTES NFR BLD: 49 % (ref 12–49)
MCH RBC QN AUTO: 27.5 PG (ref 26–34)
MCHC RBC AUTO-ENTMCNC: 30.8 G/DL (ref 30–36.5)
MCV RBC AUTO: 89.4 FL (ref 80–99)
MONOCYTES # BLD: 0.3 K/UL (ref 0–1)
MONOCYTES NFR BLD: 5 % (ref 5–13)
NEUTS SEG # BLD: 3 K/UL (ref 1.8–8)
NEUTS SEG NFR BLD: 40 % (ref 32–75)
NITRITE UR QL STRIP.AUTO: NEGATIVE
NRBC # BLD: 0 K/UL (ref 0–0.01)
NRBC BLD-RTO: 0 PER 100 WBC
PH UR STRIP: 6.5 (ref 5–8)
PLATELET # BLD AUTO: 201 K/UL (ref 150–400)
PMV BLD AUTO: 10.3 FL (ref 8.9–12.9)
POTASSIUM SERPL-SCNC: 4.5 MMOL/L (ref 3.5–5.1)
PROT SERPL-MCNC: 7.9 G/DL (ref 6.4–8.2)
PROT UR STRIP-MCNC: NEGATIVE MG/DL
RBC # BLD AUTO: 4.62 M/UL (ref 4.1–5.7)
RBC #/AREA URNS HPF: NORMAL /HPF (ref 0–5)
SODIUM SERPL-SCNC: 140 MMOL/L (ref 136–145)
SP GR UR REFRACTOMETRY: 1.02 (ref 1–1.03)
TRIGL SERPL-MCNC: 52 MG/DL
URINE CULTURE IF INDICATED: NORMAL
UROBILINOGEN UR QL STRIP.AUTO: 1 EU/DL (ref 0.2–1)
VLDLC SERPL CALC-MCNC: 10.4 MG/DL
WBC # BLD AUTO: 7.6 K/UL (ref 4.1–11.1)
WBC URNS QL MICRO: NORMAL /HPF (ref 0–4)

## 2023-08-30 PROCEDURE — 3074F SYST BP LT 130 MM HG: CPT | Performed by: NURSE PRACTITIONER

## 2023-08-30 PROCEDURE — 99214 OFFICE O/P EST MOD 30 MIN: CPT | Performed by: NURSE PRACTITIONER

## 2023-08-30 PROCEDURE — 3079F DIAST BP 80-89 MM HG: CPT | Performed by: NURSE PRACTITIONER

## 2023-08-30 PROCEDURE — 1123F ACP DISCUSS/DSCN MKR DOCD: CPT | Performed by: NURSE PRACTITIONER

## 2023-08-30 ASSESSMENT — ENCOUNTER SYMPTOMS
RECTAL PAIN: 0
SORE THROAT: 0
PHOTOPHOBIA: 0
BACK PAIN: 1
COLOR CHANGE: 0
SINUS PAIN: 0
STRIDOR: 0
FACIAL SWELLING: 0
VOMITING: 0
CONSTIPATION: 0
APNEA: 0
DIARRHEA: 0
EYE DISCHARGE: 0
NAUSEA: 0
COUGH: 0
CHOKING: 0
EYE PAIN: 0
ANAL BLEEDING: 0
SINUS PRESSURE: 0
EYE ITCHING: 0
TROUBLE SWALLOWING: 0
ABDOMINAL PAIN: 0
BLOOD IN STOOL: 0
WHEEZING: 0

## 2023-08-30 NOTE — PROGRESS NOTES
Chief Complaint   Patient presents with    New Patient     New to provider establish care, 6 month f/up     1. Have you been to the ER, urgent care clinic since your last visit? Hospitalized since your last visit? No    2. Have you seen or consulted any other health care providers outside of the [address] [place] [address] Avenue since your last visit? Include any pap smears or colon screening.  No
start B12 1000 mg SL daily. Increase hydration- at least 64 oz daily. RTO in 4 months, sooner if needed. No results found for any visits on 08/30/23. No follow-ups on file. An electronic signature was used to authenticate this note.   -- Danna Valle, JOLANTA - NP

## 2023-08-31 LAB
FOLATE SERPL-MCNC: 18.7 NG/ML (ref 5–21)
TSH SERPL DL<=0.05 MIU/L-ACNC: 1.03 UIU/ML (ref 0.36–3.74)
VIT B12 SERPL-MCNC: 977 PG/ML (ref 193–986)

## 2023-09-07 ENCOUNTER — TELEPHONE (OUTPATIENT)
Facility: CLINIC | Age: 68
End: 2023-09-07

## 2023-09-14 ENCOUNTER — TELEPHONE (OUTPATIENT)
Facility: CLINIC | Age: 68
End: 2023-09-14

## 2023-09-14 NOTE — TELEPHONE ENCOUNTER
Spoke with patient about his lab results, two patient identifiers verified. Patient also asked about the referral for the podiatrist that was put him for him, I gave the patient there information so he could schedule an appointment with them. Patient verbalized understanding.

## 2023-09-18 RX ORDER — POTASSIUM CHLORIDE 20 MEQ/1
TABLET, EXTENDED RELEASE ORAL
Qty: 90 TABLET | Refills: 2 | Status: SHIPPED | OUTPATIENT
Start: 2023-09-18

## 2023-09-18 RX ORDER — AMLODIPINE BESYLATE 10 MG/1
10 TABLET ORAL DAILY
Qty: 90 TABLET | Refills: 0 | Status: SHIPPED | OUTPATIENT
Start: 2023-09-18

## 2023-09-18 NOTE — TELEPHONE ENCOUNTER
Pharmacy: [id number] [facility]       potassium chloride (K-DUR, KLOR-CON M20) 20 mEq tablet 270 Tablet 3 5/31/2022    Sig: TAKE 1  BY MOUTH THREE TIMES DAILY    propranolol (INDERAL) 40 MG tablet [[id number]]  ENDED    Order Details  Dose: -- Route: Oral Frequency: 2 TIMES DAILY   Dispense Quantity: -- Refills: --          Sig: Take by mouth 2 times daily

## 2023-09-18 NOTE — TELEPHONE ENCOUNTER
PCP: JOLANTA Escalona NP    Last appt: 8/30/2023  Future Appointments   Date Time Provider [address]   1/29/2024  8:30 AM JOLANTA Escalona NP [facility] AMB       Requested Prescriptions     Pending Prescriptions Disp Refills    amLODIPine (NORVASC) 10 MG tablet [Pharmacy Med Name: amLODIPine Besylate 10 MG Oral Tablet] 90 tablet 0     Sig: Take 1 tablet by mouth once daily       Prior labs and Blood pressures:  BP Readings from Last 3 Encounters:   08/30/23 120/80   02/28/23 122/82   08/22/22 121/84     Lab Results   Component Value Date/Time     08/30/2023 09:04 AM    K 4.5 08/30/2023 09:04 AM     08/30/2023 09:04 AM    CO2 29 08/30/2023 09:04 AM    BUN 24 08/30/2023 09:04 AM    GFRAA >60 08/22/2022 09:12 AM     No results found for: \"HBA1C\", \"[id number]\"  Lab Results   Component Value Date/Time    CHOL 144 08/30/2023 09:04 AM    HDL 43 08/30/2023 09:04 AM    VLDL 14 08/17/2021 08:48 AM    VLDL 17 07/01/2020 08:41 AM     No results found for: \"VITD3\", \"VD3RIA\"        Lab Results   Component Value Date/Time    TSH 1.650 08/17/2021 08:48 AM

## 2023-09-18 NOTE — TELEPHONE ENCOUNTER
PCP: JOLANTA Graham NP    Last appt: 8/30/2023  Future Appointments   Date Time Provider [address]   1/29/2024  8:30 AM JOLANTA Graham NP [facility] AM[facility]       Requested Prescriptions     Pending Prescriptions Disp Refills    potassium chloride (KLOR-CON M) 20 MEQ extended release tablet 60 tablet        Prior labs and Blood pressures:  BP Readings from Last 3 Encounters:   08/30/23 120/80   02/28/23 122/82   08/22/22 121/84     Lab Results   Component Value Date/Time     08/30/2023 09:04 AM    K 4.5 08/30/2023 09:04 AM     08/30/2023 09:04 AM    CO2 29 08/30/2023 09:04 AM    BUN 24 08/30/2023 09:04 AM    GFRAA >60 08/22/2022 09:12 AM     No results found for: \"HBA1C\", \"[id number]\"  Lab Results   Component Value Date/Time    CHOL 144 08/30/2023 09:04 AM    HDL 43 08/30/2023 09:04 AM    VLDL 14 08/17/2021 08:48 AM    VLDL 17 07/01/2020 08:41 AM     No results found for: \"VITD3\", \"VD3RIA\"        Lab Results   Component Value Date/Time    TSH 1.650 08/17/2021 08:48 AM

## 2023-09-20 NOTE — TELEPHONE ENCOUNTER
Pharmacy: [facility] [address] [facility] [address]        Disp Refills Start End   propranoloL (INDERAL) 40 mg tablet 180 Tablet 3 5/31/2022 5/26/2023   Sig - Route:  Take 1 Tablet by mouth two (2) times a day for 360 days. - Oral

## 2023-09-20 NOTE — TELEPHONE ENCOUNTER
PCP: JOLANTA Tran NP    Last appt: 8/30/2023  Future Appointments   Date Time Provider [address]   1/29/2024  8:30 AM JOLANTA Tran NP [facility]       Requested Prescriptions     Pending Prescriptions Disp Refills    propranolol (INDERAL) 40 MG tablet 90 tablet      Sig: Take by mouth 2 times daily       Prior labs and Blood pressures:  BP Readings from Last 3 Encounters:   08/30/23 120/80   02/28/23 122/82   08/22/22 121/84     Lab Results   Component Value Date/Time     08/30/2023 09:04 AM    K 4.5 08/30/2023 09:04 AM     08/30/2023 09:04 AM    CO2 29 08/30/2023 09:04 AM    BUN 24 08/30/2023 09:04 AM    GFRAA >60 08/22/2022 09:12 AM     No results found for: \"HBA1C\", \"[id number]\"  Lab Results   Component Value Date/Time    CHOL 144 08/30/2023 09:04 AM    HDL 43 08/30/2023 09:04 AM    VLDL 14 08/17/2021 08:48 AM    VLDL 17 07/01/2020 08:41 AM     No results found for: \"VITD3\", \"VD3RIA\"        Lab Results   Component Value Date/Time    TSH 1.650 08/17/2021 08:48 AM

## 2023-09-21 RX ORDER — PROPRANOLOL HYDROCHLORIDE 40 MG/1
40 TABLET ORAL 2 TIMES DAILY
Qty: 180 TABLET | Refills: 1 | Status: SHIPPED | OUTPATIENT
Start: 2023-09-21 | End: 2024-12-20

## 2024-01-03 RX ORDER — AMLODIPINE BESYLATE 10 MG/1
10 TABLET ORAL DAILY
Qty: 90 TABLET | Refills: 0 | Status: SHIPPED | OUTPATIENT
Start: 2024-01-03

## 2024-01-03 NOTE — TELEPHONE ENCOUNTER
Requested Prescriptions     Pending Prescriptions Disp Refills    amLODIPine (NORVASC) 10 MG tablet [Pharmacy Med Name: amLODIPine Besylate 10 MG Oral Tablet] 90 tablet 0     Sig: Take 1 tablet by mouth once daily       RX refill request from the patient/pharmacy. Patient last seen 8/30/23 with labs, and next appt. scheduled for 1/29/24.

## 2024-01-15 RX ORDER — ATORVASTATIN CALCIUM 10 MG/1
10 TABLET, FILM COATED ORAL DAILY
Qty: 90 TABLET | Refills: 1 | Status: SHIPPED | OUTPATIENT
Start: 2024-01-15 | End: 2025-01-20

## 2024-01-15 NOTE — TELEPHONE ENCOUNTER
Pharmacy: [facility] [place]      atorvastatin (LIPITOR) 10 MG tablet [[id number]]      Order Details  Dose: -- Route: Oral Frequency: --   Dispense Quantity: 90 Refills: --          Sig: Take 1 tablet by mouth nightly

## 2024-01-15 NOTE — TELEPHONE ENCOUNTER
PCP: Jose G Banerjee APRN - NP    Last appt: 8/30/2023    Future Appointments   Date Time Provider Department Center   1/29/2024  8:30 AM Jose G Banerjee APRN - NP [facility]       Requested Prescriptions     Pending Prescriptions Disp Refills    atorvastatin (LIPITOR) 10 MG tablet 90 tablet 1     Sig: Take 1 tablet by mouth daily

## 2024-04-01 ENCOUNTER — TELEPHONE (OUTPATIENT)
Facility: CLINIC | Age: 69
End: 2024-04-01

## 2024-04-01 RX ORDER — AMLODIPINE BESYLATE 10 MG/1
10 TABLET ORAL DAILY
Qty: 90 TABLET | Refills: 0 | Status: SHIPPED | OUTPATIENT
Start: 2024-04-01

## 2024-04-01 NOTE — TELEPHONE ENCOUNTER
PCP: No primary care provider on file.    Last appt: 8/30/2023    No future appointments.    Requested Prescriptions     Pending Prescriptions Disp Refills    amLODIPine (NORVASC) 10 MG tablet [Pharmacy Med Name: amLODIPine Besylate 10 MG Oral Tablet] 90 tablet 0     Sig: Take 1 tablet by mouth once daily

## 2024-04-17 NOTE — TELEPHONE ENCOUNTER
propranolol (INDERAL) 40 MG tablet 180 tablet 0 9/21/2023 12/20/2024    Sig - Route: Take 1 tablet by mouth 2 times daily - Oral        Last visit 8/30/23  Patient is on the list for Dr. Patel    Pharmacy [facility]

## 2024-04-18 RX ORDER — POTASSIUM CHLORIDE 20 MEQ/1
TABLET, EXTENDED RELEASE ORAL
Qty: 90 TABLET | Refills: 2 | Status: SHIPPED | OUTPATIENT
Start: 2024-04-18

## 2024-04-18 RX ORDER — PROPRANOLOL HYDROCHLORIDE 40 MG/1
40 TABLET ORAL 2 TIMES DAILY
Qty: 180 TABLET | Refills: 1 | Status: SHIPPED | OUTPATIENT
Start: 2024-04-18 | End: 2025-07-18

## 2024-04-18 NOTE — TELEPHONE ENCOUNTER
potassium chloride (KLOR-CON M) 20 MEQ extended release tablet 90 tablet 2 9/18/2023 --    Sig: TAKE 1  BY MOUTH THREE TIMES DAILY        Last visit 8/30/23 JIMMIE Banerjee  Patient is on the list for Dr. Patel    Pharmacy [facility]

## 2024-04-18 NOTE — TELEPHONE ENCOUNTER
PCP: No primary care provider on file.    Last appt: 8/30/2023  No future appointments.    Requested Prescriptions     Pending Prescriptions Disp Refills    propranolol (INDERAL) 40 MG tablet 180 tablet 1     Sig: Take 1 tablet by mouth 2 times daily       Prior labs and Blood pressures:  BP Readings from Last 3 Encounters:   08/30/23 120/80   02/28/23 122/82   08/22/22 121/84     Lab Results   Component Value Date/Time     08/30/2023 09:04 AM    K 4.5 08/30/2023 09:04 AM     08/30/2023 09:04 AM    CO2 29 08/30/2023 09:04 AM    BUN 24 08/30/2023 09:04 AM    GFRAA >60 08/22/2022 09:12 AM     No results found for: \"HBA1C\", \"[id number]\"  Lab Results   Component Value Date/Time    CHOL 144 08/30/2023 09:04 AM    HDL 43 08/30/2023 09:04 AM    VLDL 14 08/17/2021 08:48 AM    VLDL 17 07/01/2020 08:41 AM     No results found for: \"VITD3\", \"VD3RIA\"        Lab Results   Component Value Date/Time    TSH 1.650 08/17/2021 08:48 AM

## 2024-04-18 NOTE — TELEPHONE ENCOUNTER
PCP: No primary care provider on file.    Last appt: 8/30/2023  No future appointments.    Requested Prescriptions     Pending Prescriptions Disp Refills    potassium chloride (KLOR-CON M) 20 MEQ extended release tablet 90 tablet 2     Sig: TAKE 1  BY MOUTH THREE TIMES DAILY       Prior labs and Blood pressures:  BP Readings from Last 3 Encounters:   08/30/23 120/80   02/28/23 122/82   08/22/22 121/84     Lab Results   Component Value Date/Time     08/30/2023 09:04 AM    K 4.5 08/30/2023 09:04 AM     08/30/2023 09:04 AM    CO2 29 08/30/2023 09:04 AM    BUN 24 08/30/2023 09:04 AM    GFRAA >60 08/22/2022 09:12 AM     No results found for: \"HBA1C\", \"[id number]\"  Lab Results   Component Value Date/Time    CHOL 144 08/30/2023 09:04 AM    HDL 43 08/30/2023 09:04 AM    VLDL 14 08/17/2021 08:48 AM    VLDL 17 07/01/2020 08:41 AM     No results found for: \"VITD3\", \"VD3RIA\"        Lab Results   Component Value Date/Time    TSH 1.650 08/17/2021 08:48 AM

## 2024-07-10 NOTE — TELEPHONE ENCOUNTER
PCP: No primary care provider on file.    Last appt: 8/30/2023    No future appointments.    Requested Prescriptions     Pending Prescriptions Disp Refills    atorvastatin (LIPITOR) 10 MG tablet 90 tablet 1     Sig: Take 1 tablet by mouth daily

## 2024-07-10 NOTE — TELEPHONE ENCOUNTER
atorvastatin (LIPITOR) 10 MG tablet 90 tablet 0 1/15/2024 1/20/2025    Sig - Route: Take 1 tablet by mouth daily - Oral      Last visit 8/30/24  Patient on list to see Dr. Patel    [facility]

## 2024-07-11 RX ORDER — ATORVASTATIN CALCIUM 10 MG/1
10 TABLET, FILM COATED ORAL DAILY
Qty: 90 TABLET | Refills: 1 | Status: SHIPPED | OUTPATIENT
Start: 2024-07-11 | End: 2025-07-17

## 2024-07-22 RX ORDER — AMLODIPINE BESYLATE 10 MG/1
10 TABLET ORAL DAILY
Qty: 90 TABLET | Refills: 0 | Status: SHIPPED | OUTPATIENT
Start: 2024-07-22

## 2024-07-22 NOTE — TELEPHONE ENCOUNTER
PCP: No primary care provider on file.    Last appt: Visit date not found    No future appointments. Patient on the list to see Dr. Patel      Requested Prescriptions     Pending Prescriptions Disp Refills    amLODIPine (NORVASC) 10 MG tablet [Pharmacy Med Name: amLODIPine Besylate 10 MG Oral Tablet] 90 tablet 0     Sig: Take 1 tablet by mouth once daily

## 2024-08-26 ENCOUNTER — OFFICE VISIT (OUTPATIENT)
Facility: CLINIC | Age: 69
End: 2024-08-26
Payer: MEDICARE

## 2024-08-26 VITALS
DIASTOLIC BLOOD PRESSURE: 72 MMHG | WEIGHT: 149.6 LBS | RESPIRATION RATE: 16 BRPM | BODY MASS INDEX: 23.48 KG/M2 | HEART RATE: 72 BPM | HEIGHT: 67 IN | TEMPERATURE: 98.2 F | SYSTOLIC BLOOD PRESSURE: 124 MMHG | OXYGEN SATURATION: 96 %

## 2024-08-26 DIAGNOSIS — C83.10 MANTLE CELL LYMPHOMA, UNSPECIFIED BODY REGION (HCC): ICD-10-CM

## 2024-08-26 DIAGNOSIS — R06.6 HICCUPS: Primary | ICD-10-CM

## 2024-08-26 PROCEDURE — 1036F TOBACCO NON-USER: CPT | Performed by: NURSE PRACTITIONER

## 2024-08-26 PROCEDURE — 3074F SYST BP LT 130 MM HG: CPT | Performed by: NURSE PRACTITIONER

## 2024-08-26 PROCEDURE — 3017F COLORECTAL CA SCREEN DOC REV: CPT | Performed by: NURSE PRACTITIONER

## 2024-08-26 PROCEDURE — 99213 OFFICE O/P EST LOW 20 MIN: CPT | Performed by: NURSE PRACTITIONER

## 2024-08-26 PROCEDURE — G8420 CALC BMI NORM PARAMETERS: HCPCS | Performed by: NURSE PRACTITIONER

## 2024-08-26 PROCEDURE — 1123F ACP DISCUSS/DSCN MKR DOCD: CPT | Performed by: NURSE PRACTITIONER

## 2024-08-26 PROCEDURE — 3078F DIAST BP <80 MM HG: CPT | Performed by: NURSE PRACTITIONER

## 2024-08-26 PROCEDURE — G8427 DOCREV CUR MEDS BY ELIG CLIN: HCPCS | Performed by: NURSE PRACTITIONER

## 2024-08-26 RX ORDER — ACYCLOVIR 400 MG/1
400 TABLET ORAL 2 TIMES DAILY
COMMUNITY
Start: 2024-07-17

## 2024-08-26 RX ORDER — ONDANSETRON 8 MG/1
8 TABLET, ORALLY DISINTEGRATING ORAL EVERY 8 HOURS PRN
COMMUNITY
Start: 2024-06-25

## 2024-08-26 RX ORDER — SULFAMETHOXAZOLE/TRIMETHOPRIM 800-160 MG
1 TABLET ORAL DAILY
COMMUNITY
Start: 2024-07-17

## 2024-08-26 RX ORDER — PROCHLORPERAZINE MALEATE 10 MG
10 TABLET ORAL EVERY 6 HOURS PRN
COMMUNITY
Start: 2024-06-27

## 2024-08-26 RX ORDER — BACLOFEN 5 MG/1
5 TABLET ORAL 3 TIMES DAILY
Qty: 30 TABLET | Refills: 0 | Status: SHIPPED | OUTPATIENT
Start: 2024-08-26 | End: 2024-08-28 | Stop reason: ALTCHOICE

## 2024-08-26 RX ORDER — HYDROCORTISONE 20 MG/1
100 TABLET ORAL DAILY
COMMUNITY

## 2024-08-26 SDOH — ECONOMIC STABILITY: FOOD INSECURITY: WITHIN THE PAST 12 MONTHS, YOU WORRIED THAT YOUR FOOD WOULD RUN OUT BEFORE YOU GOT MONEY TO BUY MORE.: NEVER TRUE

## 2024-08-26 SDOH — ECONOMIC STABILITY: FOOD INSECURITY: WITHIN THE PAST 12 MONTHS, THE FOOD YOU BOUGHT JUST DIDN'T LAST AND YOU DIDN'T HAVE MONEY TO GET MORE.: NEVER TRUE

## 2024-08-26 SDOH — ECONOMIC STABILITY: INCOME INSECURITY: HOW HARD IS IT FOR YOU TO PAY FOR THE VERY BASICS LIKE FOOD, HOUSING, MEDICAL CARE, AND HEATING?: NOT HARD AT ALL

## 2024-08-26 ASSESSMENT — PATIENT HEALTH QUESTIONNAIRE - PHQ9
SUM OF ALL RESPONSES TO PHQ9 QUESTIONS 1 & 2: 0
2. FEELING DOWN, DEPRESSED OR HOPELESS: NOT AT ALL
1. LITTLE INTEREST OR PLEASURE IN DOING THINGS: NOT AT ALL
SUM OF ALL RESPONSES TO PHQ QUESTIONS 1-9: 0

## 2024-08-26 ASSESSMENT — ENCOUNTER SYMPTOMS
BLOOD IN STOOL: 0
SHORTNESS OF BREATH: 0
VOMITING: 0
DIARRHEA: 0
NAUSEA: 0

## 2024-08-26 NOTE — PROGRESS NOTES
[facility] [place]  Office Note  History of present illness:Augusto Rios is a 69 y.o. male presenting for Hiccups (Began 8/22. Burning sensation in the throat)      Mr. Rios is an established patient.  He is here with complaints of hiccups.  Onset 4 days ago after starting new treatment of Bactrim and hydrocortisone Dr. Gamboa's office-[facility] [place].  He reports taking this to help with his blood pressure. Hx of Chemotherapy for lymphoma; history of prostate cancer.  He reports trying: sucking on ice and water, hot tea, tums, throat lozengers.  He states hiccups are worse while just at rest.  It goes away at sleep and when he is talking.  Associated sore throat.  History of GERD      Review of Systems   Constitutional: Negative.    Respiratory:  Negative for shortness of breath.    Cardiovascular:  Negative for chest pain and palpitations.   Gastrointestinal:  Negative for blood in stool, diarrhea, nausea and vomiting.         Past Medical History:   Diagnosis Date    Chronic rhinitis 08/21/2017    ED (erectile dysfunction) of organic origin 08/21/2017    GERD (gastroesophageal reflux disease)     History of cellulitis 08/21/2017    of neck    History of colon polyps 08/21/2017    History of prostate cancer     prostate - surgery/lymphoma    Hyperlipidemia 08/21/2017    Hypertension     Hypokalemia 08/21/2017    Mantle cell lymphoma (HCC) 08/21/2017    Narcolepsy 08/21/2017    Prediabetes        Allergies   Allergen Reactions    Lisinopril Cough        Prior to Admission medications    Medication Sig Start Date End Date Taking? Authorizing Provider   sulfamethoxazole-trimethoprim (BACTRIM DS;SEPTRA DS) 800-160 MG per tablet Take 1 tablet by mouth daily 7/17/24  Yes Provider, Historical, MD   acyclovir (ZOVIRAX) 400 MG tablet Take 1 tablet by mouth 2 times daily 7/17/24  Yes Provider, Historical, MD   prochlorperazine (COMPAZINE) 10 MG tablet Take

## 2024-08-26 NOTE — PROGRESS NOTES
Augusto Rios is a 69 y.o. male     Chief Complaint   Patient presents with    Hiccups     Began 8/22. Burning sensation in the throat       /72 (Site: Left Upper Arm, Position: Sitting, Cuff Size: Medium Adult)   Pulse 72   Temp 98.2 °F (36.8 °C) (Oral)   Resp 16   Ht 1.702 m (5' 7\")   Wt 67.9 kg (149 lb 9.6 oz)   SpO2 96%   BMI 23.43 kg/m²     Health Maintenance Due   Topic Date Due    DTaP/Tdap/Td vaccine (1 - Tdap) Never done    Shingles vaccine (1 of 2) Never done    Respiratory Syncytial Virus (RSV) Pregnant or age 60 yrs+ (1 - 1-dose 60+ series) Never done    Pneumococcal 65+ years Vaccine (2 of 2 - PPSV23 or PCV20) 11/21/2018    COVID-19 Vaccine (3 - Pfizer risk series) 04/19/2021    Prostate Specific Antigen (PSA) Screening or Monitoring  08/22/2023    Annual Wellness Visit (Medicare)  11/27/2023    Depression Screen  02/28/2024    Colorectal Cancer Screen  07/03/2024    Flu vaccine (1) 08/01/2024    A1C test (Diabetic or Prediabetic)  08/30/2024    Lipids  08/30/2024    GFR test (Diabetes, CKD 3-4, OR last GFR 15-59)  08/30/2024         \"Have you been to the ER, urgent care clinic since your last visit?  Hospitalized since your last visit?\"    NO    “Have you seen or consulted any other health care providers outside of [facility] since your last visit?”    NO    “Have you had a colorectal cancer screening such as a colonoscopy/FIT/Cologuard?    NO    Date of last Colonoscopy: 7/3/2019  No cologuard on file  No FIT/FOBT on file   No flexible sigmoidoscopy on file

## 2024-08-28 ENCOUNTER — TELEPHONE (OUTPATIENT)
Facility: CLINIC | Age: 69
End: 2024-08-28

## 2024-08-28 ENCOUNTER — OFFICE VISIT (OUTPATIENT)
Facility: CLINIC | Age: 69
End: 2024-08-28
Payer: MEDICARE

## 2024-08-28 VITALS
HEART RATE: 72 BPM | RESPIRATION RATE: 18 BRPM | WEIGHT: 150.6 LBS | TEMPERATURE: 98.1 F | OXYGEN SATURATION: 98 % | SYSTOLIC BLOOD PRESSURE: 113 MMHG | BODY MASS INDEX: 23.64 KG/M2 | DIASTOLIC BLOOD PRESSURE: 74 MMHG | HEIGHT: 67 IN

## 2024-08-28 DIAGNOSIS — N52.9 ED (ERECTILE DYSFUNCTION) OF ORGANIC ORIGIN: ICD-10-CM

## 2024-08-28 DIAGNOSIS — N18.30 STAGE 3 CHRONIC KIDNEY DISEASE, UNSPECIFIED WHETHER STAGE 3A OR 3B CKD (HCC): ICD-10-CM

## 2024-08-28 DIAGNOSIS — E78.00 PURE HYPERCHOLESTEROLEMIA, UNSPECIFIED: ICD-10-CM

## 2024-08-28 DIAGNOSIS — I10 PRIMARY HYPERTENSION: ICD-10-CM

## 2024-08-28 DIAGNOSIS — Z23 ENCOUNTER FOR ADMINISTRATION OF VACCINE: ICD-10-CM

## 2024-08-28 DIAGNOSIS — Z00.00 MEDICARE ANNUAL WELLNESS VISIT, SUBSEQUENT: Primary | ICD-10-CM

## 2024-08-28 DIAGNOSIS — C83.14 MANTLE CELL LYMPHOMA OF LYMPH NODES OF AXILLA (HCC): ICD-10-CM

## 2024-08-28 DIAGNOSIS — C61 PROSTATE CANCER (HCC): ICD-10-CM

## 2024-08-28 PROCEDURE — 1123F ACP DISCUSS/DSCN MKR DOCD: CPT | Performed by: NURSE PRACTITIONER

## 2024-08-28 PROCEDURE — G8420 CALC BMI NORM PARAMETERS: HCPCS | Performed by: NURSE PRACTITIONER

## 2024-08-28 PROCEDURE — 3078F DIAST BP <80 MM HG: CPT | Performed by: NURSE PRACTITIONER

## 2024-08-28 PROCEDURE — 1036F TOBACCO NON-USER: CPT | Performed by: NURSE PRACTITIONER

## 2024-08-28 PROCEDURE — 90677 PCV20 VACCINE IM: CPT | Performed by: NURSE PRACTITIONER

## 2024-08-28 PROCEDURE — 3074F SYST BP LT 130 MM HG: CPT | Performed by: NURSE PRACTITIONER

## 2024-08-28 PROCEDURE — G8427 DOCREV CUR MEDS BY ELIG CLIN: HCPCS | Performed by: NURSE PRACTITIONER

## 2024-08-28 PROCEDURE — 3017F COLORECTAL CA SCREEN DOC REV: CPT | Performed by: NURSE PRACTITIONER

## 2024-08-28 PROCEDURE — G0009 ADMIN PNEUMOCOCCAL VACCINE: HCPCS | Performed by: NURSE PRACTITIONER

## 2024-08-28 PROCEDURE — 99214 OFFICE O/P EST MOD 30 MIN: CPT | Performed by: NURSE PRACTITIONER

## 2024-08-28 PROCEDURE — G0439 PPPS, SUBSEQ VISIT: HCPCS | Performed by: NURSE PRACTITIONER

## 2024-08-28 ASSESSMENT — LIFESTYLE VARIABLES
HOW MANY STANDARD DRINKS CONTAINING ALCOHOL DO YOU HAVE ON A TYPICAL DAY: PATIENT DOES NOT DRINK
HOW OFTEN DO YOU HAVE A DRINK CONTAINING ALCOHOL: NEVER

## 2024-08-28 NOTE — PROGRESS NOTES
Medicare Annual Wellness Visit    Augusto Rios is here for New Patient, questions about medication, and Medicare AWV    Assessment & Plan   1. Medicare annual wellness visit, subsequent  He believes he is due for colonoscopy.  Advised to schedule after his upcoming imaging for cancer treatment or if advised otherwise by his cancer doctor.  Updated with Prevnar 20 today.  He will receive flu vaccine in clinic.  2. Primary hypertension  -     Comprehensive Metabolic Panel; Future  -     Microalbumin / Creatinine Urine Ratio; Future  Stable.  Continue amlodipine 10 mg daily, propranolol 40 mg twice daily  3. Pure hypercholesterolemia, unspecified  -     Lipid Panel; Future  He will have fasting labs drawn next week.  Continue 10 mg atorvastatin daily  4. 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 Stage 3 chronic kidney disease, unspecified whether stage 3a or 3b CKD (HCC)  -     Comprehensive Metabolic Panel; Future  -     Microalbumin / Creatinine Urine Ratio; Future  Currently, he is not on ACE or ARB.  Labs pending  6. Encounter for administration of vaccine  -     Pneumococcal, PCV20, PREVNAR 20, (age 6w+), IM, PF  7. ED (erectile dysfunction) of organic origin  Managed by urology  8. Prostate cancer (HCC)  Yearly surveillance    Recommendations for Preventive Services Due: see orders and patient instructions/AVS.  Recommended screening schedule for the next 5-10 years is provided to the patient in written form: see Patient Instructions/AVS.     Return in about 4 months (around 12/28/2024) for Hypertension Follow Up, non fasting labs.     Subjective     Mr. Cerda is here today for medical annual wellness and follow-up on chronic medical problems.  He was seen by myself 2 days ago for acute hiccups.  I prescribed Zanaflex and Prilosec and he reports this has resolved his  (36.7 °C)   TempSrc: Temporal   SpO2: 98%   Weight: 68.3 kg (150 lb 9.6 oz)   Height: 1.702 m (5' 7\")      Body mass index is 23.59 kg/m².   Last Weight Metrics:      8/28/2024     2:28 PM 8/26/2024     2:13 PM 8/30/2023     8:16 AM 2/28/2023     8:35 AM 9/23/2022     7:03 AM 8/22/2022     8:29 AM 2/21/2022     8:26 AM   Weight Loss Metrics   Height 5' 7\" 5' 7\" 5' 7\" 5' 7\" 5' 7\" 5' 7\" 5' 7\"   Weight - Scale 150 lbs 10 oz 149 lbs 10 oz 156 lbs 14 oz 162 lbs 13 oz 157 lbs 162 lbs 10 oz 165 lbs   BMI (Calculated) 23.6 kg/m2 23.5 kg/m2 24.6 kg/m2 25.6 kg/m2 24.6 kg/m2 25.5 kg/m2 25.9 kg/m2         General Appearance: alert and oriented to person, place and time, well developed and well- nourished, in no acute distress  Skin: warm and dry, no rash or erythema  Head: normocephalic and atraumatic  Eyes: pupils equal, round, and reactive to light, extraocular eye movements intact, conjunctivae normal  ENT: tympanic membrane, external ear and ear canal normal bilaterally, nose without deformity, nasal mucosa and turbinates normal without polyps  Neck: supple and non-tender without mass, no thyromegaly or thyroid nodules, no cervical lymphadenopathy  Left axilla: Large axillary lymphadenopathy  Pulmonary/Chest: clear to auscultation bilaterally- no wheezes, rales or rhonchi, normal air movement, no respiratory distress  Cardiovascular: normal rate, regular rhythm, normal S1 and S2, no murmurs, rubs, clicks, or gallops, distal pulses intact, no carotid bruits  Abdomen: soft, non-tender, non-distended, normal bowel sounds, no masses or organomegaly  Extremities: no cyanosis, clubbing or edema  Musculoskeletal: normal range of motion, no joint swelling, deformity or tenderness  Neurologic: reflexes normal and symmetric, no cranial nerve deficit, gait, coordination and speech normal            Allergies   Allergen Reactions    Lisinopril Cough     Prior to Visit Medications    Medication Sig Taking? Authorizing Provider

## 2024-08-28 NOTE — PATIENT INSTRUCTIONS
Learning About Being Active as an Older Adult  Why is being active important as you get older?     Being active is one of the best things you can do for your health. And it's never too late to start. Being active--or getting active, if you aren't already--has definite benefits. It can:  Give you more energy,  Keep your mind sharp.  Improve balance to reduce your risk of falls.  Help you manage chronic illness with fewer medicines.  No matter how old you are, how fit you are, or what health problems you have, there is a form of activity that will work for you. And the more physical activity you can do, the better your overall health will be.  What kinds of activity can help you stay healthy?  Being more active will make your daily activities easier. Physical activity includes planned exercise and things you do in daily life. There are four types of activity:  Aerobic.  Doing aerobic activity makes your heart and lungs strong.  Includes walking, dancing, and gardening.  Aim for at least 2½ hours spread throughout the week.  It improves your energy and can help you sleep better.  Muscle-strengthening.  This type of activity can help maintain muscle and strengthen bones.  Includes climbing stairs, using resistance bands, and lifting or carrying heavy loads.  Aim for at least twice a week.  It can help protect the knees and other joints.  Stretching.  Stretching gives you better range of motion in joints and muscles.  Includes upper arm stretches, calf stretches, and gentle yoga.  Aim for at least twice a week, preferably after your muscles are warmed up from other activities.  It can help you function better in daily life.  Balancing.  This helps you stay coordinated and have good posture.  Includes heel-to-toe walking, xu chi, and certain types of yoga.  Aim for at least 3 days a week.  It can reduce your risk of falling.  Even if you have a hard time meeting the recommendations, it's better to be more active

## 2024-08-28 NOTE — TELEPHONE ENCOUNTER
Called @3:44p and left a voicemail for [facility] to give me a call back regarding pt recent notes that NP Nader is requesting. Will follow up.

## 2024-08-28 NOTE — PROGRESS NOTES
Augusto Rios is a 69 y.o. male     Chief Complaint   Patient presents with    New Patient    questions about medication    Medicare AWV       /74 (Site: Left Upper Arm, Position: Sitting, Cuff Size: Medium Adult)   Pulse 72   Temp 98.1 °F (36.7 °C) (Temporal)   Resp 18   Ht 1.702 m (5' 7\")   Wt 68.3 kg (150 lb 9.6 oz)   SpO2 98%   BMI 23.59 kg/m²     Health Maintenance Due   Topic Date Due    DTaP/Tdap/Td vaccine (1 - Tdap) Never done    Shingles vaccine (1 of 2) Never done    Respiratory Syncytial Virus (RSV) Pregnant or age 60 yrs+ (1 - 1-dose 60+ series) Never done    Pneumococcal 65+ years Vaccine (2 of 2 - PPSV23 or PCV20) 11/21/2018    COVID-19 Vaccine (3 - Pfizer risk series) 04/19/2021    Prostate Specific Antigen (PSA) Screening or Monitoring  08/22/2023    Annual Wellness Visit (Medicare)  11/27/2023    Colorectal Cancer Screen  07/03/2024    Flu vaccine (1) 08/01/2024    A1C test (Diabetic or Prediabetic)  08/30/2024    Lipids  08/30/2024    GFR test (Diabetes, CKD 3-4, OR last GFR 15-59)  08/30/2024         \"Have you been to the ER, urgent care clinic since your last visit?  Hospitalized since your last visit?\"    NO    “Have you seen or consulted any other health care providers outside of [facility] since your last visit?”    NO    “Have you had a colorectal cancer screening such as a colonoscopy/FIT/Cologuard?    NO    Date of last Colonoscopy: 7/3/2019  No cologuard on file  No FIT/FOBT on file   No flexible sigmoidoscopy on file

## 2024-09-03 ENCOUNTER — LAB (OUTPATIENT)
Facility: CLINIC | Age: 69
End: 2024-09-03

## 2024-09-03 DIAGNOSIS — N18.30 STAGE 3 CHRONIC KIDNEY DISEASE, UNSPECIFIED WHETHER STAGE 3A OR 3B CKD (HCC): ICD-10-CM

## 2024-09-03 DIAGNOSIS — E78.00 PURE HYPERCHOLESTEROLEMIA, UNSPECIFIED: ICD-10-CM

## 2024-09-03 DIAGNOSIS — I10 PRIMARY HYPERTENSION: ICD-10-CM

## 2024-09-03 LAB
ALBUMIN SERPL-MCNC: 3.3 G/DL (ref 3.5–5)
ALBUMIN/GLOB SERPL: 0.8 (ref 1.1–2.2)
ALP SERPL-CCNC: 140 U/L (ref 45–117)
ALT SERPL-CCNC: 22 U/L (ref 12–78)
ANION GAP SERPL CALC-SCNC: 4 MMOL/L (ref 5–15)
AST SERPL-CCNC: 24 U/L (ref 15–37)
BILIRUB SERPL-MCNC: 0.6 MG/DL (ref 0.2–1)
BUN SERPL-MCNC: 19 MG/DL (ref 6–20)
BUN/CREAT SERPL: 15 (ref 12–20)
CALCIUM SERPL-MCNC: 9.3 MG/DL (ref 8.5–10.1)
CHLORIDE SERPL-SCNC: 109 MMOL/L (ref 97–108)
CHOLEST SERPL-MCNC: 143 MG/DL
CO2 SERPL-SCNC: 28 MMOL/L (ref 21–32)
COMMENT:: NORMAL
CREAT SERPL-MCNC: 1.24 MG/DL (ref 0.7–1.3)
CREAT UR-MCNC: 131 MG/DL
GLOBULIN SER CALC-MCNC: 4.1 G/DL (ref 2–4)
GLUCOSE SERPL-MCNC: 99 MG/DL (ref 65–100)
HDLC SERPL-MCNC: 40 MG/DL
HDLC SERPL: 3.6 (ref 0–5)
LDLC SERPL CALC-MCNC: 90.2 MG/DL (ref 0–100)
MICROALBUMIN UR-MCNC: 0.59 MG/DL
MICROALBUMIN/CREAT UR-RTO: 5 MG/G (ref 0–30)
POTASSIUM SERPL-SCNC: 4.1 MMOL/L (ref 3.5–5.1)
PROT SERPL-MCNC: 7.4 G/DL (ref 6.4–8.2)
SODIUM SERPL-SCNC: 141 MMOL/L (ref 136–145)
SPECIMEN HOLD: NORMAL
TRIGL SERPL-MCNC: 64 MG/DL
VLDLC SERPL CALC-MCNC: 12.8 MG/DL

## 2024-09-03 NOTE — TELEPHONE ENCOUNTER
PCP: Nader Grady APRN - NP    Last appt: 8/28/2024    Future Appointments   Date Time Provider Department Center   1/6/2025  9:00 AM Nader Grady APRN - NP [facility] DEP       Requested Prescriptions     Pending Prescriptions Disp Refills    potassium chloride (KLOR-CON M) 20 MEQ extended release tablet 90 tablet 2     Sig: TAKE 1  BY MOUTH THREE TIMES DAILY

## 2024-09-04 DIAGNOSIS — N18.30 STAGE 3 CHRONIC KIDNEY DISEASE, UNSPECIFIED WHETHER STAGE 3A OR 3B CKD (HCC): Primary | ICD-10-CM

## 2024-09-04 DIAGNOSIS — I10 ESSENTIAL (PRIMARY) HYPERTENSION: ICD-10-CM

## 2024-09-04 RX ORDER — POTASSIUM CHLORIDE 1500 MG/1
TABLET, EXTENDED RELEASE ORAL
Qty: 90 TABLET | Refills: 1 | Status: SHIPPED | OUTPATIENT
Start: 2024-09-04

## 2024-09-04 RX ORDER — AMLODIPINE AND VALSARTAN 10; 160 MG/1; MG/1
1 TABLET ORAL DAILY
Qty: 90 TABLET | Refills: 1 | Status: SHIPPED | OUTPATIENT
Start: 2024-09-04 | End: 2025-03-03

## 2024-09-04 RX ORDER — PROPRANOLOL HYDROCHLORIDE 20 MG/1
20 TABLET ORAL 2 TIMES DAILY
Qty: 180 TABLET | Refills: 1 | Status: SHIPPED | OUTPATIENT
Start: 2024-09-04 | End: 2025-03-03

## 2024-09-04 NOTE — TELEPHONE ENCOUNTER
PCP: Nader Grady APRN - NP    Last appt: 8/28/2024    Future Appointments   Date Time Provider Department Center   1/6/2025  9:00 AM Nader Grady APRN - NP [facility] DEP       Requested Prescriptions     Pending Prescriptions Disp Refills    potassium chloride (KLOR-CON M) 20 MEQ extended release tablet 90 tablet 1     Sig: TAKE 1  BY MOUTH THREE TIMES DAILY

## 2024-09-04 NOTE — RESULT ENCOUNTER NOTE
Kidney function looks slightly improved. I'd like to add a medication to your amlodipine to help with kidney function. Since blood pressure at goal, I don't want to drop it too low so I'm going to decrease your propranolol.     Lipid panel is stable. We'll continue atorvastatin.   DIONNE VALERIO, JOLANTA - NP

## 2024-09-11 RX ORDER — POTASSIUM CHLORIDE 1500 MG/1
TABLET, EXTENDED RELEASE ORAL
Qty: 90 TABLET | Refills: 0 | OUTPATIENT
Start: 2024-09-11

## 2024-11-26 ENCOUNTER — TELEPHONE (OUTPATIENT)
Facility: CLINIC | Age: 69
End: 2024-11-26

## 2024-11-26 NOTE — TELEPHONE ENCOUNTER
Pt contacted. He states he has already reached out to oncology. He realized he had called the wrong office after he hung up.

## 2024-12-20 ENCOUNTER — TELEPHONE (OUTPATIENT)
Facility: CLINIC | Age: 69
End: 2024-12-20

## 2024-12-20 NOTE — TELEPHONE ENCOUNTER
----- Message from [employer] sent at 12/18/2024  9:06 AM EST -----  Regarding: [facility] Message to Provider  [facility] Message to Provider    Relationship to Patient: Self     Additional Information Patient is currently taking a treatment because of an existing health issue , and he is requesting if JOLANTA Grady if she could look into his chart . He is also requesting for an second opinion  --------------------------------------------------------------------------------------------------------------------------    Call Back Information: OK to leave message on voicemail  Preferred Call Back Number: Phone [phone]

## 2025-01-10 NOTE — PROGRESS NOTES
[facility] [place]  Office Note  Please note that this dictation was completed with [employer], the computer voice recognition software.  Quite often unanticipated grammatical, syntax, homophones, and other interpretive errors are inadvertently transcribed by the computer software.  Please disregard these errors.  Please excuse any errors that have escaped final proofreading.       History of present illness:Augusto Rios is a 69 y.o. male presenting for No chief complaint on file.      Established patient, MAW August 2024.  Past medical history Mantle cell lymphoma, prostate cancer, hypertension, hypercholesterolemia, chronic kidney disease.  He does report an increase in some acid reflux, he is attempting to avoid triggers.    Prostate cancer/ED  Urology    Mantle cell lymphoma  [facility]  Currently under surveillance    Hypertension  Amlodipine-valsartan, propranolol    Hypercholesterolemia  Atorvastatin 10 mg  LDL 90 September 2024    Review of Systems   Constitutional: Negative.    Respiratory:  Negative for shortness of breath.    Cardiovascular:  Negative for chest pain.         Past Medical History:   Diagnosis Date    Chronic rhinitis 08/21/2017    ED (erectile dysfunction) of organic origin 08/21/2017    GERD (gastroesophageal reflux disease)     History of cellulitis 08/21/2017    of neck    History of colon polyps 08/21/2017    History of prostate cancer     prostate - surgery/lymphoma    Hyperlipidemia 08/21/2017    Hypertension     Hypokalemia 08/21/2017    Mantle cell lymphoma (HCC) 08/21/2017    Narcolepsy 08/21/2017    Prediabetes        Allergies   Allergen Reactions    Lisinopril Cough        Prior to Admission medications    Medication Sig Start Date End Date Taking? Authorizing Provider   potassium chloride (KLOR-CON M) 20 MEQ extended release tablet TAKE 1  BY MOUTH THREE TIMES DAILY 9/4/24   Nader Grady APRN - NP

## 2025-01-13 ENCOUNTER — OFFICE VISIT (OUTPATIENT)
Facility: CLINIC | Age: 70
End: 2025-01-13
Payer: MEDICARE

## 2025-01-13 VITALS
TEMPERATURE: 97.9 F | RESPIRATION RATE: 16 BRPM | WEIGHT: 175.4 LBS | SYSTOLIC BLOOD PRESSURE: 126 MMHG | BODY MASS INDEX: 27.53 KG/M2 | DIASTOLIC BLOOD PRESSURE: 80 MMHG | HEART RATE: 78 BPM | HEIGHT: 67 IN | OXYGEN SATURATION: 92 %

## 2025-01-13 DIAGNOSIS — R73.03 PREDIABETES: ICD-10-CM

## 2025-01-13 DIAGNOSIS — L29.9 ITCHING: ICD-10-CM

## 2025-01-13 DIAGNOSIS — E78.00 PURE HYPERCHOLESTEROLEMIA, UNSPECIFIED: ICD-10-CM

## 2025-01-13 DIAGNOSIS — C83.10 MANTLE CELL LYMPHOMA, UNSPECIFIED BODY REGION (HCC): ICD-10-CM

## 2025-01-13 DIAGNOSIS — K21.9 GASTROESOPHAGEAL REFLUX DISEASE WITHOUT ESOPHAGITIS: ICD-10-CM

## 2025-01-13 DIAGNOSIS — I10 PRIMARY HYPERTENSION: Primary | ICD-10-CM

## 2025-01-13 DIAGNOSIS — N18.30 STAGE 3 CHRONIC KIDNEY DISEASE, UNSPECIFIED WHETHER STAGE 3A OR 3B CKD (HCC): ICD-10-CM

## 2025-01-13 PROCEDURE — 1123F ACP DISCUSS/DSCN MKR DOCD: CPT | Performed by: NURSE PRACTITIONER

## 2025-01-13 PROCEDURE — 1159F MED LIST DOCD IN RCRD: CPT | Performed by: NURSE PRACTITIONER

## 2025-01-13 PROCEDURE — 1036F TOBACCO NON-USER: CPT | Performed by: NURSE PRACTITIONER

## 2025-01-13 PROCEDURE — G8419 CALC BMI OUT NRM PARAM NOF/U: HCPCS | Performed by: NURSE PRACTITIONER

## 2025-01-13 PROCEDURE — G8427 DOCREV CUR MEDS BY ELIG CLIN: HCPCS | Performed by: NURSE PRACTITIONER

## 2025-01-13 PROCEDURE — 3074F SYST BP LT 130 MM HG: CPT | Performed by: NURSE PRACTITIONER

## 2025-01-13 PROCEDURE — 3079F DIAST BP 80-89 MM HG: CPT | Performed by: NURSE PRACTITIONER

## 2025-01-13 PROCEDURE — 1160F RVW MEDS BY RX/DR IN RCRD: CPT | Performed by: NURSE PRACTITIONER

## 2025-01-13 PROCEDURE — 1126F AMNT PAIN NOTED NONE PRSNT: CPT | Performed by: NURSE PRACTITIONER

## 2025-01-13 PROCEDURE — 99214 OFFICE O/P EST MOD 30 MIN: CPT | Performed by: NURSE PRACTITIONER

## 2025-01-13 PROCEDURE — 3017F COLORECTAL CA SCREEN DOC REV: CPT | Performed by: NURSE PRACTITIONER

## 2025-01-13 RX ORDER — HYDROXYZINE HYDROCHLORIDE 25 MG/1
25 TABLET, FILM COATED ORAL EVERY 8 HOURS PRN
COMMUNITY
Start: 2024-11-26 | End: 2025-01-13 | Stop reason: SDUPTHER

## 2025-01-13 RX ORDER — HYDROXYZINE HYDROCHLORIDE 25 MG/1
25 TABLET, FILM COATED ORAL EVERY 8 HOURS PRN
Qty: 60 TABLET | Refills: 0 | Status: SHIPPED | OUTPATIENT
Start: 2025-01-13 | End: 2025-02-12

## 2025-01-13 SDOH — ECONOMIC STABILITY: FOOD INSECURITY: WITHIN THE PAST 12 MONTHS, YOU WORRIED THAT YOUR FOOD WOULD RUN OUT BEFORE YOU GOT MONEY TO BUY MORE.: NEVER TRUE

## 2025-01-13 SDOH — ECONOMIC STABILITY: FOOD INSECURITY: WITHIN THE PAST 12 MONTHS, THE FOOD YOU BOUGHT JUST DIDN'T LAST AND YOU DIDN'T HAVE MONEY TO GET MORE.: NEVER TRUE

## 2025-01-13 ASSESSMENT — PATIENT HEALTH QUESTIONNAIRE - PHQ9
SUM OF ALL RESPONSES TO PHQ QUESTIONS 1-9: 0
SUM OF ALL RESPONSES TO PHQ QUESTIONS 1-9: 0
SUM OF ALL RESPONSES TO PHQ9 QUESTIONS 1 & 2: 0
2. FEELING DOWN, DEPRESSED OR HOPELESS: NOT AT ALL
1. LITTLE INTEREST OR PLEASURE IN DOING THINGS: NOT AT ALL
SUM OF ALL RESPONSES TO PHQ QUESTIONS 1-9: 0
SUM OF ALL RESPONSES TO PHQ QUESTIONS 1-9: 0

## 2025-01-13 ASSESSMENT — ENCOUNTER SYMPTOMS: SHORTNESS OF BREATH: 0

## 2025-01-13 NOTE — PROGRESS NOTES
Augusto Rios is a 69 y.o. male     Chief Complaint   Patient presents with    Hyperlipidemia       /80 (Site: Left Upper Arm, Position: Sitting, Cuff Size: Medium Adult)   Pulse 78   Temp 97.9 °F (36.6 °C) (Oral)   Resp 16   Ht 1.702 m (5' 7\")   Wt 79.6 kg (175 lb 6.4 oz)   SpO2 92%   BMI 27.47 kg/m²     Health Maintenance Due   Topic Date Due    DTaP/Tdap/Td vaccine (1 - Tdap) Never done    Shingles vaccine (1 of 2) Never done    Respiratory Syncytial Virus (RSV) Pregnant or age 60 yrs+ (1 - Risk 60-74 years 1-dose series) Never done    COVID-19 Vaccine (3 - Pfizer risk series) 04/19/2021    Prostate Specific Antigen (PSA) Screening or Monitoring  08/22/2023    Colorectal Cancer Screen  07/03/2024    Flu vaccine (1) 08/01/2024    A1C test (Diabetic or Prediabetic)  08/30/2024         \"Have you been to the ER, urgent care clinic since your last visit?  Hospitalized since your last visit?\"    NO    “Have you seen or consulted any other health care providers outside of [facility] since your last visit?”    NO    “Have you had a colorectal cancer screening such as a colonoscopy/FIT/Cologuard?    NO    Date of last Colonoscopy: 7/3/2019  No cologuard on file  No FIT/FOBT on file   No flexible sigmoidoscopy on file

## 2025-04-16 ENCOUNTER — OFFICE VISIT (OUTPATIENT)
Facility: CLINIC | Age: 70
End: 2025-04-16
Payer: COMMERCIAL

## 2025-04-16 VITALS
SYSTOLIC BLOOD PRESSURE: 142 MMHG | DIASTOLIC BLOOD PRESSURE: 90 MMHG | BODY MASS INDEX: 26.51 KG/M2 | HEIGHT: 67 IN | WEIGHT: 168.9 LBS | RESPIRATION RATE: 16 BRPM | HEART RATE: 68 BPM | OXYGEN SATURATION: 98 % | TEMPERATURE: 98.4 F

## 2025-04-16 DIAGNOSIS — N18.30 STAGE 3 CHRONIC KIDNEY DISEASE, UNSPECIFIED WHETHER STAGE 3A OR 3B CKD (HCC): ICD-10-CM

## 2025-04-16 DIAGNOSIS — L23.9 ALLERGIC DERMATITIS: ICD-10-CM

## 2025-04-16 DIAGNOSIS — I10 PRIMARY HYPERTENSION: Primary | ICD-10-CM

## 2025-04-16 DIAGNOSIS — C83.14 MANTLE CELL LYMPHOMA OF LYMPH NODES OF AXILLA: ICD-10-CM

## 2025-04-16 DIAGNOSIS — E78.00 PURE HYPERCHOLESTEROLEMIA, UNSPECIFIED: ICD-10-CM

## 2025-04-16 PROCEDURE — 1126F AMNT PAIN NOTED NONE PRSNT: CPT | Performed by: NURSE PRACTITIONER

## 2025-04-16 PROCEDURE — 1159F MED LIST DOCD IN RCRD: CPT | Performed by: NURSE PRACTITIONER

## 2025-04-16 PROCEDURE — G8419 CALC BMI OUT NRM PARAM NOF/U: HCPCS | Performed by: NURSE PRACTITIONER

## 2025-04-16 PROCEDURE — 3017F COLORECTAL CA SCREEN DOC REV: CPT | Performed by: NURSE PRACTITIONER

## 2025-04-16 PROCEDURE — 1160F RVW MEDS BY RX/DR IN RCRD: CPT | Performed by: NURSE PRACTITIONER

## 2025-04-16 PROCEDURE — 1036F TOBACCO NON-USER: CPT | Performed by: NURSE PRACTITIONER

## 2025-04-16 PROCEDURE — 99214 OFFICE O/P EST MOD 30 MIN: CPT | Performed by: NURSE PRACTITIONER

## 2025-04-16 PROCEDURE — 3077F SYST BP >= 140 MM HG: CPT | Performed by: NURSE PRACTITIONER

## 2025-04-16 PROCEDURE — 3080F DIAST BP >= 90 MM HG: CPT | Performed by: NURSE PRACTITIONER

## 2025-04-16 PROCEDURE — 1123F ACP DISCUSS/DSCN MKR DOCD: CPT | Performed by: NURSE PRACTITIONER

## 2025-04-16 PROCEDURE — G8427 DOCREV CUR MEDS BY ELIG CLIN: HCPCS | Performed by: NURSE PRACTITIONER

## 2025-04-16 RX ORDER — CETIRIZINE HYDROCHLORIDE 10 MG/1
10 TABLET ORAL DAILY
Qty: 90 TABLET | Refills: 0 | Status: SHIPPED | OUTPATIENT
Start: 2025-04-16 | End: 2025-07-15

## 2025-04-16 RX ORDER — ATORVASTATIN CALCIUM 10 MG/1
10 TABLET, FILM COATED ORAL DAILY
Qty: 90 TABLET | Refills: 1 | Status: SHIPPED | OUTPATIENT
Start: 2025-04-16 | End: 2025-10-13

## 2025-04-16 RX ORDER — HYDROXYZINE HYDROCHLORIDE 25 MG/1
25 TABLET, FILM COATED ORAL 3 TIMES DAILY PRN
COMMUNITY

## 2025-04-16 RX ORDER — PROPRANOLOL HCL 20 MG
20 TABLET ORAL 2 TIMES DAILY
Qty: 180 TABLET | Refills: 1 | Status: SHIPPED | OUTPATIENT
Start: 2025-04-16 | End: 2025-10-13

## 2025-04-16 RX ORDER — AMLODIPINE AND VALSARTAN 10; 160 MG/1; MG/1
1 TABLET ORAL DAILY
Qty: 90 TABLET | Refills: 1 | Status: SHIPPED | OUTPATIENT
Start: 2025-04-16 | End: 2025-10-13

## 2025-04-16 ASSESSMENT — ENCOUNTER SYMPTOMS: SHORTNESS OF BREATH: 0

## 2025-04-16 NOTE — PROGRESS NOTES
Augusto Rios is a 70 y.o. male     Chief Complaint   Patient presents with    Hypertension       BP (!) 142/90 (BP Site: Left Upper Arm, Patient Position: Sitting, BP Cuff Size: Medium Adult)   Pulse 68   Temp 98.4 °F (36.9 °C) (Oral)   Resp 16   Ht 1.702 m (5' 7\")   Wt 76.6 kg (168 lb 14.4 oz)   SpO2 98%   BMI 26.45 kg/m²     Health Maintenance Due   Topic Date Due    DTaP/Tdap/Td vaccine (1 - Tdap) Never done    Shingles vaccine (1 of 2) Never done    Respiratory Syncytial Virus (RSV) Pregnant or age 60 yrs+ (1 - Risk 60-74 years 1-dose series) Never done    COVID-19 Vaccine (3 - Pfizer risk series) 04/19/2021    Prostate Specific Antigen (PSA) Screening or Monitoring  08/22/2023    Colorectal Cancer Screen  07/03/2024    A1C test (Diabetic or Prediabetic)  08/30/2024         \"Have you been to the ER, urgent care clinic since your last visit?  Hospitalized since your last visit?\"    NO    “Have you seen or consulted any other health care providers outside of [facility] since your last visit?”    NO    “Have you had a colorectal cancer screening such as a colonoscopy/FIT/Cologuard?    NO    Date of last Colonoscopy: 7/3/2019  No cologuard on file  No FIT/FOBT on file   No flexible sigmoidoscopy on file

## 2025-06-18 ENCOUNTER — ANESTHESIA EVENT (OUTPATIENT)
Facility: HOSPITAL | Age: 70
End: 2025-06-18
Payer: MEDICARE

## 2025-06-18 ENCOUNTER — HOSPITAL ENCOUNTER (OUTPATIENT)
Facility: HOSPITAL | Age: 70
Setting detail: OUTPATIENT SURGERY
Discharge: HOME | End: 2025-06-18
Attending: INTERNAL MEDICINE | Admitting: INTERNAL MEDICINE
Payer: MEDICARE

## 2025-06-18 ENCOUNTER — ANESTHESIA (OUTPATIENT)
Facility: HOSPITAL | Age: 70
End: 2025-06-18
Payer: MEDICARE

## 2025-06-18 VITALS
RESPIRATION RATE: 15 BRPM | BODY MASS INDEX: 25.22 KG/M2 | TEMPERATURE: 97.6 F | OXYGEN SATURATION: 98 % | WEIGHT: 161 LBS | DIASTOLIC BLOOD PRESSURE: 71 MMHG | SYSTOLIC BLOOD PRESSURE: 124 MMHG | HEART RATE: 88 BPM

## 2025-06-18 PROCEDURE — 3700000001 HC ADD 15 MINUTES (ANESTHESIA): Performed by: INTERNAL MEDICINE

## 2025-06-18 PROCEDURE — 2580000003 HC RX 258: Performed by: INTERNAL MEDICINE

## 2025-06-18 PROCEDURE — 7100000010 HC PHASE II RECOVERY - FIRST 15 MIN: Performed by: INTERNAL MEDICINE

## 2025-06-18 PROCEDURE — 2709999900 HC NON-CHARGEABLE SUPPLY: Performed by: INTERNAL MEDICINE

## 2025-06-18 PROCEDURE — 3600007512: Performed by: INTERNAL MEDICINE

## 2025-06-18 PROCEDURE — 3600007502: Performed by: INTERNAL MEDICINE

## 2025-06-18 PROCEDURE — 88305 TISSUE EXAM BY PATHOLOGIST: CPT

## 2025-06-18 PROCEDURE — 3700000000 HC ANESTHESIA ATTENDED CARE: Performed by: INTERNAL MEDICINE

## 2025-06-18 PROCEDURE — 6360000002 HC RX W HCPCS: Performed by: NURSE ANESTHETIST, CERTIFIED REGISTERED

## 2025-06-18 PROCEDURE — 7100000011 HC PHASE II RECOVERY - ADDTL 15 MIN: Performed by: INTERNAL MEDICINE

## 2025-06-18 RX ORDER — SODIUM CHLORIDE 9 MG/ML
INJECTION, SOLUTION INTRAVENOUS PRN
Status: DISCONTINUED | OUTPATIENT
Start: 2025-06-18 | End: 2025-06-18 | Stop reason: HOSPADM

## 2025-06-18 RX ORDER — SODIUM CHLORIDE 0.9 % (FLUSH) 0.9 %
5-40 SYRINGE (ML) INJECTION PRN
Status: DISCONTINUED | OUTPATIENT
Start: 2025-06-18 | End: 2025-06-18 | Stop reason: HOSPADM

## 2025-06-18 RX ORDER — PHENYLEPHRINE HCL IN 0.9% NACL 0.4MG/10ML
SYRINGE (ML) INTRAVENOUS
Status: DISCONTINUED | OUTPATIENT
Start: 2025-06-18 | End: 2025-06-18 | Stop reason: SDUPTHER

## 2025-06-18 RX ORDER — SODIUM CHLORIDE 9 MG/ML
INJECTION, SOLUTION INTRAVENOUS CONTINUOUS
Status: DISCONTINUED | OUTPATIENT
Start: 2025-06-18 | End: 2025-06-18 | Stop reason: HOSPADM

## 2025-06-18 RX ORDER — LIDOCAINE HYDROCHLORIDE 20 MG/ML
INJECTION, SOLUTION EPIDURAL; INFILTRATION; INTRACAUDAL; PERINEURAL
Status: DISCONTINUED | OUTPATIENT
Start: 2025-06-18 | End: 2025-06-18 | Stop reason: SDUPTHER

## 2025-06-18 RX ORDER — SODIUM CHLORIDE 0.9 % (FLUSH) 0.9 %
5-40 SYRINGE (ML) INJECTION EVERY 12 HOURS SCHEDULED
Status: DISCONTINUED | OUTPATIENT
Start: 2025-06-18 | End: 2025-06-18 | Stop reason: HOSPADM

## 2025-06-18 RX ADMIN — Medication 120 MCG: at 14:55

## 2025-06-18 RX ADMIN — Medication 80 MCG: at 14:47

## 2025-06-18 RX ADMIN — SODIUM CHLORIDE: 9 INJECTION, SOLUTION INTRAVENOUS at 14:17

## 2025-06-18 RX ADMIN — LIDOCAINE HYDROCHLORIDE 80 MG: 20 INJECTION, SOLUTION EPIDURAL; INFILTRATION; INTRACAUDAL; PERINEURAL at 14:23

## 2025-06-18 RX ADMIN — Medication 120 MCG: at 14:33

## 2025-06-18 RX ADMIN — Medication 120 MCG: at 14:39

## 2025-06-18 RX ADMIN — Medication 80 MCG: at 14:28

## 2025-06-18 RX ADMIN — Medication 80 MCG: at 14:42

## 2025-06-18 RX ADMIN — SODIUM CHLORIDE: 9 INJECTION, SOLUTION INTRAVENOUS at 14:56

## 2025-06-18 ASSESSMENT — PAIN - FUNCTIONAL ASSESSMENT
PAIN_FUNCTIONAL_ASSESSMENT: NONE - DENIES PAIN

## 2025-06-18 NOTE — ANESTHESIA PRE PROCEDURE
Department of Anesthesiology  Preprocedure Note       Name:  Augusto Rios   Age:  70 y.o.  :  1955                                          MRN:  [id number]         Date:  2025      Surgeon: Surgeon(s):  Armand Villa MD    Procedure: Procedure(s):  COLONOSCOPY WITH BIOPSY    Medications prior to admission:   Prior to Admission medications    Medication Sig Start Date End Date Taking? Authorizing Provider   hydrOXYzine HCl (ATARAX) 25 MG tablet Take 1 tablet by mouth 3 times daily as needed for Itching   Yes Estee Heck MD   amLODIPine-valsartan (EXFORGE)  MG per tablet Take 1 tablet by mouth daily 4/16/25 10/13/25 Yes Nader Grady APRN - NP   atorvastatin (LIPITOR) 10 MG tablet Take 1 tablet by mouth daily 4/16/25 10/13/25 Yes Nader Grady APRN - NP   propranolol (INDERAL) 20 MG tablet Take 1 tablet by mouth 2 times daily 4/16/25 10/13/25 Yes Nader Grady APRN - NP   cetirizine (ZYRTEC) 10 MG tablet Take 1 tablet by mouth daily 4/16/25 7/15/25 Yes Nader Grady APRN - NP   Acalabrutinib 100 MG CAPS Take by mouth    Estee Heck MD       Current medications:    Current Facility-Administered Medications   Medication Dose Route Frequency Provider Last Rate Last Admin   • 0.9 % sodium chloride infusion   IntraVENous Continuous Armand Villa MD       • sodium chloride flush 0.9 % injection 5-40 mL  5-40 mL IntraVENous 2 times per day Armand Villa MD       • sodium chloride flush 0.9 % injection 5-40 mL  5-40 mL IntraVENous PRN Armand Villa MD       • 0.9 % sodium chloride infusion   IntraVENous PRN Armand Villa MD         Facility-Administered Medications Ordered in Other Encounters   Medication Dose Route Frequency Provider Last Rate Last Admin   • lidocaine PF 2 % injection   IntraVENous Once PRN Coco Chambers, APRN - CRNA   80 mg at 25 1423   • propofol bolus   IntraVENous Once PRN Coco Chambers, APRN - CRNA   30 mg at    Resp: 21   Temp: 97.1 °F (36.2 °C)   TempSrc: Temporal   SpO2: 100%   Weight: 73 kg (161 lb)                                              BP Readings from Last 3 Encounters:   06/18/25 (!) 146/82   04/16/25 (!) 142/90   01/13/25 126/80       NPO Status: Time of last liquid consumption: 1200                        Time of last solid consumption: 1430                        Date of last liquid consumption: 06/18/25                        Date of last solid food consumption: 06/17/25    BMI:   Wt Readings from Last 3 Encounters:   06/18/25 73 kg (161 lb)   04/16/25 76.6 kg (168 lb 14.4 oz)   01/13/25 79.6 kg (175 lb 6.4 oz)     Body mass index is 25.22 kg/m².    CBC:   Lab Results   Component Value Date/Time    WBC 7.6 08/30/2023 09:04 AM    RBC 4.62 08/30/2023 09:04 AM    HGB 12.7 08/30/2023 09:04 AM    HCT 41.3 08/30/2023 09:04 AM    MCV 89.4 08/30/2023 09:04 AM    RDW 13.0 08/30/2023 09:04 AM     08/30/2023 09:04 AM       CMP:   Lab Results   Component Value Date/Time     09/03/2024 09:16 AM    K 4.1 09/03/2024 09:16 AM     09/03/2024 09:16 AM    CO2 28 09/03/2024 09:16 AM    BUN 19 09/03/2024 09:16 AM    CREATININE 1.24 09/03/2024 09:16 AM    GFRAA >60 08/22/2022 09:12 AM    AGRATIO 0.9 02/28/2023 09:04 AM    LABGLOM 62 09/03/2024 09:16 AM    LABGLOM 57 08/30/2023 09:04 AM    LABGLOM 56 02/28/2023 09:04 AM    GLUCOSE 99 09/03/2024 09:16 AM    CALCIUM 9.3 09/03/2024 09:16 AM    BILITOT 0.6 09/03/2024 09:16 AM    ALKPHOS 140 09/03/2024 09:16 AM    ALKPHOS 115 02/28/2023 09:04 AM    AST 24 09/03/2024 09:16 AM    ALT 22 09/03/2024 09:16 AM       POC Tests: No results for input(s): \"POCGLU\", \"POCNA\", \"POCK\", \"POCCL\", \"POCBUN\", \"POCHEMO\", \"POCHCT\" in the last 72 hours.    Coags: No results found for: \"PROTIME\", \"INR\", \"APTT\"    HCG (If Applicable): No results found for: \"PREGTESTUR\", \"PREGSERUM\", \"HCG\", \"HCGQUANT\"     ABGs: No results found for: \"PHART\", \"PO2ART\", \"DGD2AFK\", \"RET6EIC\", \"BEART\",

## 2025-06-18 NOTE — PROGRESS NOTES
Initial RN admission and assessment performed and documented in Endoscopy navigator.     Patient evaluated by anesthesia in pre-procedure holding.     All procedural vital signs, airway assessment, and level of consciousness information monitored and recorded by anesthesia staff on the anesthesia record.     Report received from CRNA post procedure.  Patient transported to recovery area by RN.    Endoscopy post procedure time out was performed and specimens were verified with physician.    Endoscope was pre-cleaned at bedside immediately following procedure by Porfirio.

## 2025-06-18 NOTE — OP NOTE
[facility] [address] [address] [phone]               Colonoscopy Operative Report      Indications: Personal h/o colon polyps, last colonoscopy 2019    :  Armand Villa MD    Staff: Circulator: Irma Trevino RN  Endoscopy Technician: Porfirio Morales     Referring Provider: Nader Grady APRN - NP    Sedation:  MAC anesthesia    Procedure Details:  After informed consent was obtained with all risks and benefits of procedure explained and preoperative exam completed, the patient was taken to the endoscopy suite and placed in the left lateral decubitus position.  Upon sequential sedation as per above, a digital rectal exam was performed  And was normal.  The Olympus videocolonoscope  was inserted in the rectum and carefully advanced to the cecum, which was identified by the ileocecal valve and appendiceal orifice.  The quality of preparation was good.  The colonoscope was slowly withdrawn with careful evaluation between folds. Retroflexion in the rectum was performed and was normal..     Findings:   Rectum: Small internal hemorrhoids  Sigmoid: normal  Descending Colon: normal  Transverse Colon: 5  Sessile polyp(s), the largest 10 mm in size; removed with cold snare and hot snare polypectomy  Ascending Colon: normal  Cecum: normal  Terminal Ileum: not intubated    Interventions:  5 complete polypectomy were performed using cold snare  and hot snare and the polyps were  retrieved    Specimen Removed:   ID Type Source Tests Collected by Time Destination   1 : Polyps transverse colon (5) Tissue Colon-Transverse SURGICAL PATHOLOGY Armand Villa MD 6/18/2025 1434        EBL:  Minimal    Complications:  None; patient tolerated the procedure well.    Impression:  -Total 5 polyps, found in the transverse colon - removed and sent for pathology  -Small internal hemorrhoids.     Recommendations:   -Resume normal medication(s).  -A high fiber diet with plenty

## 2025-06-18 NOTE — DISCHARGE INSTRUCTIONS
as it should. You can use the list below to help you make choices about which foods to eat.  Here are some examples of foods that are good sources of fiber.  Fruits  Apple  Apricot  Avocado  Banana  Blackberries  Cherries  Melon  Pear  Raspberries  Grains  Amaranth  Barley  Bran cereal  Farro  Oat bran  Oatmeal  Quinoa  Rice (brown or wild)  Whole-grain bread  Whole-grain English muffin  Protein foods  Almonds  Beans (black, kidney, navy, haywood)  Inder seeds  Garbanzo beans  Lentils  Pumpkin seeds  Split peas  Sunflower seeds  Vegetables  Artichoke  Broccoli  [place] sprouts  Cabbage  Carrots  Cauliflower  Eggplant  Green peas  Kale  Pumpkin  Sweet potato  White potato  Work with your doctor to find out how much of this nutrient you need. Depending on your health, you may need more or less of it in your diet.  Where can you learn more?  Go to https://www.Hum.net/patientEd and enter F355 to learn more about \"Learning About Foods That Are Good Sources of Fiber.\"  Current as of: May 9, 2022               Content Version: 13.6  © 4249-5951 [employer].   Care instructions adapted under license by [employer]. If you have questions about a medical condition or this instruction, always ask your healthcare professional. [employer] disclaims any warranty or liability for your use of this information.         Hemorrhoids: Care Instructions  Overview     Hemorrhoids are swollen veins that develop in the anal canal. Bleeding during bowel movements, itching, and rectal pain are the most common symptoms. Hemorrhoids can be uncomfortable at times, but rarely are they a serious problem.  Most of the time, you can treat them with simple changes to your diet and bowel habits. These changes include eating more fiber and not straining to pass stools. Most hemorrhoids don't need surgery or other treatment unless they are very large and painful or bleed a lot.  Follow-up care is a key part of your  or this instruction, always ask your healthcare professional. [employer] disclaims any warranty or liability for your use of this information.

## 2025-06-18 NOTE — ANESTHESIA PRE PROCEDURE
Department of Anesthesiology  Preprocedure Note       Name:  Augusto Rios   Age:  70 y.o.  :  1955                                          MRN:  [id number]         Date:  2025      Surgeon: Surgeon(s):  Armand Villa MD    Procedure: Procedure(s):  COLONOSCOPY WITH BIOPSY    Medications prior to admission:   Prior to Admission medications    Medication Sig Start Date End Date Taking? Authorizing Provider   hydrOXYzine HCl (ATARAX) 25 MG tablet Take 1 tablet by mouth 3 times daily as needed for Itching   Yes Estee Heck MD   amLODIPine-valsartan (EXFORGE)  MG per tablet Take 1 tablet by mouth daily 4/16/25 10/13/25 Yes Nader Grady APRN - NP   atorvastatin (LIPITOR) 10 MG tablet Take 1 tablet by mouth daily 4/16/25 10/13/25 Yes Nader Grady APRN - NP   propranolol (INDERAL) 20 MG tablet Take 1 tablet by mouth 2 times daily 4/16/25 10/13/25 Yes Nader Gardy APRN - NP   cetirizine (ZYRTEC) 10 MG tablet Take 1 tablet by mouth daily 4/16/25 7/15/25 Yes Nader Grady APRN - NP   Acalabrutinib 100 MG CAPS Take by mouth    Estee Heck MD       Current medications:    Current Facility-Administered Medications   Medication Dose Route Frequency Provider Last Rate Last Admin   • 0.9 % sodium chloride infusion   IntraVENous Continuous Armand Villa MD       • sodium chloride flush 0.9 % injection 5-40 mL  5-40 mL IntraVENous 2 times per day Armand Villa MD       • sodium chloride flush 0.9 % injection 5-40 mL  5-40 mL IntraVENous PRN Armand Villa MD       • 0.9 % sodium chloride infusion   IntraVENous PRN Armand Villa MD         Facility-Administered Medications Ordered in Other Encounters   Medication Dose Route Frequency Provider Last Rate Last Admin   • lidocaine PF 2 % injection   IntraVENous Once PRN Coco Chambers, APRN - CRNA   80 mg at 25 1423   • propofol bolus   IntraVENous Once PRN Coco Chambers, APRN - CRNA   30 mg at

## 2025-06-18 NOTE — H&P
[facility] [address] [address] [phone]        History and Physical     NAME:  Augusto Rios   :  1955   MRN:  [id number]         HPI:  Augusto Rios is a 70 y.o. male here for colonoscopy. Patient has history of colon polyps. Last colonoscopy 6 years back. No family history of colon cancer. No GI symptoms.     Past Surgical History:   Procedure Laterality Date    COLONOSCOPY N/A 7/3/2019    COLONOSCOPY performed by Lg Devine MD at [facility] ENDOSCOPY    OTHER SURGICAL HISTORY      surgery for lymphoma - abdomen    PROSTATECTOMY       Past Medical History:   Diagnosis Date    Chronic rhinitis 2017    ED (erectile dysfunction) of organic origin 2017    GERD (gastroesophageal reflux disease)     History of cellulitis 2017    of neck    History of colon polyps 2017    History of prostate cancer     prostate - surgery/lymphoma    Hyperlipidemia 2017    Hypertension     Hypokalemia 2017    Mantle cell lymphoma (HCC) 2017    Narcolepsy 2017    Prediabetes      Social History     Tobacco Use    Smoking status: Never    Smokeless tobacco: Never   Vaping Use    Vaping status: Never Used   Substance Use Topics    Alcohol use: No    Drug use: No     No current facility-administered medications on file prior to encounter.     Current Outpatient Medications on File Prior to Encounter   Medication Sig Dispense Refill    hydrOXYzine HCl (ATARAX) 25 MG tablet Take 1 tablet by mouth 3 times daily as needed for Itching      amLODIPine-valsartan (EXFORGE)  MG per tablet Take 1 tablet by mouth daily 90 tablet 1    atorvastatin (LIPITOR) 10 MG tablet Take 1 tablet by mouth daily 90 tablet 1    propranolol (INDERAL) 20 MG tablet Take 1 tablet by mouth 2 times daily 180 tablet 1    cetirizine (ZYRTEC) 10 MG tablet Take 1 tablet by mouth daily 90 tablet 0    Acalabrutinib 100 MG CAPS Take by mouth       Allergies

## 2025-07-12 DIAGNOSIS — L23.9 ALLERGIC DERMATITIS: ICD-10-CM

## 2025-07-14 RX ORDER — CETIRIZINE HYDROCHLORIDE 10 MG/1
10 TABLET ORAL DAILY
Qty: 90 TABLET | Refills: 0 | Status: SHIPPED | OUTPATIENT
Start: 2025-07-14

## 2025-07-14 NOTE — ANESTHESIA POSTPROCEDURE EVALUATION
Department of Anesthesiology  Postprocedure Note    Patient: Augusto Rios  MRN: [id number]  YOB: 1955  Date of evaluation: 7/14/2025    Procedure Summary       Date: 06/18/25 Room / Location: [facility] ENDO 03 / [facility] ENDOSCOPY    Anesthesia Start: 1417 Anesthesia Stop: 1457    Procedure: COLONOSCOPY WITH BIOPSY Diagnosis:       Personal history of colon polyps, unspecified      (Personal history of colon polyps, unspecified [Z86.0100])    Surgeons: Armand Villa MD Responsible Provider: Shayan Reid MD    Anesthesia Type: MAC ASA Status: 2            Anesthesia Type: No value filed.    Hubert Phase I: Hubert Score: 10    Hubert Phase II: Hubert Score: 9    Anesthesia Post Evaluation    Patient location during evaluation: bedside  Nausea & Vomiting: no nausea  Cardiovascular status: blood pressure returned to baseline  Respiratory status: acceptable  Hydration status: euvolemic    No notable events documented.

## 2025-07-14 NOTE — TELEPHONE ENCOUNTER
PCP: Nadre Grady APRN - NP    Last appt: 4/16/2025    Future Appointments   Date Time Provider Department Center   7/25/2025  9:30 AM Skyler Patel MD [id number]   9/17/2025  8:00 AM Nader Grady APRN - NP [facility]       Requested Prescriptions     Pending Prescriptions Disp Refills    cetirizine (ZYRTEC) 10 MG tablet [Pharmacy Med Name: CETIRIZINE HCL 10 MG TABLET] 90 tablet 0     Sig: TAKE 1 TABLET BY MOUTH EVERY DAY

## 2025-07-25 ENCOUNTER — HOSPITAL ENCOUNTER (OUTPATIENT)
Facility: HOSPITAL | Age: 70
Discharge: HOME | End: 2025-07-28

## 2025-07-25 ENCOUNTER — CLINICAL DOCUMENTATION (OUTPATIENT)
Facility: HOSPITAL | Age: 70
End: 2025-07-25

## 2025-07-25 VITALS
HEART RATE: 63 BPM | BODY MASS INDEX: 25.74 KG/M2 | WEIGHT: 164 LBS | HEIGHT: 67 IN | SYSTOLIC BLOOD PRESSURE: 134 MMHG | DIASTOLIC BLOOD PRESSURE: 80 MMHG

## 2025-07-25 DIAGNOSIS — R97.21 RISING PSA FOLLOWING TREATMENT FOR MALIGNANT NEOPLASM OF PROSTATE: ICD-10-CM

## 2025-07-25 DIAGNOSIS — C61 PROSTATE CANCER (HCC): Primary | ICD-10-CM

## 2025-07-25 ASSESSMENT — PAIN SCALES - GENERAL: PAINLEVEL_OUTOF10: 0

## 2025-07-25 NOTE — PROGRESS NOTES
[facility] [place] [facility]  [facility]    Radiation Oncology Consultation  Encounter Date: 07/25/25    Augusto Rios  [id number]  1955     Diagnosis   1. C61, R97.21: initially [id number], iPSA 6.0, GG5 prostate cancer s/p RP/PLND (2013) pT3cN0 with nonclearing PSA, now with PSA 1.36 and PSMA positive PB recurrence   2. Mantle cell lymphoma on systemic therapy    AJCC Staging has been reviewed.  History of Present Illness   Mr. Rios is a 70 y.o. male seen in consultation at the request of Dr. Collazo to assess the role of radiation for his prostate cancer.    His oncologic history is detailed below:  06/26/2013: He underwent radical prostatectomy for prostate cancer for a [id number], iPSA 6.0, GG5 prostate cancer with pathology showing pT3cN0, GG2 (tertiary 5) disease with negative margins. Lymph nodes were negative for prostate cancer but incidentally found mantle cell lymphoma   07/31/2013: PSA 0.9  10/08/2013: PSA 0.377  01/06/2014: PSA 0.732  04/07/2014: PSA 0.435  12/11/2023: PSA fluctuated between 0.3 to 1.5 over this time period  06/21/2024: PSA 0.6  05/27/2024: He was on observation for mantle cell lymphoma from initial diagnosis to this point, when he started to develop issues prompting initiation of systemic therapy with bendamustine + rituximab  11/14/2024: Completed six cycles of bendamustine/rituximab  05/29/2025: PSA 1.36  06/09/2025: PSMA PET showed focal PyL binding in the inferior right prostatectomy bed with an associated area of soft tissue anterior to the rectum and posterior to the bladder concerning for local primary tumor recurrence. No intense PyL binding in the lymph nodes, bones, or viscera though there is persistent lymphadenopathy in the bilateral cervical neck, supraclavicular, axillary, mediastinal, retroperitoneal, periportal, mesenteric, pelvic, and inguinal regions with associated diffuse, faint PyL binding likely related to the patient's mantle cell

## 2025-07-28 NOTE — PROGRESS NOTES
NCCN Distress Thermometer    Radiation Oncology at [facility]    Date Screening Completed: 7/25/25    Screening Declined:  [] Yes    Number that best describes how much distress you've experienced in the past week, including today?  0 [x] - No distress 1 []      2 []      3 []      4 []       5 []       6 []      7 []      8 []      9 []       10 [] - Extreme distress    PROBLEM LIST  Have you had concerns about any of the items below in the past week, including today?      Physical Concerns Practical Concerns   [] Pain [] Taking care of myself    [] Sleep [] Taking care of others    [] Fatigue [] Safety   [] Tobacco use  [] Work   [] Substance use  [] School   [] Memory or concentration [] Housing/Utilities   [] Sexual health [] Finances   [] Changes in eating  [] Insurance   [] Loss or change of physical abilities  [] Transportation    []    Emotional Concerns [] Having enough food   [] Worry or anxiety [] Access to medicine   [] Sadness or depression [] Treatment decisions   [] Loss of interest or enjoyment     [] Grief or loss  Spiritual or Jew Concerns   [] Fear [] Sense of meaning or purpose   [] Loneliness  [] Changes in rashaad or beliefs   [] Anger [] Death, dying, or afterlife   [] Changes in appearance [x] Conflict between beliefs and cancer treatments    [] Feelings of worthlessness or being a burden [] Relationship with the sacred    [] Ritual or dietary needs    Social Concerns     [] Relationship with spouse or partner     [] Relationship with children    [] Relationship with family members     [] Relationship with friends or coworkers     [] Communication with health care team     [] Ability to have children     [] Prejudice or discrimination        Other Concerns:

## 2025-08-01 ENCOUNTER — HOSPITAL ENCOUNTER (OUTPATIENT)
Facility: HOSPITAL | Age: 70
Discharge: HOME | End: 2025-08-04
Attending: STUDENT IN AN ORGANIZED HEALTH CARE EDUCATION/TRAINING PROGRAM

## 2025-09-05 ENCOUNTER — CLINICAL DOCUMENTATION (OUTPATIENT)
Facility: HOSPITAL | Age: 70
End: 2025-09-05

## (undated) DEVICE — 1200 GUARD II KIT W/5MM TUBE W/O VAC TUBE: Brand: GUARDIAN

## (undated) DEVICE — Device

## (undated) DEVICE — TRAP SUC MUCOUS 70ML -- MEDICHOICE MEDLINE

## (undated) DEVICE — SOLIDIFIER MEDC 1200ML -- CONVERT TO 356117

## (undated) DEVICE — SYR 10ML LUER LOK 1/5ML GRAD --

## (undated) DEVICE — BASIN EMSIS 16OZ GRAPHITE PLAS KID SHP MOLD GRAD FOR ORAL

## (undated) DEVICE — SET ADMIN 16ML TBNG L100IN 2 Y INJ SITE IV PIGGY BK DISP

## (undated) DEVICE — CONTAINER SPEC 20 ML LID NEUT BUFF FORMALIN 10 % POLYPR STS

## (undated) DEVICE — NEEDLE HYPO 18GA L1.5IN PNK S STL HUB POLYPR SHLD REG BVL

## (undated) DEVICE — CATH IV AUTOGRD BC BLU 22GA 25 -- INSYTE

## (undated) DEVICE — TRAP SPEC COLL POLYP POLYSTYR --

## (undated) DEVICE — NEONATAL-ADULT SPO2 SENSOR: Brand: NELLCOR

## (undated) DEVICE — SUPPLEMENT DIGESTIVE H2O SOL GI-EASE

## (undated) DEVICE — SYR 3ML LL TIP 1/10ML GRAD --

## (undated) DEVICE — KENDALL RADIOLUCENT FOAM MONITORING ELECTRODE RECTANGULAR SHAPE: Brand: KENDALL

## (undated) DEVICE — TOWEL 4 PLY TISS 19X30 SUE WHT

## (undated) DEVICE — SNARE ENDOSCP M L240CM W27MM SHTH DIA2.4MM CHN 2.8MM OVL

## (undated) DEVICE — TRAP SURG QUAD PARABOLA SLOT DSGN SFTY SCRN TRAPEASE

## (undated) DEVICE — SNARE ENDO CAPTIVATOR W10MM X L240CM RND INSUL STIFF-UOM BOX OF 10

## (undated) DEVICE — Z DISCONTINUED PER MEDLINE LINE GAS SAMPLING O2/CO2 LNG AD 13 FT NSL W/ TBNG FILTERLINE